# Patient Record
Sex: MALE | Race: OTHER | NOT HISPANIC OR LATINO
[De-identification: names, ages, dates, MRNs, and addresses within clinical notes are randomized per-mention and may not be internally consistent; named-entity substitution may affect disease eponyms.]

---

## 2017-03-03 ENCOUNTER — APPOINTMENT (OUTPATIENT)
Dept: HEART AND VASCULAR | Facility: CLINIC | Age: 72
End: 2017-03-03

## 2017-03-03 VITALS
HEIGHT: 60 IN | BODY MASS INDEX: 25.97 KG/M2 | SYSTOLIC BLOOD PRESSURE: 151 MMHG | HEART RATE: 94 BPM | DIASTOLIC BLOOD PRESSURE: 65 MMHG | WEIGHT: 132.28 LBS | OXYGEN SATURATION: 99 %

## 2017-06-06 ENCOUNTER — RECORD ABSTRACTING (OUTPATIENT)
Age: 72
End: 2017-06-06

## 2017-06-06 DIAGNOSIS — F15.90 OTHER STIMULANT USE, UNSPECIFIED, UNCOMPLICATED: ICD-10-CM

## 2017-06-06 DIAGNOSIS — R29.898 OTHER SYMPTOMS AND SIGNS INVOLVING THE MUSCULOSKELETAL SYSTEM: ICD-10-CM

## 2017-06-06 DIAGNOSIS — R94.31 ABNORMAL ELECTROCARDIOGRAM [ECG] [EKG]: ICD-10-CM

## 2017-06-06 DIAGNOSIS — Z83.3 FAMILY HISTORY OF DIABETES MELLITUS: ICD-10-CM

## 2017-06-06 DIAGNOSIS — R05 COUGH: ICD-10-CM

## 2017-06-06 DIAGNOSIS — Z87.01 PERSONAL HISTORY OF PNEUMONIA (RECURRENT): ICD-10-CM

## 2017-06-06 DIAGNOSIS — K08.9 DISORDER OF TEETH AND SUPPORTING STRUCTURES, UNSPECIFIED: ICD-10-CM

## 2017-06-06 DIAGNOSIS — Z82.3 FAMILY HISTORY OF STROKE: ICD-10-CM

## 2017-06-06 DIAGNOSIS — R07.89 OTHER CHEST PAIN: ICD-10-CM

## 2017-06-15 ENCOUNTER — EMERGENCY (EMERGENCY)
Facility: HOSPITAL | Age: 72
LOS: 1 days | Discharge: HOME | End: 2017-06-15
Attending: EMERGENCY MEDICINE | Admitting: EMERGENCY MEDICINE
Payer: COMMERCIAL

## 2017-06-15 VITALS
WEIGHT: 134.26 LBS | DIASTOLIC BLOOD PRESSURE: 80 MMHG | SYSTOLIC BLOOD PRESSURE: 173 MMHG | HEART RATE: 89 BPM | RESPIRATION RATE: 18 BRPM | TEMPERATURE: 97 F | HEIGHT: 65 IN | OXYGEN SATURATION: 99 %

## 2017-06-15 DIAGNOSIS — M54.2 CERVICALGIA: ICD-10-CM

## 2017-06-15 DIAGNOSIS — E78.5 HYPERLIPIDEMIA, UNSPECIFIED: ICD-10-CM

## 2017-06-15 DIAGNOSIS — E11.9 TYPE 2 DIABETES MELLITUS WITHOUT COMPLICATIONS: ICD-10-CM

## 2017-06-15 DIAGNOSIS — Z79.899 OTHER LONG TERM (CURRENT) DRUG THERAPY: ICD-10-CM

## 2017-06-15 DIAGNOSIS — I25.10 ATHEROSCLEROTIC HEART DISEASE OF NATIVE CORONARY ARTERY WITHOUT ANGINA PECTORIS: ICD-10-CM

## 2017-06-15 DIAGNOSIS — I10 ESSENTIAL (PRIMARY) HYPERTENSION: ICD-10-CM

## 2017-06-15 DIAGNOSIS — Z95.1 PRESENCE OF AORTOCORONARY BYPASS GRAFT: Chronic | ICD-10-CM

## 2017-06-15 DIAGNOSIS — Z79.82 LONG TERM (CURRENT) USE OF ASPIRIN: ICD-10-CM

## 2017-06-15 LAB
ALBUMIN SERPL ELPH-MCNC: 4.3 G/DL — SIGNIFICANT CHANGE UP (ref 3.3–5)
ALP SERPL-CCNC: 64 U/L — SIGNIFICANT CHANGE UP (ref 40–120)
ALT FLD-CCNC: 22 U/L — SIGNIFICANT CHANGE UP (ref 10–45)
ANION GAP SERPL CALC-SCNC: 12 MMOL/L — SIGNIFICANT CHANGE UP (ref 5–17)
APTT BLD: 33.4 SEC — SIGNIFICANT CHANGE UP (ref 27.5–37.4)
AST SERPL-CCNC: 23 U/L — SIGNIFICANT CHANGE UP (ref 10–40)
BASOPHILS NFR BLD AUTO: 0.8 % — SIGNIFICANT CHANGE UP (ref 0–2)
BILIRUB SERPL-MCNC: 0.6 MG/DL — SIGNIFICANT CHANGE UP (ref 0.2–1.2)
BUN SERPL-MCNC: 14 MG/DL — SIGNIFICANT CHANGE UP (ref 7–23)
CALCIUM SERPL-MCNC: 9.8 MG/DL — SIGNIFICANT CHANGE UP (ref 8.4–10.5)
CHLORIDE SERPL-SCNC: 103 MMOL/L — SIGNIFICANT CHANGE UP (ref 96–108)
CK MB CFR SERPL CALC: 3.3 NG/ML — SIGNIFICANT CHANGE UP (ref 0–6.7)
CK SERPL-CCNC: 88 U/L — SIGNIFICANT CHANGE UP (ref 30–200)
CO2 SERPL-SCNC: 24 MMOL/L — SIGNIFICANT CHANGE UP (ref 22–31)
CREAT SERPL-MCNC: 0.7 MG/DL — SIGNIFICANT CHANGE UP (ref 0.5–1.3)
EOSINOPHIL NFR BLD AUTO: 5.1 % — SIGNIFICANT CHANGE UP (ref 0–6)
GLUCOSE SERPL-MCNC: 142 MG/DL — HIGH (ref 70–99)
HCT VFR BLD CALC: 37.6 % — LOW (ref 39–50)
HGB BLD-MCNC: 12.8 G/DL — LOW (ref 13–17)
INR BLD: 1 — SIGNIFICANT CHANGE UP (ref 0.88–1.16)
LYMPHOCYTES # BLD AUTO: 28.9 % — SIGNIFICANT CHANGE UP (ref 13–44)
MCHC RBC-ENTMCNC: 28.1 PG — SIGNIFICANT CHANGE UP (ref 27–34)
MCHC RBC-ENTMCNC: 34 G/DL — SIGNIFICANT CHANGE UP (ref 32–36)
MCV RBC AUTO: 82.6 FL — SIGNIFICANT CHANGE UP (ref 80–100)
MONOCYTES NFR BLD AUTO: 10.3 % — SIGNIFICANT CHANGE UP (ref 2–14)
NEUTROPHILS NFR BLD AUTO: 54.9 % — SIGNIFICANT CHANGE UP (ref 43–77)
PLATELET # BLD AUTO: 255 K/UL — SIGNIFICANT CHANGE UP (ref 150–400)
POTASSIUM SERPL-MCNC: 5.1 MMOL/L — SIGNIFICANT CHANGE UP (ref 3.5–5.3)
POTASSIUM SERPL-SCNC: 5.1 MMOL/L — SIGNIFICANT CHANGE UP (ref 3.5–5.3)
PROT SERPL-MCNC: 7.9 G/DL — SIGNIFICANT CHANGE UP (ref 6–8.3)
PROTHROM AB SERPL-ACNC: 11.1 SEC — SIGNIFICANT CHANGE UP (ref 9.8–12.7)
RBC # BLD: 4.55 M/UL — SIGNIFICANT CHANGE UP (ref 4.2–5.8)
RBC # FLD: 14.7 % — SIGNIFICANT CHANGE UP (ref 10.3–16.9)
SODIUM SERPL-SCNC: 139 MMOL/L — SIGNIFICANT CHANGE UP (ref 135–145)
TROPONIN T SERPL-MCNC: <0.01 NG/ML — SIGNIFICANT CHANGE UP (ref 0–0.01)
WBC # BLD: 9.4 K/UL — SIGNIFICANT CHANGE UP (ref 3.8–10.5)
WBC # FLD AUTO: 9.4 K/UL — SIGNIFICANT CHANGE UP (ref 3.8–10.5)

## 2017-06-15 PROCEDURE — 85025 COMPLETE CBC W/AUTO DIFF WBC: CPT

## 2017-06-15 PROCEDURE — 93005 ELECTROCARDIOGRAM TRACING: CPT

## 2017-06-15 PROCEDURE — 71045 X-RAY EXAM CHEST 1 VIEW: CPT

## 2017-06-15 PROCEDURE — 99285 EMERGENCY DEPT VISIT HI MDM: CPT | Mod: 25

## 2017-06-15 PROCEDURE — 99283 EMERGENCY DEPT VISIT LOW MDM: CPT | Mod: 25

## 2017-06-15 PROCEDURE — 71010: CPT | Mod: 26

## 2017-06-15 PROCEDURE — 85610 PROTHROMBIN TIME: CPT

## 2017-06-15 PROCEDURE — 85730 THROMBOPLASTIN TIME PARTIAL: CPT

## 2017-06-15 PROCEDURE — 84484 ASSAY OF TROPONIN QUANT: CPT

## 2017-06-15 PROCEDURE — 93010 ELECTROCARDIOGRAM REPORT: CPT

## 2017-06-15 PROCEDURE — 82553 CREATINE MB FRACTION: CPT

## 2017-06-15 PROCEDURE — 82550 ASSAY OF CK (CPK): CPT

## 2017-06-15 PROCEDURE — 80053 COMPREHEN METABOLIC PANEL: CPT

## 2017-06-15 RX ORDER — CYCLOBENZAPRINE HYDROCHLORIDE 10 MG/1
10 TABLET, FILM COATED ORAL ONCE
Qty: 0 | Refills: 0 | Status: COMPLETED | OUTPATIENT
Start: 2017-06-15 | End: 2017-06-15

## 2017-06-15 RX ORDER — CYCLOBENZAPRINE HYDROCHLORIDE 10 MG/1
1 TABLET, FILM COATED ORAL
Qty: 15 | Refills: 0 | OUTPATIENT
Start: 2017-06-15

## 2017-06-15 RX ADMIN — CYCLOBENZAPRINE HYDROCHLORIDE 10 MILLIGRAM(S): 10 TABLET, FILM COATED ORAL at 15:16

## 2017-06-15 NOTE — ED ADULT NURSE NOTE - PSH
History of coronary artery bypass graft x 2    Other postprocedural status  S/P ear surgery  Status post cataract extraction  bilateral

## 2017-06-15 NOTE — ED ADULT NURSE NOTE - CHPI ED SYMPTOMS NEG
no abrasion/no tingling/no difficulty bearing weight/no numbness/no deformity/no fever/no bruising/no stiffness/no weakness/no back pain

## 2017-06-15 NOTE — ED ADULT NURSE NOTE - PMH
CAD (coronary artery disease)    Essential hypertension  HTN (hypertension)  H pylori ulcer    Type 2 diabetes mellitus  Diabetes

## 2017-06-15 NOTE — ED ADULT TRIAGE NOTE - CHIEF COMPLAINT QUOTE
Patient c/o neck pain since yesterday , denies injury , headache nor chest pain . Was sent by PMD for evaluation of dorsal chest pain  .  History of cardiac stent 2015 .

## 2017-06-15 NOTE — ED ADULT NURSE NOTE - OBJECTIVE STATEMENT
aox3 verbal, well appearing, ambulatory comes to ED c/o left sided neck pain x3 days, non radiating, no other neuro deficits noted.

## 2017-06-15 NOTE — ED PROVIDER NOTE - OBJECTIVE STATEMENT
70 y/o M w/ PMHx HTN, DM, HLD, CAD s/p prior PCI, s/p mid CABG LIMA-LAD 11/2015, Iron Deficiency Anemia w/ Upper GI Bleed 6 months ago and H.Pylori, h/o Hyperkalemia, 72 y/o M w/ PMHx HTN, DM, HLD, CAD, s/p mid CABG LIMA-LAD 11/2015, Iron Deficiency Anemia w/ Upper GI Bleed in the past (due to ulcer) who p/w left sided neck pain x 2 days. Patient works int [employer] and noticed pain 2 nights ago when he returned from worked, achey, worse with movement and palpation. Yesterday pain persisted all day, somewhat relieved with heat and Tylenol. Pt saw his PMD today who sent him to ER to r/o cardiac pathology. Pt denies anterior CP, SOB, leg swelling, dizziness, syncope, or palpitations. no n/t/w in extremities.. Pt has been complaint with his meds.

## 2017-06-15 NOTE — ED PROVIDER NOTE - PHYSICAL EXAMINATION
GEN: Well appearing, well nourished, awake, alert, oriented to person, place, time/situation and in no apparent distress.  ENT: Airway patent, Nasal mucosa clear. Mouth with normal mucosa.  EYES: Clear bilaterally.  RESPIRATORY: Breathing comfortably with normal RR.  CARDIAC: Regular rate and rhythm. No carotid bruit.  ABDOMEN: Soft, nontender, +bowel sounds, no rebound, rigidity, or guarding.  MSK: Range of motion is not limited, no deformities noted. +left paracervical muscle TTP, no midline c/t/l-spine TTP.  NEURO: Alert and oriented, no focal deficits. gait normal. Strength and sensation intact b/l.  SKIN: Skin normal color for race, warm, dry and intact. No evidence of rash.  PSYCH: Alert and oriented to person, place, time/situation. normal mood and affect. no apparent risk to self or others.

## 2017-06-15 NOTE — ED PROVIDER NOTE - MEDICAL DECISION MAKING DETAILS
72M with above PMHx with L-sided neck pain that started at work, denies trauma or injury, no CP, no neuro deficits, CXR and labs including CE unremarkable. hx/exam c/w MSK strain, like from work/poor posture/cervical strain. Pt cannot take NSAIDs due to bleeding risk. WIll give flexeril and advised continued tylenol, heating pad and f/u spine. Return precautions given.

## 2017-06-15 NOTE — ED PROVIDER NOTE - NS ED ROS FT
Constitutional: No fever or chills.   Eyes: No pain, blurry vision, or discharge.  ENMT: No hearing changes, pain, discharge or infections. No neck pain or stiffness.  Cardiac: No chest pain, SOB or edema.    Respiratory: No cough or respiratory distress. No hemoptysis.    GI: No nausea, vomiting, diarrhea or abdominal pain.  : No dysuria, frequency or burning.  MS: see hpi.  Neuro: No headache or weakness. No LOC.  Skin: No skin rash.   Except as documented in the HPI, all other systems are negative.

## 2017-06-28 ENCOUNTER — APPOINTMENT (OUTPATIENT)
Dept: HEART AND VASCULAR | Facility: CLINIC | Age: 72
End: 2017-06-28

## 2017-06-28 VITALS
DIASTOLIC BLOOD PRESSURE: 73 MMHG | HEART RATE: 87 BPM | OXYGEN SATURATION: 100 % | SYSTOLIC BLOOD PRESSURE: 138 MMHG | HEIGHT: 60 IN

## 2017-06-28 RX ORDER — CLOPIDOGREL 75 MG/1
75 TABLET, FILM COATED ORAL DAILY
Refills: 0 | Status: DISCONTINUED | COMMUNITY
End: 2017-06-28

## 2017-07-20 ENCOUNTER — APPOINTMENT (OUTPATIENT)
Dept: HEART AND VASCULAR | Facility: CLINIC | Age: 72
End: 2017-07-20

## 2017-07-20 VITALS
BODY MASS INDEX: 26.19 KG/M2 | WEIGHT: 133.38 LBS | DIASTOLIC BLOOD PRESSURE: 60 MMHG | SYSTOLIC BLOOD PRESSURE: 130 MMHG | TEMPERATURE: 97.8 F | OXYGEN SATURATION: 97 % | HEIGHT: 60 IN | HEART RATE: 97 BPM

## 2017-08-01 ENCOUNTER — APPOINTMENT (OUTPATIENT)
Dept: VASCULAR SURGERY | Facility: CLINIC | Age: 72
End: 2017-08-01
Payer: COMMERCIAL

## 2017-08-01 PROCEDURE — 93923 UPR/LXTR ART STDY 3+ LVLS: CPT

## 2017-08-01 PROCEDURE — 93925 LOWER EXTREMITY STUDY: CPT

## 2017-08-21 ENCOUNTER — RX RENEWAL (OUTPATIENT)
Age: 72
End: 2017-08-21

## 2017-12-15 ENCOUNTER — RX RENEWAL (OUTPATIENT)
Age: 72
End: 2017-12-15

## 2017-12-20 ENCOUNTER — RX RENEWAL (OUTPATIENT)
Age: 72
End: 2017-12-20

## 2018-01-10 ENCOUNTER — APPOINTMENT (OUTPATIENT)
Dept: HEART AND VASCULAR | Facility: CLINIC | Age: 73
End: 2018-01-10
Payer: COMMERCIAL

## 2018-01-10 VITALS
WEIGHT: 134 LBS | OXYGEN SATURATION: 98 % | HEART RATE: 90 BPM | SYSTOLIC BLOOD PRESSURE: 144 MMHG | BODY MASS INDEX: 26.17 KG/M2 | DIASTOLIC BLOOD PRESSURE: 71 MMHG

## 2018-01-10 PROCEDURE — 99213 OFFICE O/P EST LOW 20 MIN: CPT

## 2018-01-10 PROCEDURE — 93000 ELECTROCARDIOGRAM COMPLETE: CPT

## 2018-01-18 ENCOUNTER — APPOINTMENT (OUTPATIENT)
Dept: VASCULAR SURGERY | Facility: CLINIC | Age: 73
End: 2018-01-18
Payer: COMMERCIAL

## 2018-01-18 PROCEDURE — 93931 UPPER EXTREMITY STUDY: CPT

## 2018-01-26 ENCOUNTER — APPOINTMENT (OUTPATIENT)
Dept: HEART AND VASCULAR | Facility: CLINIC | Age: 73
End: 2018-01-26
Payer: COMMERCIAL

## 2018-01-26 VITALS
HEIGHT: 60 IN | OXYGEN SATURATION: 98 % | SYSTOLIC BLOOD PRESSURE: 143 MMHG | BODY MASS INDEX: 25.91 KG/M2 | TEMPERATURE: 97.8 F | DIASTOLIC BLOOD PRESSURE: 74 MMHG | WEIGHT: 131.99 LBS | HEART RATE: 85 BPM

## 2018-01-26 PROCEDURE — 99214 OFFICE O/P EST MOD 30 MIN: CPT

## 2018-01-26 RX ORDER — ACETAMINOPHEN/DIPHENHYDRAMINE 500MG-25MG
81 TABLET ORAL
Qty: 30 | Refills: 0 | Status: DISCONTINUED | COMMUNITY
Start: 2016-12-06 | End: 2018-01-26

## 2018-02-20 ENCOUNTER — APPOINTMENT (OUTPATIENT)
Dept: HEART AND VASCULAR | Facility: CLINIC | Age: 73
End: 2018-02-20

## 2018-03-20 ENCOUNTER — APPOINTMENT (OUTPATIENT)
Dept: HEART AND VASCULAR | Facility: CLINIC | Age: 73
End: 2018-03-20
Payer: COMMERCIAL

## 2018-03-20 VITALS — WEIGHT: 131.99 LBS | BODY MASS INDEX: 25.91 KG/M2 | HEIGHT: 60 IN

## 2018-03-20 PROCEDURE — 93015 CV STRESS TEST SUPVJ I&R: CPT

## 2018-03-20 PROCEDURE — 93306 TTE W/DOPPLER COMPLETE: CPT

## 2018-03-20 PROCEDURE — 78452 HT MUSCLE IMAGE SPECT MULT: CPT

## 2018-03-20 PROCEDURE — A9500: CPT

## 2018-04-27 ENCOUNTER — APPOINTMENT (OUTPATIENT)
Dept: HEART AND VASCULAR | Facility: CLINIC | Age: 73
End: 2018-04-27
Payer: COMMERCIAL

## 2018-04-27 VITALS
BODY MASS INDEX: 25.21 KG/M2 | OXYGEN SATURATION: 98 % | HEIGHT: 60 IN | WEIGHT: 128.42 LBS | HEART RATE: 71 BPM | SYSTOLIC BLOOD PRESSURE: 120 MMHG | DIASTOLIC BLOOD PRESSURE: 82 MMHG | TEMPERATURE: 97.5 F

## 2018-04-27 PROCEDURE — 99214 OFFICE O/P EST MOD 30 MIN: CPT

## 2018-04-27 RX ORDER — MEFLOQUINE HYDROCHLORIDE 250 MG/1
250 TABLET ORAL
Qty: 8 | Refills: 0 | Status: DISCONTINUED | COMMUNITY
Start: 2018-01-26 | End: 2018-04-27

## 2018-04-27 RX ORDER — DIPHENOXYLATE HYDROCHLORIDE AND ATROPINE SULFATE 2.5; .025 MG/1; MG/1
2.5-0.025 TABLET ORAL
Qty: 30 | Refills: 0 | Status: DISCONTINUED | COMMUNITY
Start: 2018-01-26 | End: 2018-04-27

## 2018-04-27 RX ORDER — AZITHROMYCIN 500 MG/1
500 TABLET, FILM COATED ORAL
Qty: 5 | Refills: 0 | Status: DISCONTINUED | COMMUNITY
Start: 2018-01-26 | End: 2018-04-27

## 2018-05-01 ENCOUNTER — FORM ENCOUNTER (OUTPATIENT)
Age: 73
End: 2018-05-01

## 2018-05-02 ENCOUNTER — OUTPATIENT (OUTPATIENT)
Dept: OUTPATIENT SERVICES | Facility: HOSPITAL | Age: 73
LOS: 1 days | End: 2018-05-02
Payer: COMMERCIAL

## 2018-05-02 ENCOUNTER — APPOINTMENT (OUTPATIENT)
Dept: ULTRASOUND IMAGING | Facility: HOSPITAL | Age: 73
End: 2018-05-02
Payer: COMMERCIAL

## 2018-05-02 DIAGNOSIS — Z95.1 PRESENCE OF AORTOCORONARY BYPASS GRAFT: Chronic | ICD-10-CM

## 2018-05-02 PROCEDURE — 93925 LOWER EXTREMITY STUDY: CPT

## 2018-05-02 PROCEDURE — 93925 LOWER EXTREMITY STUDY: CPT | Mod: 26

## 2018-05-04 ENCOUNTER — APPOINTMENT (OUTPATIENT)
Dept: HEART AND VASCULAR | Facility: CLINIC | Age: 73
End: 2018-05-04
Payer: COMMERCIAL

## 2018-05-04 VITALS
HEIGHT: 60 IN | OXYGEN SATURATION: 98 % | SYSTOLIC BLOOD PRESSURE: 130 MMHG | WEIGHT: 130 LBS | DIASTOLIC BLOOD PRESSURE: 50 MMHG | HEART RATE: 80 BPM | BODY MASS INDEX: 25.52 KG/M2 | TEMPERATURE: 97.8 F

## 2018-05-04 PROCEDURE — 99214 OFFICE O/P EST MOD 30 MIN: CPT

## 2018-05-04 RX ORDER — VALSARTAN 80 MG/1
80 TABLET, COATED ORAL
Qty: 90 | Refills: 0 | Status: DISCONTINUED | COMMUNITY
Start: 2017-04-27 | End: 2018-05-04

## 2018-05-11 ENCOUNTER — APPOINTMENT (OUTPATIENT)
Dept: VASCULAR SURGERY | Facility: CLINIC | Age: 73
End: 2018-05-11
Payer: COMMERCIAL

## 2018-05-11 PROCEDURE — 93923 UPR/LXTR ART STDY 3+ LVLS: CPT

## 2018-07-20 ENCOUNTER — APPOINTMENT (OUTPATIENT)
Dept: HEART AND VASCULAR | Facility: CLINIC | Age: 73
End: 2018-07-20

## 2018-08-08 ENCOUNTER — RX RENEWAL (OUTPATIENT)
Age: 73
End: 2018-08-08

## 2018-08-21 ENCOUNTER — APPOINTMENT (OUTPATIENT)
Dept: ENDOCRINOLOGY | Facility: CLINIC | Age: 73
End: 2018-08-21
Payer: COMMERCIAL

## 2018-08-21 VITALS
SYSTOLIC BLOOD PRESSURE: 156 MMHG | BODY MASS INDEX: 25.91 KG/M2 | HEART RATE: 99 BPM | WEIGHT: 132 LBS | HEIGHT: 60 IN | DIASTOLIC BLOOD PRESSURE: 70 MMHG

## 2018-08-21 PROCEDURE — 99204 OFFICE O/P NEW MOD 45 MIN: CPT

## 2018-08-21 RX ORDER — METFORMIN HYDROCHLORIDE 500 MG/1
500 TABLET, COATED ORAL TWICE DAILY
Qty: 360 | Refills: 3 | Status: DISCONTINUED | COMMUNITY
End: 2018-08-21

## 2018-08-21 RX ORDER — VALSARTAN 160 MG/1
160 TABLET, COATED ORAL
Qty: 90 | Refills: 3 | Status: DISCONTINUED | COMMUNITY
Start: 2017-04-27 | End: 2018-08-21

## 2018-09-05 ENCOUNTER — APPOINTMENT (OUTPATIENT)
Dept: HEART AND VASCULAR | Facility: CLINIC | Age: 73
End: 2018-09-05
Payer: COMMERCIAL

## 2018-09-05 VITALS — OXYGEN SATURATION: 98 % | HEART RATE: 75 BPM | SYSTOLIC BLOOD PRESSURE: 138 MMHG | DIASTOLIC BLOOD PRESSURE: 65 MMHG

## 2018-09-05 PROCEDURE — 99214 OFFICE O/P EST MOD 30 MIN: CPT

## 2018-10-01 ENCOUNTER — FORM ENCOUNTER (OUTPATIENT)
Age: 73
End: 2018-10-01

## 2018-10-02 ENCOUNTER — OUTPATIENT (OUTPATIENT)
Dept: OUTPATIENT SERVICES | Facility: HOSPITAL | Age: 73
LOS: 1 days | End: 2018-10-02
Payer: COMMERCIAL

## 2018-10-02 ENCOUNTER — APPOINTMENT (OUTPATIENT)
Dept: CT IMAGING | Facility: HOSPITAL | Age: 73
End: 2018-10-02
Payer: COMMERCIAL

## 2018-10-02 DIAGNOSIS — Z95.1 PRESENCE OF AORTOCORONARY BYPASS GRAFT: Chronic | ICD-10-CM

## 2018-10-02 PROCEDURE — 75574 CT ANGIO HRT W/3D IMAGE: CPT

## 2018-10-02 PROCEDURE — 75574 CT ANGIO HRT W/3D IMAGE: CPT | Mod: 26

## 2018-10-09 ENCOUNTER — APPOINTMENT (OUTPATIENT)
Dept: HEART AND VASCULAR | Facility: CLINIC | Age: 73
End: 2018-10-09
Payer: COMMERCIAL

## 2018-10-09 VITALS — HEART RATE: 81 BPM | SYSTOLIC BLOOD PRESSURE: 147 MMHG | DIASTOLIC BLOOD PRESSURE: 69 MMHG

## 2018-10-09 PROCEDURE — 99214 OFFICE O/P EST MOD 30 MIN: CPT

## 2018-10-11 LAB
ALBUMIN SERPL ELPH-MCNC: 4.5 G/DL
ALP BLD-CCNC: 64 U/L
ALT SERPL-CCNC: 27 U/L
ANION GAP SERPL CALC-SCNC: 12 MMOL/L
AST SERPL-CCNC: 28 U/L
BASOPHILS # BLD AUTO: 0.07 K/UL
BASOPHILS NFR BLD AUTO: 0.6 %
BILIRUB SERPL-MCNC: 0.9 MG/DL
BUN SERPL-MCNC: 10 MG/DL
CALCIUM SERPL-MCNC: 10.3 MG/DL
CHLORIDE SERPL-SCNC: 108 MMOL/L
CHOLEST SERPL-MCNC: 82 MG/DL
CHOLEST/HDLC SERPL: 2.2 RATIO
CO2 SERPL-SCNC: 23 MMOL/L
CREAT SERPL-MCNC: 0.83 MG/DL
EOSINOPHIL # BLD AUTO: 0.58 K/UL
EOSINOPHIL NFR BLD AUTO: 5.2 %
GLUCOSE SERPL-MCNC: 106 MG/DL
HBA1C MFR BLD HPLC: 7.2 %
HCT VFR BLD CALC: 39.6 %
HDLC SERPL-MCNC: 37 MG/DL
HGB BLD-MCNC: 12.8 G/DL
IMM GRANULOCYTES NFR BLD AUTO: 0.2 %
INR PPP: 1.01 RATIO
LDLC SERPL CALC-MCNC: 33 MG/DL
LYMPHOCYTES # BLD AUTO: 3.35 K/UL
LYMPHOCYTES NFR BLD AUTO: 30.2 %
MAN DIFF?: NORMAL
MCHC RBC-ENTMCNC: 28.3 PG
MCHC RBC-ENTMCNC: 32.3 GM/DL
MCV RBC AUTO: 87.4 FL
MONOCYTES # BLD AUTO: 0.97 K/UL
MONOCYTES NFR BLD AUTO: 8.7 %
NEUTROPHILS # BLD AUTO: 6.1 K/UL
NEUTROPHILS NFR BLD AUTO: 55.1 %
PLATELET # BLD AUTO: 309 K/UL
POTASSIUM SERPL-SCNC: 5.3 MMOL/L
PROT SERPL-MCNC: 7.6 G/DL
PT BLD: 11.4 SEC
RBC # BLD: 4.53 M/UL
RBC # FLD: 14.6 %
SODIUM SERPL-SCNC: 143 MMOL/L
TRIGL SERPL-MCNC: 58 MG/DL
WBC # FLD AUTO: 11.09 K/UL

## 2018-11-02 VITALS
DIASTOLIC BLOOD PRESSURE: 86 MMHG | SYSTOLIC BLOOD PRESSURE: 168 MMHG | RESPIRATION RATE: 16 BRPM | OXYGEN SATURATION: 100 % | HEART RATE: 78 BPM | TEMPERATURE: 99 F

## 2018-11-02 RX ORDER — CHLORHEXIDINE GLUCONATE 213 G/1000ML
1 SOLUTION TOPICAL ONCE
Qty: 0 | Refills: 0 | Status: DISCONTINUED | OUTPATIENT
Start: 2018-11-05 | End: 2018-11-05

## 2018-11-02 NOTE — H&P ADULT - ASSESSMENT
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     - Patient took home ASA 81mg this AM.  Will load with ASA 243mg PO x1 and Plavix 600mg POx1  - patient with mild leukocytosis of 10.8, w/o left shift and afebrile.  No sx of infection.     Risks & benefits of procedure and alternative therapy have been explained to the patient including but not limited to: allergic reaction, bleeding w/possible need for blood transfusion, infection, renal and vascular compromise, limb damage, arrhythmia, stroke, vessel dissection/perforation, Myocardial infarction, emergent CABG. Informed consent obtained and in chart.

## 2018-11-02 NOTE — H&P ADULT - HISTORY OF PRESENT ILLNESS
SKELETON     73 year-old male PMHx DM and known CAD s/p CABG LIMA to LAD, stents to RCA, OM1, subsequent occluded LIMA stent to LAD 2016 who presented to his cardiologist for routine follow up. Pt denied exertional chest pain but did note some SOB with walking __________.  Exercise nuclear stress test 3/20/2018 showed 2-3 mm ST depression in II,III, V4-V6 without myocardial perfusion deficits noted. There was abnormal and global hypokinesis. EF 65% CCTA 10/2/2018: graft of LAD appears occluded, LIMA to D1 appears patent. ISR cannot be excluded in pLAD. pLCx stent appears patent, dRCA stent not well visualized, moderate to severe stenosis mLCx, dense calcific plaque mLAD, p to mid RCA.     Pt denies     In light of patient’s risk factors, CCS angina class ____ symptoms, known CAD, abnormal CTA coronaries, pt is recommended to [facility] for cardiac catheterization with possible intervention if medically indicated.     Cardiac cath @ [facility] 12/5/2016: WILFRIDO pLAD, PTCA pLCx; LM luminal irregularities, pLAD 80% ISR, pLCX 80% ISR , mRCA 50%, dRCA stent patent, LIMA-LAD occluded, LIMA-D1 patent. 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 Pt  admits to some SOB with walking significant exertion such as walking several flights of stairs or walking briskly.  He denies any CP, palpitations, dizziness, syncope, LE swelling, orthopnea, PND, fevers, chills.  He underwent an Exercise nuclear stress test 3/20/2018 revealing 2-3 mm ST depression in II,III, V4-V6 without myocardial perfusion deficits noted. There was abnormal and global hypokinesis. EF 65%.  He subsequently underwent a CTA Cardiac 10/2/2018: graft of LAD appears occluded, LIMA to D1 appears patent. ISR cannot be excluded in pLAD. pLCx stent appears patent, dRCA stent not well visualized, moderate to severe stenosis mLCx, dense calcific plaque mLAD, p to mid RCA.     In light of patient’s risk factors, CCS anginal equivalent  class II symptoms, known CAD, abnormal CTA coronaries, pt is recommended to [facility] for cardiac catheterization with possible intervention if medically indicated.     Cardiac cath @ [facility] 12/5/2016: WILFRIDO pLAD, PTCA pLCx; LM luminal irregularities, pLAD 80% ISR, pLCX 80% ISR , mRCA 50%, dRCA stent patent, LIMA-LAD occluded, LIMA-D1 patent.

## 2018-11-05 ENCOUNTER — OUTPATIENT (OUTPATIENT)
Dept: OUTPATIENT SERVICES | Facility: HOSPITAL | Age: 73
LOS: 1 days | Discharge: NURSING, REHAB OR LONG-TERM CARE | End: 2018-11-05
Payer: COMMERCIAL

## 2018-11-05 DIAGNOSIS — Z95.1 PRESENCE OF AORTOCORONARY BYPASS GRAFT: Chronic | ICD-10-CM

## 2018-11-05 LAB
ANION GAP SERPL CALC-SCNC: 13 MMOL/L — SIGNIFICANT CHANGE UP (ref 5–17)
APTT BLD: 32.6 SEC — SIGNIFICANT CHANGE UP (ref 27.5–36.3)
BASOPHILS NFR BLD AUTO: 0.5 % — SIGNIFICANT CHANGE UP (ref 0–2)
BUN SERPL-MCNC: 12 MG/DL — SIGNIFICANT CHANGE UP (ref 7–23)
CALCIUM SERPL-MCNC: 9.7 MG/DL — SIGNIFICANT CHANGE UP (ref 8.4–10.5)
CHLORIDE SERPL-SCNC: 99 MMOL/L — SIGNIFICANT CHANGE UP (ref 96–108)
CHOLEST SERPL-MCNC: 76 MG/DL — SIGNIFICANT CHANGE UP (ref 10–199)
CK MB CFR SERPL CALC: 2.7 NG/ML — SIGNIFICANT CHANGE UP (ref 0–6.7)
CO2 SERPL-SCNC: 24 MMOL/L — SIGNIFICANT CHANGE UP (ref 22–31)
CREAT SERPL-MCNC: 0.84 MG/DL — SIGNIFICANT CHANGE UP (ref 0.5–1.3)
CRP SERPL-MCNC: <0.03 MG/DL — SIGNIFICANT CHANGE UP (ref 0–0.4)
EOSINOPHIL NFR BLD AUTO: 5.5 % — SIGNIFICANT CHANGE UP (ref 0–6)
GLUCOSE BLDC GLUCOMTR-MCNC: 119 MG/DL — HIGH (ref 70–99)
GLUCOSE BLDC GLUCOMTR-MCNC: 136 MG/DL — HIGH (ref 70–99)
GLUCOSE SERPL-MCNC: 147 MG/DL — HIGH (ref 70–99)
HBA1C BLD-MCNC: 6.9 % — HIGH (ref 4–5.6)
HCT VFR BLD CALC: 36.7 % — LOW (ref 39–50)
HDLC SERPL-MCNC: 35 MG/DL — LOW
HGB BLD-MCNC: 12.8 G/DL — LOW (ref 13–17)
INR BLD: 1.08 — SIGNIFICANT CHANGE UP (ref 0.88–1.16)
LIPID PNL WITH DIRECT LDL SERPL: 29 MG/DL — SIGNIFICANT CHANGE UP
LYMPHOCYTES # BLD AUTO: 29.9 % — SIGNIFICANT CHANGE UP (ref 13–44)
MCHC RBC-ENTMCNC: 29.2 PG — SIGNIFICANT CHANGE UP (ref 27–34)
MCHC RBC-ENTMCNC: 34.9 G/DL — SIGNIFICANT CHANGE UP (ref 32–36)
MCV RBC AUTO: 83.8 FL — SIGNIFICANT CHANGE UP (ref 80–100)
MONOCYTES NFR BLD AUTO: 7.5 % — SIGNIFICANT CHANGE UP (ref 2–14)
NEUTROPHILS NFR BLD AUTO: 56.6 % — SIGNIFICANT CHANGE UP (ref 43–77)
PLATELET # BLD AUTO: 263 K/UL — SIGNIFICANT CHANGE UP (ref 150–400)
POTASSIUM SERPL-MCNC: 4.6 MMOL/L — SIGNIFICANT CHANGE UP (ref 3.5–5.3)
POTASSIUM SERPL-SCNC: 4.6 MMOL/L — SIGNIFICANT CHANGE UP (ref 3.5–5.3)
PROTHROM AB SERPL-ACNC: 12.2 SEC — SIGNIFICANT CHANGE UP (ref 10–12.9)
RBC # BLD: 4.38 M/UL — SIGNIFICANT CHANGE UP (ref 4.2–5.8)
RBC # FLD: 14.1 % — SIGNIFICANT CHANGE UP (ref 10.3–16.9)
SODIUM SERPL-SCNC: 136 MMOL/L — SIGNIFICANT CHANGE UP (ref 135–145)
TOTAL CHOLESTEROL/HDL RATIO MEASUREMENT: 2.2 RATIO — LOW (ref 3.4–9.6)
TRIGL SERPL-MCNC: 59 MG/DL — SIGNIFICANT CHANGE UP (ref 10–149)
WBC # BLD: 10.8 K/UL — HIGH (ref 3.8–10.5)
WBC # FLD AUTO: 10.8 K/UL — HIGH (ref 3.8–10.5)

## 2018-11-05 PROCEDURE — 80048 BASIC METABOLIC PNL TOTAL CA: CPT

## 2018-11-05 PROCEDURE — 93571 IV DOP VEL&/PRESS C FLO 1ST: CPT | Mod: LC

## 2018-11-05 PROCEDURE — 82962 GLUCOSE BLOOD TEST: CPT

## 2018-11-05 PROCEDURE — 93459 L HRT ART/GRFT ANGIO: CPT | Mod: 26

## 2018-11-05 PROCEDURE — 93572 IV DOP VEL&/PRESS C FLO EA: CPT | Mod: RC

## 2018-11-05 PROCEDURE — 85730 THROMBOPLASTIN TIME PARTIAL: CPT

## 2018-11-05 PROCEDURE — 80061 LIPID PANEL: CPT

## 2018-11-05 PROCEDURE — 85025 COMPLETE CBC W/AUTO DIFF WBC: CPT

## 2018-11-05 PROCEDURE — C1769: CPT

## 2018-11-05 PROCEDURE — 93455 CORONARY ART/GRFT ANGIO S&I: CPT

## 2018-11-05 PROCEDURE — 93571 IV DOP VEL&/PRESS C FLO 1ST: CPT | Mod: 26

## 2018-11-05 PROCEDURE — 93010 ELECTROCARDIOGRAM REPORT: CPT

## 2018-11-05 PROCEDURE — C1887: CPT

## 2018-11-05 PROCEDURE — 82550 ASSAY OF CK (CPK): CPT

## 2018-11-05 PROCEDURE — C1894: CPT

## 2018-11-05 PROCEDURE — 82553 CREATINE MB FRACTION: CPT

## 2018-11-05 PROCEDURE — 93005 ELECTROCARDIOGRAM TRACING: CPT

## 2018-11-05 PROCEDURE — 93572 IV DOP VEL&/PRESS C FLO EA: CPT | Mod: 26

## 2018-11-05 PROCEDURE — 86140 C-REACTIVE PROTEIN: CPT

## 2018-11-05 PROCEDURE — 83036 HEMOGLOBIN GLYCOSYLATED A1C: CPT

## 2018-11-05 PROCEDURE — C1889: CPT

## 2018-11-05 PROCEDURE — 85610 PROTHROMBIN TIME: CPT

## 2018-11-05 RX ORDER — SODIUM CHLORIDE 9 MG/ML
1000 INJECTION, SOLUTION INTRAVENOUS
Qty: 0 | Refills: 0 | Status: DISCONTINUED | OUTPATIENT
Start: 2018-11-05 | End: 2018-11-05

## 2018-11-05 RX ORDER — DEXTROSE 50 % IN WATER 50 %
25 SYRINGE (ML) INTRAVENOUS ONCE
Qty: 0 | Refills: 0 | Status: DISCONTINUED | OUTPATIENT
Start: 2018-11-05 | End: 2018-11-05

## 2018-11-05 RX ORDER — SODIUM CHLORIDE 9 MG/ML
500 INJECTION INTRAMUSCULAR; INTRAVENOUS; SUBCUTANEOUS
Qty: 0 | Refills: 0 | Status: DISCONTINUED | OUTPATIENT
Start: 2018-11-05 | End: 2018-11-05

## 2018-11-05 RX ORDER — INSULIN LISPRO 100/ML
VIAL (ML) SUBCUTANEOUS
Qty: 0 | Refills: 0 | Status: DISCONTINUED | OUTPATIENT
Start: 2018-11-05 | End: 2018-11-05

## 2018-11-05 RX ORDER — DEXTROSE 50 % IN WATER 50 %
12.5 SYRINGE (ML) INTRAVENOUS ONCE
Qty: 0 | Refills: 0 | Status: DISCONTINUED | OUTPATIENT
Start: 2018-11-05 | End: 2018-11-05

## 2018-11-05 RX ORDER — ASPIRIN/CALCIUM CARB/MAGNESIUM 324 MG
243 TABLET ORAL ONCE
Qty: 0 | Refills: 0 | Status: COMPLETED | OUTPATIENT
Start: 2018-11-05 | End: 2018-11-05

## 2018-11-05 RX ORDER — CLOPIDOGREL BISULFATE 75 MG/1
600 TABLET, FILM COATED ORAL ONCE
Qty: 0 | Refills: 0 | Status: COMPLETED | OUTPATIENT
Start: 2018-11-05 | End: 2018-11-05

## 2018-11-05 RX ORDER — DEXTROSE 50 % IN WATER 50 %
15 SYRINGE (ML) INTRAVENOUS ONCE
Qty: 0 | Refills: 0 | Status: DISCONTINUED | OUTPATIENT
Start: 2018-11-05 | End: 2018-11-05

## 2018-11-05 RX ORDER — GLUCAGON INJECTION, SOLUTION 0.5 MG/.1ML
1 INJECTION, SOLUTION SUBCUTANEOUS ONCE
Qty: 0 | Refills: 0 | Status: DISCONTINUED | OUTPATIENT
Start: 2018-11-05 | End: 2018-11-05

## 2018-11-05 RX ADMIN — Medication 243 MILLIGRAM(S): at 11:45

## 2018-11-05 RX ADMIN — SODIUM CHLORIDE 75 MILLILITER(S): 9 INJECTION INTRAMUSCULAR; INTRAVENOUS; SUBCUTANEOUS at 11:45

## 2018-11-05 RX ADMIN — CLOPIDOGREL BISULFATE 600 MILLIGRAM(S): 75 TABLET, FILM COATED ORAL at 11:41

## 2018-11-05 NOTE — PROGRESS NOTE ADULT - SUBJECTIVE AND OBJECTIVE BOX
Interventional Cardiology PA SDA Discharge Note    Patient without complaints. Ambulated and voided without difficulties    Afebrile, VSS    Ext:   Left  radial : no hematoma,  no bleeding, dressing; C/D/I      Pulses:    intact RAD 2+     A/P:                  1.	Stable for discharge as per attending Dr. ANNE MARIE Mcdonough after bed rest, pt voids, wrist stable and 30 minutes of ambulation.  2.	Follow-up with PMD/Cardiologist  Dr Johnson  in 1-2 weeks  3.	Discharged forms signed and copies in chart Interventional Cardiology PA SDA Discharge Note    Patient without complaints. 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     s/p cardiac cath 11/5/18: non obstructive CAD with  pLcx FFR 0.87 and mRCA FFR 0.81, prior stents in dRCA and pLAD are patent, patent LIMA-LAD  L radial access (snuffbox)      1.	Stable for discharge as per attending Dr. ANNE MARIE Mcdonough after bed rest, pt voids, wrist stable and 30 minutes of ambulation.  2.	Follow-up with PMD/Cardiologist  Dr Johnson  in 1-2 weeks  3.	Discharged forms signed and copies in chart

## 2018-11-06 DIAGNOSIS — I10 ESSENTIAL (PRIMARY) HYPERTENSION: ICD-10-CM

## 2018-11-06 DIAGNOSIS — I25.82 CHRONIC TOTAL OCCLUSION OF CORONARY ARTERY: ICD-10-CM

## 2018-11-06 DIAGNOSIS — R94.39 ABNORMAL RESULT OF OTHER CARDIOVASCULAR FUNCTION STUDY: ICD-10-CM

## 2018-11-06 DIAGNOSIS — Z79.82 LONG TERM (CURRENT) USE OF ASPIRIN: ICD-10-CM

## 2018-11-06 DIAGNOSIS — E11.9 TYPE 2 DIABETES MELLITUS WITHOUT COMPLICATIONS: ICD-10-CM

## 2018-11-06 DIAGNOSIS — E78.5 HYPERLIPIDEMIA, UNSPECIFIED: ICD-10-CM

## 2018-11-06 DIAGNOSIS — Z95.1 PRESENCE OF AORTOCORONARY BYPASS GRAFT: ICD-10-CM

## 2018-11-06 DIAGNOSIS — I25.118 ATHEROSCLEROTIC HEART DISEASE OF NATIVE CORONARY ARTERY WITH OTHER FORMS OF ANGINA PECTORIS: ICD-10-CM

## 2018-11-06 DIAGNOSIS — Z79.84 LONG TERM (CURRENT) USE OF ORAL HYPOGLYCEMIC DRUGS: ICD-10-CM

## 2018-11-15 ENCOUNTER — APPOINTMENT (OUTPATIENT)
Dept: HEART AND VASCULAR | Facility: CLINIC | Age: 73
End: 2018-11-15
Payer: COMMERCIAL

## 2018-11-15 VITALS
SYSTOLIC BLOOD PRESSURE: 154 MMHG | DIASTOLIC BLOOD PRESSURE: 76 MMHG | TEMPERATURE: 97.9 F | HEIGHT: 60 IN | HEART RATE: 88 BPM | BODY MASS INDEX: 25.52 KG/M2 | WEIGHT: 129.98 LBS | OXYGEN SATURATION: 98 %

## 2018-11-15 PROCEDURE — 99214 OFFICE O/P EST MOD 30 MIN: CPT

## 2018-11-15 NOTE — HISTORY OF PRESENT ILLNESS
[FreeTextEntry1] : 72 m LIMA to LAD, stent to LCX ef drop on post stress subsequent stent to lad htn, dm states is more fatigue with exertion than he used to be. no chest pain. no other associated symptoms. still with claudication never went for LE arterial duplex. last nuclear stress test 2018 normal perfusion. has claudication worse with walking bilateral calf muscles not worse with higher dose statin

## 2018-11-15 NOTE — PHYSICAL EXAM
[General Appearance - Well Developed] : well developed [Normal Appearance] : normal appearance [Well Groomed] : well groomed [General Appearance - Well Nourished] : well nourished [No Deformities] : no deformities [General Appearance - In No Acute Distress] : no acute distress [Normal Conjunctiva] : the conjunctiva exhibited no abnormalities [Eyelids - No Xanthelasma] : the eyelids demonstrated no xanthelasmas [Normal Oral Mucosa] : normal oral mucosa [No Oral Pallor] : no oral pallor [No Oral Cyanosis] : no oral cyanosis [Normal Jugular Venous A Waves Present] : normal jugular venous A waves present [Normal Jugular Venous V Waves Present] : normal jugular venous V waves present [No Jugular Venous Wise A Waves] : no jugular venous wise A waves [Respiration, Rhythm And Depth] : normal respiratory rhythm and effort [Exaggerated Use Of Accessory Muscles For Inspiration] : no accessory muscle use [Auscultation Breath Sounds / Voice Sounds] : lungs were clear to auscultation bilaterally [Heart Rate And Rhythm] : heart rate and rhythm were normal [Heart Sounds] : normal S1 and S2 [Murmurs] : no murmurs present [Abdomen Soft] : soft [Abdomen Tenderness] : non-tender [Abdomen Mass (___ Cm)] : no abdominal mass palpated [Abnormal Walk] : normal gait [Gait - Sufficient For Exercise Testing] : the gait was sufficient for exercise testing [Nail Clubbing] : no clubbing of the fingernails [Cyanosis, Localized] : no localized cyanosis [Petechial Hemorrhages (___cm)] : no petechial hemorrhages [Skin Color & Pigmentation] : normal skin color and pigmentation [] : no rash [No Venous Stasis] : no venous stasis [Skin Lesions] : no skin lesions [No Skin Ulcers] : no skin ulcer [No Xanthoma] : no  xanthoma was observed [Oriented To Time, Place, And Person] : oriented to person, place, and time [Affect] : the affect was normal [Mood] : the mood was normal [No Anxiety] : not feeling anxious

## 2018-11-15 NOTE — ASSESSMENT
[FreeTextEntry1] : dm very poorly controlled refer to endo\par htn did not take meds\par cad on GDMT s/p recent intervention obtain cath reports\par fu in six months\par \par

## 2018-12-03 ENCOUNTER — APPOINTMENT (OUTPATIENT)
Dept: ENDOCRINOLOGY | Facility: CLINIC | Age: 73
End: 2018-12-03

## 2019-01-08 ENCOUNTER — RX RENEWAL (OUTPATIENT)
Age: 74
End: 2019-01-08

## 2019-03-26 ENCOUNTER — APPOINTMENT (OUTPATIENT)
Dept: HEART AND VASCULAR | Facility: CLINIC | Age: 74
End: 2019-03-26
Payer: COMMERCIAL

## 2019-03-26 VITALS
HEIGHT: 60 IN | BODY MASS INDEX: 25.13 KG/M2 | DIASTOLIC BLOOD PRESSURE: 68 MMHG | OXYGEN SATURATION: 99 % | WEIGHT: 128 LBS | SYSTOLIC BLOOD PRESSURE: 160 MMHG | TEMPERATURE: 97.5 F | HEART RATE: 73 BPM

## 2019-03-26 PROCEDURE — 36415 COLL VENOUS BLD VENIPUNCTURE: CPT

## 2019-03-26 PROCEDURE — 99214 OFFICE O/P EST MOD 30 MIN: CPT

## 2019-03-26 PROCEDURE — 93000 ELECTROCARDIOGRAM COMPLETE: CPT

## 2019-03-26 RX ORDER — VALSARTAN 160 MG/1
160 TABLET, COATED ORAL DAILY
Qty: 90 | Refills: 3 | Status: DISCONTINUED | COMMUNITY
End: 2019-03-26

## 2019-03-26 NOTE — ASSESSMENT
[FreeTextEntry1] : dm very poorly controlled refer to endo\par htn add amlodipine 5 mg daily \par cad on GDMT s/p recent intervention obtain cath reports\par chest pain stress test and echo \par fu in one month\par \par

## 2019-03-26 NOTE — HISTORY OF PRESENT ILLNESS
[FreeTextEntry1] : 72 m LIMA to LAD, stent to LCX ef drop on post stress subsequent stent to lad htn, dm states is more fatigue with exertion than he used to be. one episode of chest pain described as spasm lasted 30 min no further symptoms did not feel like his previous symptoms\par \par \par ecg sr rbbb

## 2019-03-27 LAB
ALBUMIN SERPL ELPH-MCNC: 4.1 G/DL
ALP BLD-CCNC: 62 U/L
ALT SERPL-CCNC: 22 U/L
ANION GAP SERPL CALC-SCNC: 10 MMOL/L
AST SERPL-CCNC: 22 U/L
BASOPHILS # BLD AUTO: 0.09 K/UL
BASOPHILS NFR BLD AUTO: 1 %
BILIRUB SERPL-MCNC: 0.7 MG/DL
BUN SERPL-MCNC: 13 MG/DL
CALCIUM SERPL-MCNC: 10.1 MG/DL
CHLORIDE SERPL-SCNC: 107 MMOL/L
CHOLEST SERPL-MCNC: 89 MG/DL
CHOLEST/HDLC SERPL: 2.7 RATIO
CO2 SERPL-SCNC: 21 MMOL/L
CREAT SERPL-MCNC: 0.77 MG/DL
EOSINOPHIL # BLD AUTO: 0.62 K/UL
EOSINOPHIL NFR BLD AUTO: 6.6 %
ESTIMATED AVERAGE GLUCOSE: 171 MG/DL
GLUCOSE SERPL-MCNC: 179 MG/DL
HBA1C MFR BLD HPLC: 7.6 %
HCT VFR BLD CALC: 37.6 %
HDLC SERPL-MCNC: 33 MG/DL
HGB BLD-MCNC: 12.3 G/DL
IMM GRANULOCYTES NFR BLD AUTO: 0.2 %
LDLC SERPL CALC-MCNC: 32 MG/DL
LYMPHOCYTES # BLD AUTO: 2.72 K/UL
LYMPHOCYTES NFR BLD AUTO: 28.9 %
MAN DIFF?: NORMAL
MCHC RBC-ENTMCNC: 28.7 PG
MCHC RBC-ENTMCNC: 32.7 GM/DL
MCV RBC AUTO: 87.6 FL
MONOCYTES # BLD AUTO: 0.76 K/UL
MONOCYTES NFR BLD AUTO: 8.1 %
NEUTROPHILS # BLD AUTO: 5.21 K/UL
NEUTROPHILS NFR BLD AUTO: 55.2 %
PLATELET # BLD AUTO: 281 K/UL
POTASSIUM SERPL-SCNC: 4.7 MMOL/L
PROT SERPL-MCNC: 7 G/DL
RBC # BLD: 4.29 M/UL
RBC # FLD: 14.3 %
SODIUM SERPL-SCNC: 138 MMOL/L
TRIGL SERPL-MCNC: 118 MG/DL
WBC # FLD AUTO: 9.42 K/UL

## 2019-04-18 ENCOUNTER — EMERGENCY (EMERGENCY)
Facility: HOSPITAL | Age: 74
LOS: 1 days | Discharge: HOME | End: 2019-04-18
Attending: EMERGENCY MEDICINE | Admitting: EMERGENCY MEDICINE
Payer: COMMERCIAL

## 2019-04-18 VITALS
OXYGEN SATURATION: 99 % | SYSTOLIC BLOOD PRESSURE: 166 MMHG | DIASTOLIC BLOOD PRESSURE: 82 MMHG | TEMPERATURE: 98 F | RESPIRATION RATE: 19 BRPM | HEART RATE: 81 BPM

## 2019-04-18 VITALS
SYSTOLIC BLOOD PRESSURE: 158 MMHG | DIASTOLIC BLOOD PRESSURE: 63 MMHG | WEIGHT: 136.69 LBS | HEIGHT: 65 IN | OXYGEN SATURATION: 98 % | HEART RATE: 82 BPM | TEMPERATURE: 98 F | RESPIRATION RATE: 18 BRPM

## 2019-04-18 DIAGNOSIS — Z79.84 LONG TERM (CURRENT) USE OF ORAL HYPOGLYCEMIC DRUGS: ICD-10-CM

## 2019-04-18 DIAGNOSIS — Z95.1 PRESENCE OF AORTOCORONARY BYPASS GRAFT: Chronic | ICD-10-CM

## 2019-04-18 DIAGNOSIS — R07.89 OTHER CHEST PAIN: ICD-10-CM

## 2019-04-18 DIAGNOSIS — I10 ESSENTIAL (PRIMARY) HYPERTENSION: ICD-10-CM

## 2019-04-18 DIAGNOSIS — Z79.82 LONG TERM (CURRENT) USE OF ASPIRIN: ICD-10-CM

## 2019-04-18 DIAGNOSIS — I25.10 ATHEROSCLEROTIC HEART DISEASE OF NATIVE CORONARY ARTERY WITHOUT ANGINA PECTORIS: ICD-10-CM

## 2019-04-18 DIAGNOSIS — E11.9 TYPE 2 DIABETES MELLITUS WITHOUT COMPLICATIONS: ICD-10-CM

## 2019-04-18 DIAGNOSIS — Z79.899 OTHER LONG TERM (CURRENT) DRUG THERAPY: ICD-10-CM

## 2019-04-18 LAB
ALBUMIN SERPL ELPH-MCNC: 3.9 G/DL — SIGNIFICANT CHANGE UP (ref 3.3–5)
ALP SERPL-CCNC: 57 U/L — SIGNIFICANT CHANGE UP (ref 40–120)
ALT FLD-CCNC: 20 U/L — SIGNIFICANT CHANGE UP (ref 10–45)
ANION GAP SERPL CALC-SCNC: 11 MMOL/L — SIGNIFICANT CHANGE UP (ref 5–17)
APTT BLD: 30.7 SEC — SIGNIFICANT CHANGE UP (ref 27.5–36.3)
AST SERPL-CCNC: 24 U/L — SIGNIFICANT CHANGE UP (ref 10–40)
BASOPHILS # BLD AUTO: 0.09 K/UL — SIGNIFICANT CHANGE UP (ref 0–0.2)
BASOPHILS NFR BLD AUTO: 1 % — SIGNIFICANT CHANGE UP (ref 0–2)
BILIRUB SERPL-MCNC: 0.7 MG/DL — SIGNIFICANT CHANGE UP (ref 0.2–1.2)
BUN SERPL-MCNC: 10 MG/DL — SIGNIFICANT CHANGE UP (ref 7–23)
CALCIUM SERPL-MCNC: 10.1 MG/DL — SIGNIFICANT CHANGE UP (ref 8.4–10.5)
CHLORIDE SERPL-SCNC: 103 MMOL/L — SIGNIFICANT CHANGE UP (ref 96–108)
CK MB CFR SERPL CALC: 4.2 NG/ML — SIGNIFICANT CHANGE UP (ref 0–6.7)
CK SERPL-CCNC: 124 U/L — SIGNIFICANT CHANGE UP (ref 30–200)
CO2 SERPL-SCNC: 20 MMOL/L — LOW (ref 22–31)
CREAT SERPL-MCNC: 0.75 MG/DL — SIGNIFICANT CHANGE UP (ref 0.5–1.3)
EOSINOPHIL # BLD AUTO: 0.53 K/UL — HIGH (ref 0–0.5)
EOSINOPHIL NFR BLD AUTO: 6.1 % — HIGH (ref 0–6)
GLUCOSE SERPL-MCNC: 203 MG/DL — HIGH (ref 70–99)
HCT VFR BLD CALC: 36 % — LOW (ref 39–50)
HGB BLD-MCNC: 11.8 G/DL — LOW (ref 13–17)
IMM GRANULOCYTES NFR BLD AUTO: 0.2 % — SIGNIFICANT CHANGE UP (ref 0–1.5)
INR BLD: 1.02 — SIGNIFICANT CHANGE UP (ref 0.88–1.16)
LYMPHOCYTES # BLD AUTO: 2.72 K/UL — SIGNIFICANT CHANGE UP (ref 1–3.3)
LYMPHOCYTES # BLD AUTO: 31.5 % — SIGNIFICANT CHANGE UP (ref 13–44)
MCHC RBC-ENTMCNC: 28.6 PG — SIGNIFICANT CHANGE UP (ref 27–34)
MCHC RBC-ENTMCNC: 32.8 GM/DL — SIGNIFICANT CHANGE UP (ref 32–36)
MCV RBC AUTO: 87.2 FL — SIGNIFICANT CHANGE UP (ref 80–100)
MONOCYTES # BLD AUTO: 0.79 K/UL — SIGNIFICANT CHANGE UP (ref 0–0.9)
MONOCYTES NFR BLD AUTO: 9.2 % — SIGNIFICANT CHANGE UP (ref 2–14)
NEUTROPHILS # BLD AUTO: 4.48 K/UL — SIGNIFICANT CHANGE UP (ref 1.8–7.4)
NEUTROPHILS NFR BLD AUTO: 52 % — SIGNIFICANT CHANGE UP (ref 43–77)
NRBC # BLD: 0 /100 WBCS — SIGNIFICANT CHANGE UP (ref 0–0)
PLATELET # BLD AUTO: 266 K/UL — SIGNIFICANT CHANGE UP (ref 150–400)
POTASSIUM SERPL-MCNC: 4.3 MMOL/L — SIGNIFICANT CHANGE UP (ref 3.5–5.3)
POTASSIUM SERPL-SCNC: 4.3 MMOL/L — SIGNIFICANT CHANGE UP (ref 3.5–5.3)
PROT SERPL-MCNC: 7.1 G/DL — SIGNIFICANT CHANGE UP (ref 6–8.3)
PROTHROM AB SERPL-ACNC: 11.5 SEC — SIGNIFICANT CHANGE UP (ref 10–12.9)
RBC # BLD: 4.13 M/UL — LOW (ref 4.2–5.8)
RBC # FLD: 14.1 % — SIGNIFICANT CHANGE UP (ref 10.3–14.5)
SODIUM SERPL-SCNC: 134 MMOL/L — LOW (ref 135–145)
TROPONIN T SERPL-MCNC: <0.01 NG/ML — SIGNIFICANT CHANGE UP (ref 0–0.01)
WBC # BLD: 8.63 K/UL — SIGNIFICANT CHANGE UP (ref 3.8–10.5)
WBC # FLD AUTO: 8.63 K/UL — SIGNIFICANT CHANGE UP (ref 3.8–10.5)

## 2019-04-18 PROCEDURE — 71045 X-RAY EXAM CHEST 1 VIEW: CPT | Mod: 26

## 2019-04-18 PROCEDURE — 99283 EMERGENCY DEPT VISIT LOW MDM: CPT | Mod: 25

## 2019-04-18 PROCEDURE — 99285 EMERGENCY DEPT VISIT HI MDM: CPT | Mod: 25

## 2019-04-18 PROCEDURE — 71045 X-RAY EXAM CHEST 1 VIEW: CPT

## 2019-04-18 PROCEDURE — 82553 CREATINE MB FRACTION: CPT

## 2019-04-18 PROCEDURE — 80053 COMPREHEN METABOLIC PANEL: CPT

## 2019-04-18 PROCEDURE — 85610 PROTHROMBIN TIME: CPT

## 2019-04-18 PROCEDURE — 85025 COMPLETE CBC W/AUTO DIFF WBC: CPT

## 2019-04-18 PROCEDURE — 85730 THROMBOPLASTIN TIME PARTIAL: CPT

## 2019-04-18 PROCEDURE — 36415 COLL VENOUS BLD VENIPUNCTURE: CPT

## 2019-04-18 PROCEDURE — 82550 ASSAY OF CK (CPK): CPT

## 2019-04-18 PROCEDURE — 84484 ASSAY OF TROPONIN QUANT: CPT

## 2019-04-18 NOTE — ED PROVIDER NOTE - PROGRESS NOTE DETAILS
CP free in ED, rpt trop negative, follows with Dr. Johnson as outpt, advised to maintain close fu, strict return prec given.

## 2019-04-18 NOTE — ED PROVIDER NOTE - CARE PROVIDER_API CALL
Franco Johnson)  Cardiology; Internal Medicine  [address] [place] [address]  Phone: [phone]  Fax: [phone]  Follow Up Time:

## 2019-04-18 NOTE — ED PROVIDER NOTE - OBJECTIVE STATEMENT
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 s/p cardiac cath 11/5/18: non obstructive CAD with  pLcx FFR 0.87 and mRCA FFR 0.81, prior stents in dRCA and pLAD are patent, patent LIMA-LAD. No leg swelling, shortness of breath, cough, f/c. states under stress due to daughter's MCAT.

## 2019-04-18 NOTE — ED PROVIDER NOTE - NSFOLLOWUPINSTRUCTIONS_ED_ALL_ED_FT
Nonspecific Chest Pain  ImageChest pain can be caused by many different conditions. There is always a chance that your pain could be related to something serious, such as a heart attack or a blood clot in your lungs. Chest pain can also be caused by conditions that are not life-threatening. If you have chest pain, it is very important to follow up with your health care provider.    What are the causes?  Causes of this condition include:    Heartburn.  Pneumonia or bronchitis.  Anxiety or stress.  Inflammation around your heart (pericarditis) or lung (pleuritis or pleurisy).  A blood clot in your lung.  A collapsed lung (pneumothorax). This can develop suddenly on its own (spontaneous pneumothorax) or from trauma to the chest.  Shingles infection (varicella-zoster virus).  Heart attack.  Damage to the bones, muscles, and cartilage that make up your chest wall. This can include:    Bruised bones due to injury.  Strained muscles or cartilage due to frequent or repeated coughing or overwork.  Fracture to one or more ribs.  Sore cartilage due to inflammation (costochondritis).      What increases the risk?  Risk factors for this condition may include:    Activities that increase your risk for trauma or injury to your chest.  Respiratory infections or conditions that cause frequent coughing.  Medical conditions or overeating that can cause heartburn.  Heart disease or family history of heart disease.  Conditions or health behaviors that increase your risk of developing a blood clot.  Having had chicken pox (varicella zoster).    What are the signs or symptoms?  Chest pain can feel like:    Burning or tingling on the surface of your chest or deep in your chest.  Crushing, pressure, aching, or squeezing pain.  Dull or sharp pain that is worse when you move, cough, or take a deep breath.  Pain that is also felt in your back, neck, shoulder, or arm, or pain that spreads to any of these areas.    Your chest pain may come and go, or it may stay constant.    How is this diagnosed?  Lab tests or other studies may be needed to find the cause of your pain. Your health care provider may have you take a test called an ECG (electrocardiogram). An ECG records your heartbeat patterns at the time the test is performed. You may also have other tests, such as:    Transthoracic echocardiogram (TTE). In this test, sound waves are used to create a picture of the heart structures and to look at how blood flows through your heart.  Transesophageal echocardiogram (DAYNA). This is a more advanced imaging test that takes images from inside your body. It allows your health care provider to see your heart in finer detail.  Cardiac monitoring. This allows your health care provider to monitor your heart rate and rhythm in real time.  Holter monitor. This is a portable device that records your heartbeat and can help to diagnose abnormal heartbeats. It allows your health care provider to track your heart activity for several days, if needed.  Stress tests. These can be done through exercise or by taking medicine that makes your heart beat more quickly.  Blood tests.  Other imaging tests.    How is this treated?  Treatment depends on what is causing your chest pain. Treatment may include:    Medicines. These may include:    Acid blockers for heartburn.  Anti-inflammatory medicine.  Pain medicine for inflammatory conditions.  Antibiotic medicine, if an infection is present.  Medicines to dissolve blood clots.  Medicines to treat coronary artery disease (CAD).    Supportive care for conditions that do not require medicines. This may include:    Resting.  Applying heat or cold packs to injured areas.  Limiting activities until pain decreases.      Follow these instructions at home:  Medicines     If you were prescribed an antibiotic, take it as told by your health care provider. Do not stop taking the antibiotic even if you start to feel better.  Take over-the-counter and prescription medicines only as told by your health care provider.  Lifestyle     Do not use any products that contain nicotine or tobacco, such as cigarettes and e-cigarettes. If you need help quitting, ask your health care provider.  Do not drink alcohol.  ImageMake lifestyle changes as directed by your health care provider. These may include:    Getting regular exercise. Ask your health care provider to suggest some activities that are safe for you.  Eating a heart-healthy diet. A registered dietitian can help you to learn healthy eating options.  Maintaining a healthy weight.  Managing diabetes, if necessary.  Reducing stress, such as with yoga or relaxation techniques.    General instructions     Avoid any activities that bring on chest pain.  If heartburn is the cause for your chest pain, raise (elevate) the head of your bed about 6 inches (15 cm) by putting blocks under the legs. Sleeping with more pillows does not effectively relieve heartburn because it only changes the position of your head.  Keep all follow-up visits as told by your health care provider. This is important. This includes any further testing if your chest pain does not go away.  Contact a health care provider if:  Your chest pain does not go away.  You have a rash with blisters on your chest.  You have a fever.  You have chills.  Get help right away if:  Your chest pain is worse.  You have a cough that gets worse, or you cough up blood.  You have severe pain in your abdomen.  You have severe weakness.  You faint.  You have sudden, unexplained chest discomfort.  You have sudden, unexplained discomfort in your arms, back, neck, or jaw.  You have shortness of breath at any time.  You suddenly start to sweat, or your skin gets clammy.  You feel nauseous or you vomit.  You suddenly feel light-headed or dizzy.  Your heart begins to beat quickly, or it feels like it is skipping beats.  These symptoms may represent a serious problem that is an emergency. Do not wait to see if the symptoms will go away. Get medical help right away. Call your local emergency services (911 in the [place] Do not drive yourself to the hospital.     This information is not intended to replace advice given to you by your health care provider. Make sure you discuss any questions you have with your health care provider.

## 2019-04-18 NOTE — ED PROVIDER NOTE - DIAGNOSTIC INTERPRETATION
ER Physician: Patience Cesar MD  CHEST XRAY INTERPRETATION: lungs clear, heart shadow normal, bony structures intact

## 2019-04-18 NOTE — ED ADULT TRIAGE NOTE - CHIEF COMPLAINT QUOTE
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 reports PMH of 3 cardiac stents and bypass. denies SOB, cp at present time. ekg done.

## 2019-04-18 NOTE — ED PROVIDER NOTE - CLINICAL SUMMARY MEDICAL DECISION MAKING FREE TEXT BOX
chest pain, now resolved, will obtain cardiac evaluation and reeval. chest pain, now resolved, will obtain cardiac evaluation and reeval. Recent cath in Nov reviewed.

## 2019-04-27 NOTE — ED ADULT TRIAGE NOTE - NSTRIAGECARE_GEN_A_ER
- CTA of Chest is negative for PE or other significant findings  - Supplemental O2 as needed  - See plan as per above     EKG

## 2019-05-29 ENCOUNTER — APPOINTMENT (OUTPATIENT)
Dept: HEART AND VASCULAR | Facility: CLINIC | Age: 74
End: 2019-05-29
Payer: COMMERCIAL

## 2019-05-29 VITALS
DIASTOLIC BLOOD PRESSURE: 68 MMHG | HEIGHT: 60 IN | HEART RATE: 93 BPM | WEIGHT: 131 LBS | SYSTOLIC BLOOD PRESSURE: 142 MMHG | OXYGEN SATURATION: 99 % | TEMPERATURE: 98.1 F | BODY MASS INDEX: 25.72 KG/M2

## 2019-05-29 PROCEDURE — 99214 OFFICE O/P EST MOD 30 MIN: CPT

## 2019-05-29 PROCEDURE — 36415 COLL VENOUS BLD VENIPUNCTURE: CPT

## 2019-05-29 NOTE — HISTORY OF PRESENT ILLNESS
[FreeTextEntry1] : 72 m LIMA to LAD,  stent to LCX and LAD HTN DM here for  dry cough every day for a month also with bilateral leg edema otherwise feels well has no cp no sob no other complaints \par \par \par ecg sr rbbb

## 2019-05-29 NOTE — ASSESSMENT
[FreeTextEntry1] : dm very poorly controlled refer to endo\par htn not sure why he stopped BB has had angina in the past resume \par cad on GDMT \par cough obtain chest xray \par fu in three months \par \par

## 2019-05-29 NOTE — PHYSICAL EXAM
[General Appearance - Well Developed] : well developed [Normal Appearance] : normal appearance [Well Groomed] : well groomed [General Appearance - Well Nourished] : well nourished [No Deformities] : no deformities [General Appearance - In No Acute Distress] : no acute distress [Normal Conjunctiva] : the conjunctiva exhibited no abnormalities [Eyelids - No Xanthelasma] : the eyelids demonstrated no xanthelasmas [Normal Oral Mucosa] : normal oral mucosa [No Oral Pallor] : no oral pallor [No Oral Cyanosis] : no oral cyanosis [Normal Jugular Venous A Waves Present] : normal jugular venous A waves present [Normal Jugular Venous V Waves Present] : normal jugular venous V waves present [No Jugular Venous Wise A Waves] : no jugular venous wise A waves [Respiration, Rhythm And Depth] : normal respiratory rhythm and effort [Exaggerated Use Of Accessory Muscles For Inspiration] : no accessory muscle use [Auscultation Breath Sounds / Voice Sounds] : lungs were clear to auscultation bilaterally [Heart Rate And Rhythm] : heart rate and rhythm were normal [Heart Sounds] : normal S1 and S2 [Murmurs] : no murmurs present [Abdomen Soft] : soft [Abdomen Tenderness] : non-tender [Abdomen Mass (___ Cm)] : no abdominal mass palpated [Abnormal Walk] : normal gait [Gait - Sufficient For Exercise Testing] : the gait was sufficient for exercise testing [Nail Clubbing] : no clubbing of the fingernails [Cyanosis, Localized] : no localized cyanosis [Petechial Hemorrhages (___cm)] : no petechial hemorrhages [Skin Color & Pigmentation] : normal skin color and pigmentation [] : no rash [No Venous Stasis] : no venous stasis [Skin Lesions] : no skin lesions [No Skin Ulcers] : no skin ulcer [No Xanthoma] : no  xanthoma was observed [Affect] : the affect was normal [Oriented To Time, Place, And Person] : oriented to person, place, and time [Mood] : the mood was normal [No Anxiety] : not feeling anxious [FreeTextEntry1] : right lung crackles

## 2019-05-30 LAB
ALBUMIN SERPL ELPH-MCNC: 4.4 G/DL
ALP BLD-CCNC: 58 U/L
ALT SERPL-CCNC: 25 U/L
ANION GAP SERPL CALC-SCNC: 12 MMOL/L
AST SERPL-CCNC: 21 U/L
BASOPHILS # BLD AUTO: 0.12 K/UL
BASOPHILS NFR BLD AUTO: 1.2 %
BILIRUB SERPL-MCNC: 1 MG/DL
BUN SERPL-MCNC: 12 MG/DL
CALCIUM SERPL-MCNC: 10.5 MG/DL
CHLORIDE SERPL-SCNC: 101 MMOL/L
CHOLEST SERPL-MCNC: 103 MG/DL
CHOLEST/HDLC SERPL: 2.7 RATIO
CO2 SERPL-SCNC: 22 MMOL/L
CREAT SERPL-MCNC: 0.69 MG/DL
EOSINOPHIL # BLD AUTO: 0.89 K/UL
EOSINOPHIL NFR BLD AUTO: 9.2 %
ESTIMATED AVERAGE GLUCOSE: 189 MG/DL
GLUCOSE SERPL-MCNC: 196 MG/DL
HBA1C MFR BLD HPLC: 8.2 %
HCT VFR BLD CALC: 38.4 %
HDLC SERPL-MCNC: 38 MG/DL
HGB BLD-MCNC: 12.6 G/DL
IMM GRANULOCYTES NFR BLD AUTO: 0.3 %
LDLC SERPL CALC-MCNC: 50 MG/DL
LYMPHOCYTES # BLD AUTO: 2.53 K/UL
LYMPHOCYTES NFR BLD AUTO: 26.1 %
MAN DIFF?: NORMAL
MCHC RBC-ENTMCNC: 29.1 PG
MCHC RBC-ENTMCNC: 32.8 GM/DL
MCV RBC AUTO: 88.7 FL
MONOCYTES # BLD AUTO: 0.7 K/UL
MONOCYTES NFR BLD AUTO: 7.2 %
NEUTROPHILS # BLD AUTO: 5.43 K/UL
NEUTROPHILS NFR BLD AUTO: 56 %
NT-PROBNP SERPL-MCNC: 28 PG/ML
PLATELET # BLD AUTO: 293 K/UL
POTASSIUM SERPL-SCNC: 4.9 MMOL/L
PROT SERPL-MCNC: 7.7 G/DL
RBC # BLD: 4.33 M/UL
RBC # FLD: 14.1 %
SODIUM SERPL-SCNC: 135 MMOL/L
TRIGL SERPL-MCNC: 77 MG/DL
WBC # FLD AUTO: 9.7 K/UL

## 2019-06-03 PROBLEM — K08.9 DENTAL DISORDER: Status: RESOLVED | Noted: 2017-06-06 | Resolved: 2019-06-03

## 2019-07-11 NOTE — ED ADULT NURSE NOTE - NS ED PATIENT SAFETY CONCERN
Urinary frequency R35.0 Urinalysis     Urine Culture   2. PTSD (post-traumatic stress disorder) F43.10 escitalopram (LEXAPRO) 10 MG tablet   3. Anxiety F41.9 escitalopram (LEXAPRO) 10 MG tablet   4. Chronic pain of left knee M25.562 Cindy Betancourt MD, Orthopaedic Surgery, [place]    G89.29    5. Mild persistent asthma without complication H01.32 Stable and controlled   6. Mixed hyperlipidemia E78.2 Lipid Panel   7. History of Chiari malformation Z86.69 stable   8. S/P  shunt Z98.2 stable   9. VSD (ventricular septal defect) Q21.0 No issues now   10. Fatigue, unspecified type R53.83 T4, Free     TSH without Reflex   11. Elevated BP without diagnosis of hypertension R03.0 CBC Auto Differential     Comprehensive Metabolic Panel     Microalbumin / Creatinine Urine Ratio   12. BMI 50.0-59.9, adult (HCC) Z68.43 Hemoglobin A1C       Orders Placed This Encounter   Procedures    Urine Culture    Hemoglobin A1C    CBC Auto Differential    Comprehensive Metabolic Panel    Lipid Panel    Microalbumin / Creatinine Urine Ratio    T4, Free    TSH without Reflex    Urinalysis   Michael Woo MD, Orthopaedic Surgery, [place]        No follow-ups on file. No

## 2019-08-20 ENCOUNTER — APPOINTMENT (OUTPATIENT)
Dept: HEART AND VASCULAR | Facility: CLINIC | Age: 74
End: 2019-08-20
Payer: COMMERCIAL

## 2019-08-20 VITALS
OXYGEN SATURATION: 98 % | BODY MASS INDEX: 26.7 KG/M2 | WEIGHT: 136 LBS | HEART RATE: 84 BPM | SYSTOLIC BLOOD PRESSURE: 140 MMHG | DIASTOLIC BLOOD PRESSURE: 58 MMHG | HEIGHT: 60 IN | TEMPERATURE: 98.2 F

## 2019-08-20 PROCEDURE — 99214 OFFICE O/P EST MOD 30 MIN: CPT

## 2019-08-20 PROCEDURE — 36415 COLL VENOUS BLD VENIPUNCTURE: CPT

## 2019-08-20 RX ORDER — ATORVASTATIN CALCIUM 80 MG/1
80 TABLET, FILM COATED ORAL DAILY
Qty: 90 | Refills: 3 | Status: DISCONTINUED | COMMUNITY
End: 2019-08-20

## 2019-08-20 NOTE — PHYSICAL EXAM
[General Appearance - Well Developed] : well developed [Normal Appearance] : normal appearance [Well Groomed] : well groomed [General Appearance - Well Nourished] : well nourished [No Deformities] : no deformities [General Appearance - In No Acute Distress] : no acute distress [Normal Conjunctiva] : the conjunctiva exhibited no abnormalities [Normal Oral Mucosa] : normal oral mucosa [Eyelids - No Xanthelasma] : the eyelids demonstrated no xanthelasmas [No Oral Pallor] : no oral pallor [No Oral Cyanosis] : no oral cyanosis [Normal Jugular Venous A Waves Present] : normal jugular venous A waves present [Normal Jugular Venous V Waves Present] : normal jugular venous V waves present [No Jugular Venous Wise A Waves] : no jugular venous wise A waves [Auscultation Breath Sounds / Voice Sounds] : lungs were clear to auscultation bilaterally [Exaggerated Use Of Accessory Muscles For Inspiration] : no accessory muscle use [Respiration, Rhythm And Depth] : normal respiratory rhythm and effort [Heart Rate And Rhythm] : heart rate and rhythm were normal [Heart Sounds] : normal S1 and S2 [Abdomen Soft] : soft [Murmurs] : no murmurs present [Abdomen Tenderness] : non-tender [Abdomen Mass (___ Cm)] : no abdominal mass palpated [Abnormal Walk] : normal gait [Gait - Sufficient For Exercise Testing] : the gait was sufficient for exercise testing [Nail Clubbing] : no clubbing of the fingernails [Petechial Hemorrhages (___cm)] : no petechial hemorrhages [Cyanosis, Localized] : no localized cyanosis [Skin Color & Pigmentation] : normal skin color and pigmentation [] : no rash [No Venous Stasis] : no venous stasis [Skin Lesions] : no skin lesions [No Skin Ulcers] : no skin ulcer [Affect] : the affect was normal [No Xanthoma] : no  xanthoma was observed [Oriented To Time, Place, And Person] : oriented to person, place, and time [No Anxiety] : not feeling anxious [Mood] : the mood was normal [FreeTextEntry1] : right lung crackles

## 2019-08-20 NOTE — HISTORY OF PRESENT ILLNESS
[FreeTextEntry1] : 74 m LIMA to LAD,  stent to LCX and LAD HTN DM here for follow up complaining of bilateral calf pain known calcified peripheral arteries \par \par \par ecg sr rbbb

## 2019-08-20 NOTE — ASSESSMENT
[FreeTextEntry1] : dm very poorly controlled refer to endo\par htn increase irbesartan to 300 mg daily \par cad on GDMT \par claudication will change to rosuvastatin lisa/pvr \par fu in three months \par \par \par

## 2019-09-06 ENCOUNTER — RX RENEWAL (OUTPATIENT)
Age: 74
End: 2019-09-06

## 2019-11-19 ENCOUNTER — APPOINTMENT (OUTPATIENT)
Dept: HEART AND VASCULAR | Facility: CLINIC | Age: 74
End: 2019-11-19

## 2019-11-19 DIAGNOSIS — I25.810 ATHEROSCLEROSIS OF CORONARY ARTERY BYPASS GRAFT(S) W/OUT ANGINA PECTORIS: ICD-10-CM

## 2019-11-26 ENCOUNTER — APPOINTMENT (OUTPATIENT)
Dept: HEART AND VASCULAR | Facility: CLINIC | Age: 74
End: 2019-11-26

## 2019-12-03 ENCOUNTER — APPOINTMENT (OUTPATIENT)
Dept: HEART AND VASCULAR | Facility: CLINIC | Age: 74
End: 2019-12-03
Payer: COMMERCIAL

## 2019-12-03 ENCOUNTER — NON-APPOINTMENT (OUTPATIENT)
Age: 74
End: 2019-12-03

## 2019-12-03 ENCOUNTER — APPOINTMENT (OUTPATIENT)
Dept: HEART AND VASCULAR | Facility: CLINIC | Age: 74
End: 2019-12-03

## 2019-12-03 VITALS — DIASTOLIC BLOOD PRESSURE: 72 MMHG | SYSTOLIC BLOOD PRESSURE: 135 MMHG

## 2019-12-03 VITALS
HEIGHT: 60 IN | HEART RATE: 92 BPM | WEIGHT: 138.89 LBS | OXYGEN SATURATION: 99 % | SYSTOLIC BLOOD PRESSURE: 141 MMHG | BODY MASS INDEX: 27.27 KG/M2 | DIASTOLIC BLOOD PRESSURE: 75 MMHG

## 2019-12-03 PROCEDURE — 93000 ELECTROCARDIOGRAM COMPLETE: CPT

## 2019-12-03 PROCEDURE — 99214 OFFICE O/P EST MOD 30 MIN: CPT | Mod: 25

## 2019-12-03 NOTE — DISCUSSION/SUMMARY
[FreeTextEntry1] : All relevant risks and benefits discussed and patient verbalizes understanding and agreement with plan.

## 2019-12-03 NOTE — REASON FOR VISIT
[FreeTextEntry1] : Diagnostic Test:\par ----------------------------------\par ECG:\par 12/3/2019: Sinus rhythm with RBBB\par 3/26/2019: Sinus rhythm with RBBB\par ----------------------------------\par Echo:\par 3/20/2018: EF 60-70%, normal LV systolic function, trace TR.\par 11/2015: EF 50-60%, normal LV systolic function, PASP 27mmHg\par -----------------------------------\par Stress:\par 3/20/2018: EF 66%, 7:31 minutes, + ECG changes, base and midanterior wall defect\par 11/2015: EF 25% severe global hypokenesis\par -----------------------------------\par CTCA:\par 10/2018: LAD grafts occluded, LIMA-D1 patent. pLAD ISR cannot be excluded. pLCx patent\par                 stent, moderate to severe stenosis mLCx, calcified plaque to mLAD, prox to mid RCA\par -----------------------------------\par Cath:\par 11/5/2018: LM mild disease, patent LIMA—LAD/D1, pLAD stent patent, proxLCx \par 	30% ISR, dRCA patent stent, mRCA 50% stenosis, LIMA—D1 patent\par 2016: LAD severe ISR, LCx 80% stenosis, mRCA 50%, SVG-RCA patent, LIMA-LAD/D1 jump graft.\par            LIMA-LAD occluded. LIMA to D1 patent, WILFRIDO to pLAD, PTCA to pLCx\par \par -----------------------------------\par Lower extremities arteries:\par 5/11/2018: RLE no significant drop, LLE mild drop in post exercise SYL\par 5/2/2018: Extensive arterial calcification,  no significant stenosis of the common and SFA. \par                < 50% stenosis right PA, diminished flow without complete occlusion.\par

## 2019-12-03 NOTE — ASSESSMENT
[FreeTextEntry1] : CAD: s/p MIDCAB X2 and multiple stents, CCS 0\par - Continue aggressive medical management\par - Continue ASA 81mg daily\par - Continue Toprol 50mg daily\par - Continue Crestor 40mg daily\par \par HTN: BP at ACC/AHA 2017 guideline target\par - Continue Amlodipine 5mg daily\par - Continue Toprol 50mg daily\par - Continue Irbesartan 300mg daily\par - Discussed importance of heart healthy diet (low Na, low fat)\par \par Dyslipidemia: LDL50, HDL 38 at goal\par -Discussed therapeutic lifestyle changes to promote improved lipid metabolism\par - Continue Crestor 40mg daily\par \par Diabetes Type II: Latest A1c: 8.2%\par - Continue glipizide 2.5 mg twice daily \par - Continue metformin 1000mg twice daily\par - Discussed therapeutic lifestyle changes to improve glucose metabolism \par - Follow up with endocrinology, Dr. Bill\par \par Left wrist pain: (-) swelling, redness and 2+ radial pulse\par - Follow up with PMD if it persist\par \par

## 2019-12-03 NOTE — HISTORY OF PRESENT ILLNESS
[FreeTextEntry1] : Sherry Wright presents for a follow up and management of  DM, HTN, CAD s/p multiple PCI (RCA 2014, OM 2015, LAD, 2016 and LCx, 2017) and CABG/MIDCAB X2 (LIMA-LAD/D1, SVG-RCA). Since his last visit he has been well overall. He admits to not taking Toprol for 1 week because he ran out. Denies CP, SOB, GAMBINO, dizziness, palpitations, LE edema or LE claudication. 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

## 2019-12-03 NOTE — PHYSICAL EXAM
[Rhythm Regular] : regular [2+] : left 2+ [S3] : no S3 [S4] : no S4 [Left Carotid Bruit] : no bruit heard over the left carotid [Right Carotid Bruit] : no bruit heard over the right carotid [Right Femoral Bruit] : no bruit heard over the right femoral artery [Left Femoral Bruit] : no bruit heard over the left femoral artery

## 2020-02-18 ENCOUNTER — APPOINTMENT (OUTPATIENT)
Dept: HEART AND VASCULAR | Facility: CLINIC | Age: 75
End: 2020-02-18
Payer: COMMERCIAL

## 2020-02-18 VITALS
HEART RATE: 91 BPM | HEIGHT: 60 IN | OXYGEN SATURATION: 98 % | SYSTOLIC BLOOD PRESSURE: 130 MMHG | TEMPERATURE: 97.7 F | WEIGHT: 128 LBS | BODY MASS INDEX: 25.13 KG/M2 | DIASTOLIC BLOOD PRESSURE: 52 MMHG

## 2020-02-18 DIAGNOSIS — E78.5 HYPERLIPIDEMIA, UNSPECIFIED: ICD-10-CM

## 2020-02-18 PROCEDURE — 36415 COLL VENOUS BLD VENIPUNCTURE: CPT

## 2020-02-18 PROCEDURE — 99214 OFFICE O/P EST MOD 30 MIN: CPT

## 2020-02-18 RX ORDER — LANCETS
EACH MISCELLANEOUS
Qty: 100 | Refills: 0 | Status: DISCONTINUED | COMMUNITY
Start: 2018-01-26 | End: 2020-02-18

## 2020-02-18 NOTE — PHYSICAL EXAM
[General Appearance - Well Developed] : well developed [Normal Appearance] : normal appearance [Well Groomed] : well groomed [General Appearance - Well Nourished] : well nourished [No Deformities] : no deformities [General Appearance - In No Acute Distress] : no acute distress [Normal Conjunctiva] : the conjunctiva exhibited no abnormalities [Eyelids - No Xanthelasma] : the eyelids demonstrated no xanthelasmas [Normal Oral Mucosa] : normal oral mucosa [No Oral Pallor] : no oral pallor [No Oral Cyanosis] : no oral cyanosis [Normal Jugular Venous A Waves Present] : normal jugular venous A waves present [Normal Jugular Venous V Waves Present] : normal jugular venous V waves present [No Jugular Venous Wise A Waves] : no jugular venous wise A waves [Respiration, Rhythm And Depth] : normal respiratory rhythm and effort [Exaggerated Use Of Accessory Muscles For Inspiration] : no accessory muscle use [Auscultation Breath Sounds / Voice Sounds] : lungs were clear to auscultation bilaterally [Heart Rate And Rhythm] : heart rate and rhythm were normal [Heart Sounds] : normal S1 and S2 [Murmurs] : no murmurs present [Abdomen Soft] : soft [Abdomen Tenderness] : non-tender [Abdomen Mass (___ Cm)] : no abdominal mass palpated [Abnormal Walk] : normal gait [Gait - Sufficient For Exercise Testing] : the gait was sufficient for exercise testing [Nail Clubbing] : no clubbing of the fingernails [Cyanosis, Localized] : no localized cyanosis [Petechial Hemorrhages (___cm)] : no petechial hemorrhages [Skin Color & Pigmentation] : normal skin color and pigmentation [] : no rash [No Venous Stasis] : no venous stasis [Skin Lesions] : no skin lesions [No Skin Ulcers] : no skin ulcer [No Xanthoma] : no  xanthoma was observed [Oriented To Time, Place, And Person] : oriented to person, place, and time [Affect] : the affect was normal [Mood] : the mood was normal [No Anxiety] : not feeling anxious [FreeTextEntry1] : right lung crackles

## 2020-02-18 NOTE — ASSESSMENT
[FreeTextEntry1] : dm very poorly controlled refer to endo\par htn controlled\par cad on GDMT \par claudication symptoms improved on meds\par fu in three months \par \par \par

## 2020-02-18 NOTE — HISTORY OF PRESENT ILLNESS
[FreeTextEntry1] : 74 m LIMA to LAD,  stent to LCX and LAD HTN DM PAD calcified arteries on SYL/PVR here for follow up no complaints \par \par \par ecg sr rbbb

## 2020-02-19 LAB
ALBUMIN SERPL ELPH-MCNC: 4.1 G/DL
ALP BLD-CCNC: 64 U/L
ALT SERPL-CCNC: 18 U/L
ANION GAP SERPL CALC-SCNC: 12 MMOL/L
AST SERPL-CCNC: 18 U/L
BASOPHILS # BLD AUTO: 0.1 K/UL
BASOPHILS NFR BLD AUTO: 1 %
BILIRUB SERPL-MCNC: 0.7 MG/DL
BUN SERPL-MCNC: 17 MG/DL
CALCIUM SERPL-MCNC: 10.2 MG/DL
CHLORIDE SERPL-SCNC: 103 MMOL/L
CHOLEST SERPL-MCNC: 87 MG/DL
CHOLEST/HDLC SERPL: 2.9 RATIO
CO2 SERPL-SCNC: 22 MMOL/L
CREAT SERPL-MCNC: 0.96 MG/DL
EOSINOPHIL # BLD AUTO: 0.82 K/UL
EOSINOPHIL NFR BLD AUTO: 8.2 %
ESTIMATED AVERAGE GLUCOSE: 174 MG/DL
GLUCOSE SERPL-MCNC: 123 MG/DL
HBA1C MFR BLD HPLC: 7.7 %
HCT VFR BLD CALC: 39.2 %
HDLC SERPL-MCNC: 30 MG/DL
HGB BLD-MCNC: 12.9 G/DL
IMM GRANULOCYTES NFR BLD AUTO: 0.4 %
LDLC SERPL CALC-MCNC: 30 MG/DL
LYMPHOCYTES # BLD AUTO: 2.65 K/UL
LYMPHOCYTES NFR BLD AUTO: 26.6 %
MAN DIFF?: NORMAL
MCHC RBC-ENTMCNC: 29 PG
MCHC RBC-ENTMCNC: 32.9 GM/DL
MCV RBC AUTO: 88.1 FL
MONOCYTES # BLD AUTO: 1 K/UL
MONOCYTES NFR BLD AUTO: 10 %
NEUTROPHILS # BLD AUTO: 5.35 K/UL
NEUTROPHILS NFR BLD AUTO: 53.8 %
PLATELET # BLD AUTO: 348 K/UL
POTASSIUM SERPL-SCNC: 5 MMOL/L
PROT SERPL-MCNC: 7.1 G/DL
RBC # BLD: 4.45 M/UL
RBC # FLD: 14.3 %
SODIUM SERPL-SCNC: 137 MMOL/L
TRIGL SERPL-MCNC: 132 MG/DL
WBC # FLD AUTO: 9.96 K/UL

## 2020-03-29 ENCOUNTER — TRANSCRIPTION ENCOUNTER (OUTPATIENT)
Age: 75
End: 2020-03-29

## 2020-04-16 ENCOUNTER — TRANSCRIPTION ENCOUNTER (OUTPATIENT)
Age: 75
End: 2020-04-16

## 2020-04-17 ENCOUNTER — TRANSCRIPTION ENCOUNTER (OUTPATIENT)
Age: 75
End: 2020-04-17

## 2020-04-25 ENCOUNTER — MESSAGE (OUTPATIENT)
Age: 75
End: 2020-04-25

## 2020-05-12 ENCOUNTER — TRANSCRIPTION ENCOUNTER (OUTPATIENT)
Age: 75
End: 2020-05-12

## 2020-05-27 ENCOUNTER — APPOINTMENT (OUTPATIENT)
Dept: HEART AND VASCULAR | Facility: CLINIC | Age: 75
End: 2020-05-27

## 2020-06-01 ENCOUNTER — RX RENEWAL (OUTPATIENT)
Age: 75
End: 2020-06-01

## 2020-07-02 ENCOUNTER — NON-APPOINTMENT (OUTPATIENT)
Age: 75
End: 2020-07-02

## 2020-07-02 ENCOUNTER — APPOINTMENT (OUTPATIENT)
Dept: HEART AND VASCULAR | Facility: CLINIC | Age: 75
End: 2020-07-02
Payer: COMMERCIAL

## 2020-07-02 ENCOUNTER — EMERGENCY (EMERGENCY)
Facility: HOSPITAL | Age: 75
LOS: 1 days | Discharge: HOME | End: 2020-07-02
Attending: EMERGENCY MEDICINE | Admitting: EMERGENCY MEDICINE
Payer: COMMERCIAL

## 2020-07-02 VITALS
DIASTOLIC BLOOD PRESSURE: 68 MMHG | HEIGHT: 68 IN | HEART RATE: 104 BPM | SYSTOLIC BLOOD PRESSURE: 156 MMHG | TEMPERATURE: 98 F | WEIGHT: 130.07 LBS | RESPIRATION RATE: 16 BRPM | OXYGEN SATURATION: 100 %

## 2020-07-02 VITALS
SYSTOLIC BLOOD PRESSURE: 139 MMHG | OXYGEN SATURATION: 96 % | HEART RATE: 90 BPM | RESPIRATION RATE: 18 BRPM | DIASTOLIC BLOOD PRESSURE: 79 MMHG

## 2020-07-02 VITALS
HEART RATE: 107 BPM | OXYGEN SATURATION: 96 % | BODY MASS INDEX: 25 KG/M2 | TEMPERATURE: 97.7 F | SYSTOLIC BLOOD PRESSURE: 140 MMHG | WEIGHT: 128 LBS | DIASTOLIC BLOOD PRESSURE: 60 MMHG

## 2020-07-02 DIAGNOSIS — R06.02 SHORTNESS OF BREATH: ICD-10-CM

## 2020-07-02 DIAGNOSIS — Z79.899 OTHER LONG TERM (CURRENT) DRUG THERAPY: ICD-10-CM

## 2020-07-02 DIAGNOSIS — E11.9 TYPE 2 DIABETES MELLITUS WITHOUT COMPLICATIONS: ICD-10-CM

## 2020-07-02 DIAGNOSIS — Z79.84 LONG TERM (CURRENT) USE OF ORAL HYPOGLYCEMIC DRUGS: ICD-10-CM

## 2020-07-02 DIAGNOSIS — Z95.1 PRESENCE OF AORTOCORONARY BYPASS GRAFT: Chronic | ICD-10-CM

## 2020-07-02 DIAGNOSIS — I10 ESSENTIAL (PRIMARY) HYPERTENSION: ICD-10-CM

## 2020-07-02 LAB
ALBUMIN SERPL ELPH-MCNC: 4.1 G/DL — SIGNIFICANT CHANGE UP (ref 3.3–5)
ALP SERPL-CCNC: 78 U/L — SIGNIFICANT CHANGE UP (ref 40–120)
ALT FLD-CCNC: 14 U/L — SIGNIFICANT CHANGE UP (ref 10–45)
ANION GAP SERPL CALC-SCNC: 12 MMOL/L — SIGNIFICANT CHANGE UP (ref 5–17)
APTT BLD: 33 SEC — SIGNIFICANT CHANGE UP (ref 27.5–35.5)
AST SERPL-CCNC: 22 U/L — SIGNIFICANT CHANGE UP (ref 10–40)
BASOPHILS # BLD AUTO: 0.13 K/UL — SIGNIFICANT CHANGE UP (ref 0–0.2)
BASOPHILS NFR BLD AUTO: 1 % — SIGNIFICANT CHANGE UP (ref 0–2)
BILIRUB SERPL-MCNC: 0.7 MG/DL — SIGNIFICANT CHANGE UP (ref 0.2–1.2)
BUN SERPL-MCNC: 16 MG/DL — SIGNIFICANT CHANGE UP (ref 7–23)
CALCIUM SERPL-MCNC: 9.8 MG/DL — SIGNIFICANT CHANGE UP (ref 8.4–10.5)
CHLORIDE SERPL-SCNC: 102 MMOL/L — SIGNIFICANT CHANGE UP (ref 96–108)
CO2 SERPL-SCNC: 21 MMOL/L — LOW (ref 22–31)
CREAT SERPL-MCNC: 0.76 MG/DL — SIGNIFICANT CHANGE UP (ref 0.5–1.3)
EOSINOPHIL # BLD AUTO: 1.01 K/UL — HIGH (ref 0–0.5)
EOSINOPHIL NFR BLD AUTO: 8.1 % — HIGH (ref 0–6)
GLUCOSE SERPL-MCNC: 221 MG/DL — HIGH (ref 70–99)
HCT VFR BLD CALC: 39.2 % — SIGNIFICANT CHANGE UP (ref 39–50)
HGB BLD-MCNC: 13.1 G/DL — SIGNIFICANT CHANGE UP (ref 13–17)
IMM GRANULOCYTES NFR BLD AUTO: 0.3 % — SIGNIFICANT CHANGE UP (ref 0–1.5)
INR BLD: 1.07 — SIGNIFICANT CHANGE UP (ref 0.88–1.16)
LYMPHOCYTES # BLD AUTO: 3.97 K/UL — HIGH (ref 1–3.3)
LYMPHOCYTES # BLD AUTO: 31.7 % — SIGNIFICANT CHANGE UP (ref 13–44)
MCHC RBC-ENTMCNC: 28.7 PG — SIGNIFICANT CHANGE UP (ref 27–34)
MCHC RBC-ENTMCNC: 33.4 GM/DL — SIGNIFICANT CHANGE UP (ref 32–36)
MCV RBC AUTO: 85.8 FL — SIGNIFICANT CHANGE UP (ref 80–100)
MONOCYTES # BLD AUTO: 0.99 K/UL — HIGH (ref 0–0.9)
MONOCYTES NFR BLD AUTO: 7.9 % — SIGNIFICANT CHANGE UP (ref 2–14)
NEUTROPHILS # BLD AUTO: 6.39 K/UL — SIGNIFICANT CHANGE UP (ref 1.8–7.4)
NEUTROPHILS NFR BLD AUTO: 51 % — SIGNIFICANT CHANGE UP (ref 43–77)
NRBC # BLD: 0 /100 WBCS — SIGNIFICANT CHANGE UP (ref 0–0)
PLATELET # BLD AUTO: 315 K/UL — SIGNIFICANT CHANGE UP (ref 150–400)
POTASSIUM SERPL-MCNC: 4.7 MMOL/L — SIGNIFICANT CHANGE UP (ref 3.5–5.3)
POTASSIUM SERPL-SCNC: 4.7 MMOL/L — SIGNIFICANT CHANGE UP (ref 3.5–5.3)
PROT SERPL-MCNC: 8.5 G/DL — HIGH (ref 6–8.3)
PROTHROM AB SERPL-ACNC: 12.8 SEC — SIGNIFICANT CHANGE UP (ref 10.6–13.6)
RBC # BLD: 4.57 M/UL — SIGNIFICANT CHANGE UP (ref 4.2–5.8)
RBC # FLD: 14.4 % — SIGNIFICANT CHANGE UP (ref 10.3–14.5)
SODIUM SERPL-SCNC: 135 MMOL/L — SIGNIFICANT CHANGE UP (ref 135–145)
TROPONIN T SERPL-MCNC: <0.01 NG/ML — SIGNIFICANT CHANGE UP (ref 0–0.01)
WBC # BLD: 12.53 K/UL — HIGH (ref 3.8–10.5)
WBC # FLD AUTO: 12.53 K/UL — HIGH (ref 3.8–10.5)

## 2020-07-02 PROCEDURE — 85730 THROMBOPLASTIN TIME PARTIAL: CPT

## 2020-07-02 PROCEDURE — 71045 X-RAY EXAM CHEST 1 VIEW: CPT

## 2020-07-02 PROCEDURE — 99215 OFFICE O/P EST HI 40 MIN: CPT

## 2020-07-02 PROCEDURE — 71045 X-RAY EXAM CHEST 1 VIEW: CPT | Mod: 26

## 2020-07-02 PROCEDURE — 93000 ELECTROCARDIOGRAM COMPLETE: CPT

## 2020-07-02 PROCEDURE — 99284 EMERGENCY DEPT VISIT MOD MDM: CPT | Mod: 25

## 2020-07-02 PROCEDURE — 99285 EMERGENCY DEPT VISIT HI MDM: CPT

## 2020-07-02 PROCEDURE — 85610 PROTHROMBIN TIME: CPT

## 2020-07-02 PROCEDURE — 93010 ELECTROCARDIOGRAM REPORT: CPT

## 2020-07-02 PROCEDURE — 36415 COLL VENOUS BLD VENIPUNCTURE: CPT

## 2020-07-02 PROCEDURE — 83880 ASSAY OF NATRIURETIC PEPTIDE: CPT

## 2020-07-02 PROCEDURE — 85025 COMPLETE CBC W/AUTO DIFF WBC: CPT

## 2020-07-02 PROCEDURE — 84484 ASSAY OF TROPONIN QUANT: CPT

## 2020-07-02 PROCEDURE — 80053 COMPREHEN METABOLIC PANEL: CPT

## 2020-07-02 PROCEDURE — 93005 ELECTROCARDIOGRAM TRACING: CPT

## 2020-07-02 PROCEDURE — 71275 CT ANGIOGRAPHY CHEST: CPT

## 2020-07-02 PROCEDURE — 71275 CT ANGIOGRAPHY CHEST: CPT | Mod: 26

## 2020-07-02 RX ORDER — VALSARTAN 80 MG/1
1 TABLET ORAL
Qty: 0 | Refills: 0 | DISCHARGE

## 2020-07-02 NOTE — ED PROVIDER NOTE - OBJECTIVE STATEMENT
history of cad/ cabg, dm, htn, sent from cardiologist for eval of shortness of breath.  Says ever since he had covid infection in March he feels short of breath.  Worse with exertion.  Denies fever/chills, chest pain, nausea, headache, dizziness.  Compliant with meds.  No leg swelling.  Feels at rest but worse with exertion.

## 2020-07-02 NOTE — ED ADULT TRIAGE NOTE - CHIEF COMPLAINT QUOTE
Pt WILLAM from MD Johnson's office CO SOB x3 months.  Per EMS, pt desated to lows 90s at office.  Pt denies CP, Dizziness, N/V/D. Fevers.  Masked PTA.  R/O PE per EMS.

## 2020-07-02 NOTE — ED PROVIDER NOTE - PATIENT PORTAL LINK FT
You can access the FollowMyHealth Patient Portal offered by [facility] by registering at the following website: [email or web address]. By joining [facility]’s FollowMyHealth portal, you will also be able to view your health information using other applications (apps) compatible with our system.

## 2020-07-02 NOTE — ED ADULT NURSE NOTE - OBJECTIVE STATEMENT
c/o inermittetn dyspnea on exertion that started ~ 3 months ago. Pt states was referred by Dr. Johnson for CT to r/o PE. Denies chest pain, sob at rest, dizziness, fever, chills, or cough. PHX HTN, DM, and cardiac stents.

## 2020-07-02 NOTE — HISTORY OF PRESENT ILLNESS
[FreeTextEntry1] : 75 m LIMA to LAD,  stent to LCX and LAD HTN DM PAD calcified arteries on SYL/PVR GI bleed here for follow up had covid and is now sob and feels chest congestion \par \par \par ecg st rbbb

## 2020-07-02 NOTE — ED PROVIDER NOTE - CARE PROVIDER_API CALL
Franco Johnson  CARDIOLOGY  [address] [place] [address]  Phone: [phone]  Fax: [phone]  Follow Up Time:

## 2020-07-02 NOTE — ASSESSMENT
[FreeTextEntry1] : is sob ambulated oxygen dropped to 92% heart rate increased to 117 bpm will send to ED to rule out PE\par dm very poorly controlled referred to endo in the past \par htn borderline\par cad on GDMT \par claudication symptoms improved on meds\par fu after hospitalization\par \par \par

## 2020-07-02 NOTE — PHYSICAL EXAM
[General Appearance - Well Developed] : well developed [Normal Appearance] : normal appearance [Well Groomed] : well groomed [No Deformities] : no deformities [General Appearance - Well Nourished] : well nourished [General Appearance - In No Acute Distress] : no acute distress [Normal Conjunctiva] : the conjunctiva exhibited no abnormalities [Eyelids - No Xanthelasma] : the eyelids demonstrated no xanthelasmas [Normal Oral Mucosa] : normal oral mucosa [No Oral Pallor] : no oral pallor [No Oral Cyanosis] : no oral cyanosis [Normal Jugular Venous V Waves Present] : normal jugular venous V waves present [Normal Jugular Venous A Waves Present] : normal jugular venous A waves present [No Jugular Venous Wise A Waves] : no jugular venous wise A waves [Respiration, Rhythm And Depth] : normal respiratory rhythm and effort [Exaggerated Use Of Accessory Muscles For Inspiration] : no accessory muscle use [Auscultation Breath Sounds / Voice Sounds] : lungs were clear to auscultation bilaterally [Heart Rate And Rhythm] : heart rate and rhythm were normal [Heart Sounds] : normal S1 and S2 [Murmurs] : no murmurs present [Abdomen Tenderness] : non-tender [Abdomen Soft] : soft [Abdomen Mass (___ Cm)] : no abdominal mass palpated [Abnormal Walk] : normal gait [Gait - Sufficient For Exercise Testing] : the gait was sufficient for exercise testing [Nail Clubbing] : no clubbing of the fingernails [Cyanosis, Localized] : no localized cyanosis [Petechial Hemorrhages (___cm)] : no petechial hemorrhages [] : no rash [No Venous Stasis] : no venous stasis [Skin Color & Pigmentation] : normal skin color and pigmentation [Skin Lesions] : no skin lesions [No Skin Ulcers] : no skin ulcer [No Xanthoma] : no  xanthoma was observed [Oriented To Time, Place, And Person] : oriented to person, place, and time [Mood] : the mood was normal [No Anxiety] : not feeling anxious [Affect] : the affect was normal [FreeTextEntry1] : right lung crackles

## 2020-07-02 NOTE — ED PROVIDER NOTE - NSFOLLOWUPINSTRUCTIONS_ED_ALL_ED_FT
Please see Dr. Johnson next week for followup.  Return to the ER if symptoms worsen or other concerns.    Shortness of breath    Shortness of breath (dyspnea) means you have trouble breathing and could indicate a medical problem. Causes include lung disease, heart disease, low amount of red blood cells (anemia), poor physical fitness, being overweight, smoking, etc. Your health care provider today may not be able to find a cause for your shortness of breath after your exam. In this case, it is important to have a follow-up exam with your primary care physician as instructed. If medicines were prescribed, take them as directed for the full length of time directed. Refrain from tobacco products.    SEEK IMMEDIATE MEDICAL CARE IF YOU HAVE ANY OF THE FOLLOWING SYMPTOMS: worsening shortness of breath, chest pain, back pain, abdominal pain, fever, coughing up blood, lightheadedness/dizziness.  COVID-19  COVID-19 is a respiratory infection that is caused by a virus called severe acute respiratory syndrome coronavirus 2 (SARS-CoV-2). The disease is also known as coronavirus disease or novel coronavirus. In some people, the virus may not cause any symptoms. In others, it may cause a serious infection. The infection can get worse quickly and can lead to complications, such as:  Pneumonia, or infection of the lungs.Acute respiratory distress syndrome or ARDS. This is fluid build-up in the lungs.Acute respiratory failure. This is a condition in which there is not enough oxygen passing from the lungs to the body.Sepsis or septic shock. This is a serious bodily reaction to an infection.Blood clotting problems.Secondary infections due to bacteria or fungus.The virus that causes COVID-19 is contagious. This means that it can spread from person to person through droplets from coughs and sneezes (respiratory secretions).  What are the causes?  This illness is caused by a virus. You may catch the virus by:  Breathing in droplets from an infected person's cough or sneeze.Touching something, like a table or a doorknob, that was exposed to the virus (contaminated) and then touching your mouth, nose, or eyes.What increases the risk?  Risk for infection     You are more likely to be infected with this virus if you:  Live in or travel to an area with a COVID-19 outbreak.Come in contact with a sick person who recently traveled to an area with a COVID-19 outbreak.Provide care for or live with a person who is infected with COVID-19.Risk for serious illness    You are more likely to become seriously ill from the virus if you:  Are 65 years of age or older.Have a long-term disease that lowers your body's ability to fight infection (immunocompromised).Live in a nursing home or long-term care facility.Have a long-term (chronic) disease such as:  Chronic lung disease, including chronic obstructive pulmonary disease or asthmaHeart disease.Diabetes.Chronic kidney disease.Liver disease.Are obese.What are the signs or symptoms?  Symptoms of this condition can range from mild to severe. Symptoms may appear any time from 2 to 14 days after being exposed to the virus. They include:  A fever.A cough.Difficulty breathing.Chills.Muscle pains.A sore throat.Loss of taste or smell.Some people may also have stomach problems, such as nausea, vomiting, or diarrhea.  Other people may not have any symptoms of COVID-19.  How is this diagnosed?  This condition may be diagnosed based on:  Your signs and symptoms, especially if:  You live in an area with a COVID-19 outbreak.You recently traveled to or from an area where the virus is common.You provide care for or live with a person who was diagnosed with COVID-19.A physical exam.Lab tests, which may include:  A nasal swab to take a sample of fluid from your nose.A throat swab to take a sample of fluid from your throat.A sample of mucus from your lungs (sputum).Blood tests.Imaging tests, which may include, X-rays, CT scan, or ultrasound.How is this treated?  At present, there is no medicine to treat COVID-19. Medicines that treat other diseases are being used on a trial basis to see if they are effective against COVID-19.  Your health care provider will talk with you about ways to treat your symptoms. For most people, the infection is mild and can be managed at home with rest, fluids, and over-the-counter medicines.  Treatment for a serious infection usually takes places in a hospital intensive care unit (ICU). It may include one or more of the following treatments. These treatments are given until your symptoms improve.  Receiving fluids and medicines through an IV.Supplemental oxygen. Extra oxygen is given through a tube in the nose, a face mask, or a hoffman.Positioning you to lie on your stomach (prone position). This makes it easier for oxygen to get into the lungs.Continuous positive airway pressure (CPAP) or bi-level positive airway pressure (BPAP) machine. This treatment uses mild air pressure to keep the airways open. A tube that is connected to a motor delivers oxygen to the body.Ventilator. This treatment moves air into and out of the lungs by using a tube that is placed in your windpipe.Tracheostomy. This is a procedure to create a hole in the neck so that a breathing tube can be inserted.Extracorporeal membrane oxygenation (ECMO). This procedure gives the lungs a chance to recover by taking over the functions of the heart and lungs. It supplies oxygen to the body and removes carbon dioxide.Follow these instructions at home:  Lifestyle     If you are sick, stay home except to get medical care. Your health care provider will tell you how long to stay home. Call your health care provider before you go for medical care.Rest at home as told by your health care provider.Do not use any products that contain nicotine or tobacco, such as cigarettes, e-cigarettes, and chewing tobacco. If you need help quitting, ask your health care provider.Return to your normal activities as told by your health care provider. Ask your health care provider what activities are safe for you.General instructions     Take over-the-counter and prescription medicines only as told by your health care provider.Drink enough fluid to keep your urine pale yellow.Keep all follow-up visits as told by your health care provider. This is important.How is this prevented?     There is no vaccine to help prevent COVID-19 infection. However, there are steps you can take to protect yourself and others from this virus.  To protect yourself:     Do not travel to areas where COVID-19 is a risk. The areas where COVID-19 is reported change often. To identify high-risk areas and travel restrictions, check the CDC travel website: wwwnc.cdc.gov/travel/noticesIf you live in, or must travel to, an area where COVID-19 is a risk, take precautions to avoid infection.  Stay away from people who are sick.Wash your hands often with soap and water for 20 seconds. If soap and water are not available, use an alcohol-based hand .Avoid touching your mouth, face, eyes, or nose.Avoid going out in public, follow guidance from your state and local health authorities.If you must go out in public, wear a cloth face covering or face mask.Disinfect objects and surfaces that are frequently touched every day. This may include:  Counters and tables.Doorknobs and light switches.Sinks and faucets.Electronics, such as phones, remote controls, keyboards, computers, and tablets.To protect others:     If you have symptoms of COVID-19, take steps to prevent the virus from spreading to others.  If you think you have a COVID-19 infection, contact your health care provider right away. Tell your health care team that you think you may have a COVID-19 infection.Stay home. Leave your house only to seek medical care. Do not use public transport.Do not travel while you are sick.Wash your hands often with soap and water for 20 seconds. If soap and water are not available, use alcohol-based hand .Stay away from other members of your household. Let healthy household members care for children and pets, if possible. If you have to care for children or pets, wash your hands often and wear a mask. If possible, stay in your own room, separate from others. Use a different bathroom.Make sure that all people in your household wash their hands well and often.Cough or sneeze into a tissue or your sleeve or elbow. Do not cough or sneeze into your hand or into the air.Wear a cloth face covering or face mask.Where to find more information  Centers for Disease Control and Prevention: www.cdc.gov/coronavirus/2019-ncov/index.htmlWGr[facility] Health Organization: www.who.int/health-topics/coronavirusContact a health care provider if:  You live in or have traveled to an area where COVID-19 is a risk and you have symptoms of the infection.You have had contact with someone who has COVID-19 and you have symptoms of the infection.Get help right away if:  You have trouble breathing.You have pain or pressure in your chest.You have confusion.You have bluish lips and fingernails.You have difficulty waking from sleep.You have symptoms that get worse.These symptoms may represent a serious problem that is an emergency. Do not wait to see if the symptoms will go away. Get medical help right away. Call your local emergency services (911 in the [place] Do not drive yourself to the hospital. Let the emergency medical personnel know if you think you have COVID-19.   Summary  COVID-19 is a respiratory infection that is caused by a virus. It is also known as coronavirus disease or novel coronavirus. It can cause serious infections, such as pneumonia, acute respiratory distress syndrome, acute respiratory failure, or sepsis.The virus that causes COVID-19 is contagious. This means that it can spread from person to person through droplets from coughs and sneezes.You are more likely to develop a serious illness if you are 65 years of age or older, have a weak immunity, live in a nursing home, or have chronic disease.There is no medicine to treat COVID-19. Your health care provider will talk with you about ways to treat your symptoms.Take steps to protect yourself and others from infection. Wash your hands often and disinfect objects and surfaces that are frequently touched every day. Stay away from people who are sick and wear a mask if you are sick.This information is not intended to replace advice given to you by your health care provider. Make sure you discuss any questions you have with your health care provider.

## 2020-07-02 NOTE — ED ADULT NURSE NOTE - CHPI ED NUR SYMPTOMS NEG
no headache/no hemoptysis/no body aches/no cough/no edema/no diaphoresis/no chest pain/no chills/no fever

## 2020-07-02 NOTE — ED PROVIDER NOTE - CLINICAL SUMMARY MEDICAL DECISION MAKING FREE TEXT BOX
here with GAMBINO since covid diagnosis a few months ago.  no fever/chills, cough, signs of infection.  no chest pain.  labs with normal bnp, neg trop.  wbc mildly elevated.  cta of chest done to r/o pe and neg but shows changes consistent with prior covid infection, likely cause of symptoms.  discussed with Dr. Johnson, will f/u in office next week. return precautions discussed

## 2020-07-06 LAB
ALBUMIN SERPL ELPH-MCNC: 4.4 G/DL
ALP BLD-CCNC: 77 U/L
ALT SERPL-CCNC: 12 U/L
ANION GAP SERPL CALC-SCNC: 13 MMOL/L
AST SERPL-CCNC: 20 U/L
BASOPHILS # BLD AUTO: 0.11 K/UL
BASOPHILS NFR BLD AUTO: 1 %
BILIRUB SERPL-MCNC: 0.6 MG/DL
BUN SERPL-MCNC: 17 MG/DL
CALCIUM SERPL-MCNC: 10.2 MG/DL
CHLORIDE SERPL-SCNC: 102 MMOL/L
CHOLEST SERPL-MCNC: 86 MG/DL
CHOLEST/HDLC SERPL: 2.4 RATIO
CO2 SERPL-SCNC: 20 MMOL/L
CREAT SERPL-MCNC: 0.78 MG/DL
EOSINOPHIL # BLD AUTO: 0.97 K/UL
EOSINOPHIL NFR BLD AUTO: 9.1 %
ESTIMATED AVERAGE GLUCOSE: 171 MG/DL
GLUCOSE SERPL-MCNC: 266 MG/DL
HBA1C MFR BLD HPLC: 7.6 %
HCT VFR BLD CALC: 39.8 %
HDLC SERPL-MCNC: 37 MG/DL
HGB BLD-MCNC: 12.5 G/DL
IMM GRANULOCYTES NFR BLD AUTO: 0.3 %
LDLC SERPL CALC-MCNC: 32 MG/DL
LYMPHOCYTES # BLD AUTO: 3.08 K/UL
LYMPHOCYTES NFR BLD AUTO: 28.8 %
MAN DIFF?: NORMAL
MCHC RBC-ENTMCNC: 28.1 PG
MCHC RBC-ENTMCNC: 31.4 GM/DL
MCV RBC AUTO: 89.4 FL
MONOCYTES # BLD AUTO: 0.7 K/UL
MONOCYTES NFR BLD AUTO: 6.5 %
NEUTROPHILS # BLD AUTO: 5.8 K/UL
NEUTROPHILS NFR BLD AUTO: 54.3 %
NT-PROBNP SERPL-MCNC: 17 PG/ML
PLATELET # BLD AUTO: 334 K/UL
POTASSIUM SERPL-SCNC: 5.1 MMOL/L
PROT SERPL-MCNC: 7.6 G/DL
RBC # BLD: 4.45 M/UL
RBC # FLD: 14.8 %
SARS-COV-2 IGG SERPL IA-ACNC: 7.42 INDEX
SARS-COV-2 IGG SERPL QL IA: POSITIVE
SODIUM SERPL-SCNC: 135 MMOL/L
TRIGL SERPL-MCNC: 89 MG/DL
WBC # FLD AUTO: 10.69 K/UL

## 2020-07-09 ENCOUNTER — NON-APPOINTMENT (OUTPATIENT)
Age: 75
End: 2020-07-09

## 2020-07-09 ENCOUNTER — APPOINTMENT (OUTPATIENT)
Dept: HEART AND VASCULAR | Facility: CLINIC | Age: 75
End: 2020-07-09
Payer: COMMERCIAL

## 2020-07-09 VITALS
HEART RATE: 103 BPM | TEMPERATURE: 98.5 F | BODY MASS INDEX: 24.54 KG/M2 | OXYGEN SATURATION: 94 % | HEIGHT: 60 IN | WEIGHT: 125 LBS | DIASTOLIC BLOOD PRESSURE: 58 MMHG | SYSTOLIC BLOOD PRESSURE: 138 MMHG

## 2020-07-09 PROCEDURE — 99214 OFFICE O/P EST MOD 30 MIN: CPT

## 2020-07-09 RX ORDER — AMLODIPINE BESYLATE 5 MG/1
5 TABLET ORAL
Qty: 90 | Refills: 0 | Status: DISCONTINUED | COMMUNITY
Start: 2020-06-01 | End: 2020-07-09

## 2020-07-10 NOTE — ASSESSMENT
[FreeTextEntry1] : dm very poorly controlled referred to endo in the past \par htn borderline will hold off on adding a diuretic i think some of it driven by his sob will do echo\par cad on GDMT \par claudication symptoms improved on meds\par fu in one month \par \par \par

## 2020-07-10 NOTE — PHYSICAL EXAM
[General Appearance - Well Developed] : well developed [Normal Appearance] : normal appearance [General Appearance - Well Nourished] : well nourished [No Deformities] : no deformities [Well Groomed] : well groomed [General Appearance - In No Acute Distress] : no acute distress [Normal Conjunctiva] : the conjunctiva exhibited no abnormalities [Eyelids - No Xanthelasma] : the eyelids demonstrated no xanthelasmas [Normal Oral Mucosa] : normal oral mucosa [No Oral Cyanosis] : no oral cyanosis [No Oral Pallor] : no oral pallor [Normal Jugular Venous V Waves Present] : normal jugular venous V waves present [Normal Jugular Venous A Waves Present] : normal jugular venous A waves present [No Jugular Venous Wise A Waves] : no jugular venous wise A waves [Exaggerated Use Of Accessory Muscles For Inspiration] : no accessory muscle use [Respiration, Rhythm And Depth] : normal respiratory rhythm and effort [Auscultation Breath Sounds / Voice Sounds] : lungs were clear to auscultation bilaterally [Heart Rate And Rhythm] : heart rate and rhythm were normal [Heart Sounds] : normal S1 and S2 [Murmurs] : no murmurs present [Abdomen Soft] : soft [Abdomen Tenderness] : non-tender [Gait - Sufficient For Exercise Testing] : the gait was sufficient for exercise testing [Abdomen Mass (___ Cm)] : no abdominal mass palpated [Abnormal Walk] : normal gait [Nail Clubbing] : no clubbing of the fingernails [Cyanosis, Localized] : no localized cyanosis [Skin Color & Pigmentation] : normal skin color and pigmentation [Petechial Hemorrhages (___cm)] : no petechial hemorrhages [] : no rash [Skin Lesions] : no skin lesions [No Venous Stasis] : no venous stasis [No Skin Ulcers] : no skin ulcer [No Xanthoma] : no  xanthoma was observed [Mood] : the mood was normal [Oriented To Time, Place, And Person] : oriented to person, place, and time [Affect] : the affect was normal [No Anxiety] : not feeling anxious [FreeTextEntry1] : right lung crackles

## 2020-07-10 NOTE — HISTORY OF PRESENT ILLNESS
[FreeTextEntry1] : 75 m LIMA to LAD,  stent to LCX and LAD HTN DM PAD calcified arteries on SYL/PVR GI bleed here for follow up had covid and is now sob and feels chest congestion sent to ED to rule out PE as he desaturated while walking CT chest done consistent with fibrosis due to COVID \par \par \par ecg st [facility]

## 2020-07-15 ENCOUNTER — APPOINTMENT (OUTPATIENT)
Dept: PULMONOLOGY | Facility: CLINIC | Age: 75
End: 2020-07-15
Payer: COMMERCIAL

## 2020-07-15 VITALS
WEIGHT: 125 LBS | OXYGEN SATURATION: 96 % | TEMPERATURE: 97.8 F | HEIGHT: 60 IN | HEART RATE: 102 BPM | DIASTOLIC BLOOD PRESSURE: 60 MMHG | BODY MASS INDEX: 24.54 KG/M2 | SYSTOLIC BLOOD PRESSURE: 130 MMHG

## 2020-07-15 PROCEDURE — 99204 OFFICE O/P NEW MOD 45 MIN: CPT

## 2020-07-15 NOTE — CONSULT LETTER
[Dear  ___] : Dear  [unfilled], [Please see my note below.] : Please see my note below. [( Thank you for referring [unfilled] for consultation for _____ )] : Thank you for referring [unfilled] for consultation for [unfilled] [Sincerely,] : Sincerely, [Consult Closing:] : Thank you very much for allowing me to participate in the care of this patient.  If you have any questions, please do not hesitate to contact me. [FreeTextEntry2] : Franco Johnson MD\par [address]\[place] [address] [FreeTextEntry3] : Sandie Carter MD, FCCP\par

## 2020-07-15 NOTE — HISTORY OF PRESENT ILLNESS
[TextBox_4] : 07/15/2020: Asked to evaluate patient by Dr Johnson for dyspnea. He has coronary disease. 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. 3 mos ago he had Covid - fever, myalgias, cough, no dyspnea, isolated at home, not admitted. Now Ab positive. Returned to work last month and now is dyspneic with walking. Not dyspneic at rest. Never had lung disease. Quit smoking in his 40s. Lives in [place], born in [place]par

## 2020-07-15 NOTE — PHYSICAL EXAM
[No Acute Distress] : no acute distress [Normal Appearance] : normal appearance [Normal Rate/Rhythm] : normal rate/rhythm [Normal S1, S2] : normal s1, s2 [No Murmurs] : no murmurs [Benign] : benign [No Edema] : no edema [No Clubbing] : no clubbing [Normal Color/ Pigmentation] : normal color/ pigmentation [Normal Affect] : normal affect [TextBox_68] : morena briceño, some squeaks

## 2020-07-15 NOTE — ASSESSMENT
[FreeTextEntry1] : Data reviewed:\par \par CTA chest [facility] 7/2020 personally reviewed : lots of motion artifact but appearance of ground glass and some reticulation\par CXR 7/2020 [facility] is markedly improved compared to 4/2020\par \par Impression:\par Post-Covid lung disease\par \par Plan:\par He clearly has lung disease following Covid. He has an abnormal CXR and CT and he desaturates to 90% on exertion. We do not know what if any treatment is beneficial in this setting. We sometimes try steroids, but that will certainly make his diabetes worse and may or may not help his lungs. He's clearly improved from April. We agreed to follow up in 2-3 weeks and repeat a CXR and measurement of exertional sat.

## 2020-07-21 ENCOUNTER — APPOINTMENT (OUTPATIENT)
Dept: HEART AND VASCULAR | Facility: CLINIC | Age: 75
End: 2020-07-21
Payer: COMMERCIAL

## 2020-07-21 PROCEDURE — 93306 TTE W/DOPPLER COMPLETE: CPT

## 2020-08-05 ENCOUNTER — APPOINTMENT (OUTPATIENT)
Dept: HEART AND VASCULAR | Facility: CLINIC | Age: 75
End: 2020-08-05
Payer: COMMERCIAL

## 2020-08-05 ENCOUNTER — NON-APPOINTMENT (OUTPATIENT)
Age: 75
End: 2020-08-05

## 2020-08-05 VITALS
TEMPERATURE: 98.6 F | OXYGEN SATURATION: 96 % | WEIGHT: 125.99 LBS | DIASTOLIC BLOOD PRESSURE: 58 MMHG | SYSTOLIC BLOOD PRESSURE: 144 MMHG | HEART RATE: 96 BPM | BODY MASS INDEX: 24.74 KG/M2 | HEIGHT: 60 IN

## 2020-08-05 PROCEDURE — 93000 ELECTROCARDIOGRAM COMPLETE: CPT

## 2020-08-05 PROCEDURE — 99214 OFFICE O/P EST MOD 30 MIN: CPT

## 2020-08-06 ENCOUNTER — APPOINTMENT (OUTPATIENT)
Dept: PULMONOLOGY | Facility: CLINIC | Age: 75
End: 2020-08-06
Payer: COMMERCIAL

## 2020-08-06 ENCOUNTER — APPOINTMENT (OUTPATIENT)
Dept: INTERNAL MEDICINE | Facility: CLINIC | Age: 75
End: 2020-08-06
Payer: COMMERCIAL

## 2020-08-06 VITALS
HEIGHT: 60 IN | DIASTOLIC BLOOD PRESSURE: 64 MMHG | TEMPERATURE: 98.2 F | BODY MASS INDEX: 25.13 KG/M2 | SYSTOLIC BLOOD PRESSURE: 149 MMHG | OXYGEN SATURATION: 97 % | HEART RATE: 75 BPM | WEIGHT: 128 LBS

## 2020-08-06 VITALS
HEIGHT: 60 IN | BODY MASS INDEX: 24.54 KG/M2 | DIASTOLIC BLOOD PRESSURE: 70 MMHG | SYSTOLIC BLOOD PRESSURE: 140 MMHG | WEIGHT: 125 LBS | TEMPERATURE: 97.3 F | OXYGEN SATURATION: 96 % | HEART RATE: 93 BPM

## 2020-08-06 PROCEDURE — 99213 OFFICE O/P EST LOW 20 MIN: CPT | Mod: 25

## 2020-08-06 PROCEDURE — G0009: CPT

## 2020-08-06 PROCEDURE — 90732 PPSV23 VACC 2 YRS+ SUBQ/IM: CPT

## 2020-08-06 PROCEDURE — 71046 X-RAY EXAM CHEST 2 VIEWS: CPT

## 2020-08-06 PROCEDURE — 99204 OFFICE O/P NEW MOD 45 MIN: CPT | Mod: GC,25

## 2020-08-06 RX ORDER — PNEUMOCOCCAL VACCINE POLYVALENT 25; 25; 25; 25; 25; 25; 25; 25; 25; 25; 25; 25; 25; 25; 25; 25; 25; 25; 25; 25; 25; 25; 25 UG/.5ML; UG/.5ML; UG/.5ML; UG/.5ML; UG/.5ML; UG/.5ML; UG/.5ML; UG/.5ML; UG/.5ML; UG/.5ML; UG/.5ML; UG/.5ML; UG/.5ML; UG/.5ML; UG/.5ML; UG/.5ML; UG/.5ML; UG/.5ML; UG/.5ML; UG/.5ML; UG/.5ML; UG/.5ML; UG/.5ML
25 INJECTION, SOLUTION INTRAMUSCULAR; SUBCUTANEOUS
Qty: 1 | Refills: 0 | Status: DISCONTINUED | COMMUNITY
Start: 2020-08-06 | End: 2020-08-06

## 2020-08-06 NOTE — PHYSICAL EXAM
[No Acute Distress] : no acute distress [Normal S1, S2] : normal s1, s2 [Normal Rate/Rhythm] : normal rate/rhythm [No Murmurs] : no murmurs [TextBox_68] : basilar rales

## 2020-08-06 NOTE — PHYSICAL EXAM
[General Appearance - Well Developed] : well developed [Normal Appearance] : normal appearance [Well Groomed] : well groomed [General Appearance - In No Acute Distress] : no acute distress [General Appearance - Well Nourished] : well nourished [No Deformities] : no deformities [Eyelids - No Xanthelasma] : the eyelids demonstrated no xanthelasmas [Normal Conjunctiva] : the conjunctiva exhibited no abnormalities [No Oral Pallor] : no oral pallor [No Oral Cyanosis] : no oral cyanosis [Normal Oral Mucosa] : normal oral mucosa [Normal Jugular Venous V Waves Present] : normal jugular venous V waves present [Normal Jugular Venous A Waves Present] : normal jugular venous A waves present [No Jugular Venous Wise A Waves] : no jugular venous wise A waves [Exaggerated Use Of Accessory Muscles For Inspiration] : no accessory muscle use [Auscultation Breath Sounds / Voice Sounds] : lungs were clear to auscultation bilaterally [Respiration, Rhythm And Depth] : normal respiratory rhythm and effort [Heart Rate And Rhythm] : heart rate and rhythm were normal [Heart Sounds] : normal S1 and S2 [Murmurs] : no murmurs present [Abdomen Soft] : soft [Abdomen Tenderness] : non-tender [Abdomen Mass (___ Cm)] : no abdominal mass palpated [Abnormal Walk] : normal gait [Gait - Sufficient For Exercise Testing] : the gait was sufficient for exercise testing [Nail Clubbing] : no clubbing of the fingernails [Petechial Hemorrhages (___cm)] : no petechial hemorrhages [Cyanosis, Localized] : no localized cyanosis [] : no rash [No Venous Stasis] : no venous stasis [Skin Color & Pigmentation] : normal skin color and pigmentation [No Skin Ulcers] : no skin ulcer [Skin Lesions] : no skin lesions [No Xanthoma] : no  xanthoma was observed [Oriented To Time, Place, And Person] : oriented to person, place, and time [Mood] : the mood was normal [Affect] : the affect was normal [No Anxiety] : not feeling anxious [FreeTextEntry1] : right lung crackles

## 2020-08-06 NOTE — ASSESSMENT
[FreeTextEntry1] : dm has gotten better \par htn borderline add coreg 6.25 po bid \par cad on GDMT \par claudication symptoms improved on meds\par fu in three months \par \par \par

## 2020-08-06 NOTE — HISTORY OF PRESENT ILLNESS
[FreeTextEntry1] : 75 m LIMA to LAD,  stent to LCX and LAD HTN DM PAD calcified arteries on SYL/PVR GI bleed here for follow up had covid and is now sob and feels chest congestion sent to ED to rule out PE as he desaturated while walking CT chest done consistent with fibrosis due to COVID feels ok still sob when he is walking up hill\par \par \par ecg st [facility]

## 2020-08-06 NOTE — PROCEDURE
[Normal] : 3. No pericardial effusion. [de-identified] : Dr. Franco Johnson (card) [de-identified] : Nicole Petri-Schoener, ENOCCS [de-identified] : 1.1 [de-identified] : 0.9 [de-identified] : shortness of breath [de-identified] : 3.7 [de-identified] : 1.8 [de-identified] : 3.0 [de-identified] : 60-48 [de-identified] : 33 mmHg [de-identified] : 3.3 [de-identified] : Mild concentric left ventricular hypertrophy. Age appropriate diastolic function [de-identified] : The left atrium is normal in size. The left atrial volume index is 23 ml/m2. [de-identified] : There is trace pulmonic insufficiency. [de-identified] : There is minimal tricuspid regurgitation. [de-identified] : Mitral annular calcification [de-identified] : The inferior vena cava measures 1.2 cm. [FreeTextEntry1] : : 1945\par Age: 75 y\par Sex: M\par BP: 130/60\par Height: 152 cm\par Weight: 57 kg\par BSA: 1.5 m2\par

## 2020-08-06 NOTE — HISTORY OF PRESENT ILLNESS
[TextBox_4] : 07/15/2020: Asked to evaluate patient by Dr Johnson for dyspnea. He has coronary disease. 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 3 mos ago he had Covid - fever, myalgias, cough, no dyspnea, isolated at home, not admitted. Now Ab positive. Returned to work last month and now is dyspneic with walking. Not dyspneic at rest. Never had lung disease. Quit smoking in his 40s. Lives in [place], born in [facility].\par \par 8/6/20: Feels better than last visit. Not working but walking 30-60 min a day, w dyspnea. No other changes. Blood glucose is well controlled.

## 2020-08-06 NOTE — ASSESSMENT
[FreeTextEntry1] : Data reviewed:\par \par CTA chest [facility] 7/2020: lots of motion artifact but appearance of ground glass and some reticulation\par CXR 7/2020 [facility] is markedly improved compared to 4/2020\par PA/lat CXR in office 8/6/20: no sig change from a month ago in cb infiltrates\par \par Impression:\par Post-Covid lung disease\par \par Plan:\par Although he improved from April to July he has had no significant further improvement objectively based on imaging; he still desaturates to 90% on brisk walking down our [place] 3x and back. At this point I do recommend a trial of steroids understanding that this will raise his blood glucose. Start prednisone 30mg daily w omeprazole and follow up in 1-2 weeks.

## 2020-08-20 ENCOUNTER — APPOINTMENT (OUTPATIENT)
Dept: PULMONOLOGY | Facility: CLINIC | Age: 75
End: 2020-08-20
Payer: COMMERCIAL

## 2020-08-20 VITALS
HEIGHT: 60 IN | BODY MASS INDEX: 25.13 KG/M2 | WEIGHT: 128 LBS | HEART RATE: 73 BPM | DIASTOLIC BLOOD PRESSURE: 60 MMHG | OXYGEN SATURATION: 98 % | SYSTOLIC BLOOD PRESSURE: 140 MMHG | TEMPERATURE: 96.3 F

## 2020-08-20 PROCEDURE — 99213 OFFICE O/P EST LOW 20 MIN: CPT | Mod: 25

## 2020-08-20 PROCEDURE — 71046 X-RAY EXAM CHEST 2 VIEWS: CPT

## 2020-08-25 ENCOUNTER — APPOINTMENT (OUTPATIENT)
Dept: INTERNAL MEDICINE | Facility: CLINIC | Age: 75
End: 2020-08-25

## 2020-09-03 ENCOUNTER — NON-APPOINTMENT (OUTPATIENT)
Age: 75
End: 2020-09-03

## 2020-09-03 ENCOUNTER — APPOINTMENT (OUTPATIENT)
Dept: OPHTHALMOLOGY | Facility: CLINIC | Age: 75
End: 2020-09-03

## 2020-09-08 ENCOUNTER — APPOINTMENT (OUTPATIENT)
Dept: INTERNAL MEDICINE | Facility: CLINIC | Age: 75
End: 2020-09-08

## 2020-09-09 ENCOUNTER — APPOINTMENT (OUTPATIENT)
Dept: PULMONOLOGY | Facility: CLINIC | Age: 75
End: 2020-09-09
Payer: COMMERCIAL

## 2020-09-09 VITALS — OXYGEN SATURATION: 91 % | HEART RATE: 106 BPM

## 2020-09-09 VITALS
OXYGEN SATURATION: 96 % | BODY MASS INDEX: 25.13 KG/M2 | DIASTOLIC BLOOD PRESSURE: 74 MMHG | SYSTOLIC BLOOD PRESSURE: 110 MMHG | HEART RATE: 82 BPM | TEMPERATURE: 97.3 F | WEIGHT: 128 LBS | HEIGHT: 60 IN

## 2020-09-09 DIAGNOSIS — Z86.39 PERSONAL HISTORY OF OTHER ENDOCRINE, NUTRITIONAL AND METABOLIC DISEASE: ICD-10-CM

## 2020-09-09 PROCEDURE — 99213 OFFICE O/P EST LOW 20 MIN: CPT | Mod: GC

## 2020-09-09 NOTE — ASSESSMENT
[FreeTextEntry1] : Data reviewed:\par \par CTA chest [facility] 7/2020: lots of motion artifact but appearance of ground glass and some reticulation\par CXR 7/2020 [facility] is markedly improved compared to 4/2020\par PA/lat CXR in office 8/20/20: no sig change\par \par Impression:\par Post-Covid lung disease now on prednisone 30mg x 2 weeks\par \par Plan:\par There is mild subjective improvement and mild evidence of objective improvement with less desaturation on more walking (4x down [place] and back). \par Contt prednisone 30mg daily w omeprazole and follow up again in 2 weeks and at that point would hope to taper.

## 2020-09-09 NOTE — HISTORY OF PRESENT ILLNESS
[TextBox_4] : 07/15/2020: Asked to evaluate patient by Dr Johnson for dyspnea. He has coronary disease. 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 3 mos ago he had Covid - fever, myalgias, cough, no dyspnea, isolated at home, not admitted. Now Ab positive. Returned to work last month and now is dyspneic with walking. Not dyspneic at rest. Never had lung disease. Quit smoking in his 40s. Lives in [place], [facility]par \par 8/6/20: Feels better than last visit. Not working but walking 30-60 min a day, w dyspnea. No other changes. Blood glucose is well controlled. Started him on 30mg of prednisone for the nonresolving infiltrates w exertional hypoxia.\par \par 8/20/20: Feels a little better on 30mg prednisone. Blood glucose is up. No other changes. Taking Nexium, no GI symptoms.

## 2020-09-10 PROBLEM — Z86.39 HISTORY OF TYPE 2 DIABETES MELLITUS: Status: RESOLVED | Noted: 2020-09-10 | Resolved: 2020-09-10

## 2020-09-10 NOTE — HISTORY OF PRESENT ILLNESS
[< 30 pack-years] : < 30 pack-years [Former] : former [Never] : never [TextBox_4] : 07/15/2020: Asked to evaluate patient by Dr Johnson for dyspnea. He has coronary disease. 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 3 mos ago he had Covid - fever, myalgias, cough, no dyspnea, isolated at home, not admitted. Now Ab positive. Returned to work last month and now is dyspneic with walking. Not dyspneic at rest. Never had lung disease. Quit smoking in his 40s. Lives in [place], born in [facility].\par \par 8/6/20: Feels better than last visit. Not working but walking 30-60 min a day, w dyspnea. No other changes. Blood glucose is well controlled. Started him on 30mg of prednisone for the nonresolving infiltrates w exertional hypoxia.\par \par 8/20/20: Feels a little better on 30mg prednisone. Blood glucose is up. No other changes. Taking Nexium, no GI symptoms.\par \par 09/09/20: Rapidly tapered  prednisone 5 days ago because of leg swelling. SOB walking uphill or talking stairs, on nexium. he feels that steroids have helped his symptoms. \par  [YearQuit] : 1995

## 2020-09-10 NOTE — PHYSICAL EXAM
[No Acute Distress] : no acute distress [Normal Rate/Rhythm] : normal rate/rhythm [Normal S1, S2] : normal s1, s2 [No Murmurs] : no murmurs [Normal Oropharynx] : normal oropharynx [III] : Mallampati Class: III [No Neck Mass] : no neck mass [Normal Appearance] : normal appearance [No Resp Distress] : no resp distress [No Abnormalities] : no abnormalities [Benign] : benign [No Cyanosis] : no cyanosis [Normal Gait] : normal gait [No Clubbing] : no clubbing [FROM] : FROM [2+ Pitting] : 2+ pitting [Normal Color/ Pigmentation] : normal color/ pigmentation [No Focal Deficits] : no focal deficits [Normal Affect] : normal affect [Oriented x3] : oriented x3 [TextBox_68] : basilar rales

## 2020-09-10 NOTE — ASSESSMENT
[FreeTextEntry1] : Data reviewed:\par \par CTA chest H 7/2020: lots of motion artifact but appearance of ground glass and some reticulation.\par CXR 7/2020 [facility] is markedly improved compared to 4/2020\par \par Impression:\par Post-Covid lung disease, on Prednisone 30mg since 08/06/2020, patient has tapered dose rapidly due to leg swelling. Although no worsening of symptoms yet. Does desat 96% --> 91 % on walking. Also HPA suppression expected . Would taper slowly. Patient aggreable. \par \par Plan:\par - Cont prednisone 20mg daily x 10 days --> 10mg x 10 days. \par - C/w omeprazole\par - follow up again in 3 weeks \par \par --\par Attending Addendum\par \par Seen and examined by me. Has been self-tapering prednisone because of leg edema. Doesn't feel any worse while tapering prednisone, but we suggest a longer taper and will bring him back to 20mg and taper from there. Return 3 weeks.

## 2020-09-10 NOTE — REVIEW OF SYSTEMS
[Diabetes] : diabetes [Negative] : Gastrointestinal [SOB on Exertion] : sob on exertion [Fever] : no fever [Chills] : no chills [Cough] : no cough [Hemoptysis] : no hemoptysis [Chest Tightness] : no chest tightness [Sputum] : no sputum [Orthopnea] : no orthopnea [Palpitations] : no palpitations [Nasal Discharge] : no nasal discharge

## 2020-09-16 ENCOUNTER — APPOINTMENT (OUTPATIENT)
Dept: HEART AND VASCULAR | Facility: CLINIC | Age: 75
End: 2020-09-16
Payer: COMMERCIAL

## 2020-09-16 VITALS
SYSTOLIC BLOOD PRESSURE: 122 MMHG | TEMPERATURE: 98.4 F | DIASTOLIC BLOOD PRESSURE: 58 MMHG | WEIGHT: 128.99 LBS | HEIGHT: 60 IN | HEART RATE: 90 BPM | OXYGEN SATURATION: 97 % | BODY MASS INDEX: 25.32 KG/M2

## 2020-09-16 PROCEDURE — 99214 OFFICE O/P EST MOD 30 MIN: CPT

## 2020-09-16 NOTE — ASSESSMENT
[FreeTextEntry1] : dm has gotten better \par htn controlled oon guideline med therapy no further medications \par cad on GDMT \par claudication symptoms improved on meds\par fu in three months \par \par \par

## 2020-09-16 NOTE — HISTORY OF PRESENT ILLNESS
[FreeTextEntry1] : 75 m LIMA to LAD,  stent to LCX and LAD HTN DM PAD calcified arteries on SYL/PVR GI bleed here for follow up had covid and is now sob and feels chest congestion sent to ED to rule out PE as he desaturated while walking CT chest done consistent with fibrosis due to COVID now on prednisone taper feels much better\par \par \par ecg st rbbb

## 2020-09-16 NOTE — PROCEDURE
[Normal] : 3. No pericardial effusion. [de-identified] : Dr. Franco Johnson (card) [de-identified] : 1.1 [de-identified] : shortness of breath [de-identified] : Nicole Petri-Schoener, ENOCCS [de-identified] : 0.9 [de-identified] : 1.8 [de-identified] : 3.7 [de-identified] : 3.0 [de-identified] : 3.3 [de-identified] : 33 mmHg [de-identified] : 60-64 [de-identified] : Mild concentric left ventricular hypertrophy. Age appropriate diastolic function [de-identified] : The left atrium is normal in size. The left atrial volume index is 23 ml/m2. [de-identified] : There is minimal tricuspid regurgitation. [de-identified] : There is trace pulmonic insufficiency. [de-identified] : Mitral annular calcification [de-identified] : The inferior vena cava measures 1.2 cm. [FreeTextEntry1] : : 1945\par Age: 75 y\par Sex: M\par BP: 130/60\par Height: 152 cm\par Weight: 57 kg\par BSA: 1.5 m2\par

## 2020-09-16 NOTE — PHYSICAL EXAM
[General Appearance - Well Developed] : well developed [Normal Appearance] : normal appearance [Well Groomed] : well groomed [General Appearance - Well Nourished] : well nourished [General Appearance - In No Acute Distress] : no acute distress [No Deformities] : no deformities [Normal Conjunctiva] : the conjunctiva exhibited no abnormalities [Eyelids - No Xanthelasma] : the eyelids demonstrated no xanthelasmas [Normal Oral Mucosa] : normal oral mucosa [No Oral Pallor] : no oral pallor [No Oral Cyanosis] : no oral cyanosis [Normal Jugular Venous V Waves Present] : normal jugular venous V waves present [Normal Jugular Venous A Waves Present] : normal jugular venous A waves present [No Jugular Venous Wise A Waves] : no jugular venous wise A waves [Exaggerated Use Of Accessory Muscles For Inspiration] : no accessory muscle use [Auscultation Breath Sounds / Voice Sounds] : lungs were clear to auscultation bilaterally [Respiration, Rhythm And Depth] : normal respiratory rhythm and effort [Murmurs] : no murmurs present [Heart Rate And Rhythm] : heart rate and rhythm were normal [Heart Sounds] : normal S1 and S2 [Abdomen Tenderness] : non-tender [Abdomen Soft] : soft [Abdomen Mass (___ Cm)] : no abdominal mass palpated [Abnormal Walk] : normal gait [Gait - Sufficient For Exercise Testing] : the gait was sufficient for exercise testing [Nail Clubbing] : no clubbing of the fingernails [Cyanosis, Localized] : no localized cyanosis [Petechial Hemorrhages (___cm)] : no petechial hemorrhages [Skin Color & Pigmentation] : normal skin color and pigmentation [] : no rash [No Venous Stasis] : no venous stasis [Skin Lesions] : no skin lesions [No Skin Ulcers] : no skin ulcer [No Xanthoma] : no  xanthoma was observed [Affect] : the affect was normal [Oriented To Time, Place, And Person] : oriented to person, place, and time [Mood] : the mood was normal [No Anxiety] : not feeling anxious [FreeTextEntry1] : right lung crackles

## 2020-09-30 ENCOUNTER — APPOINTMENT (OUTPATIENT)
Dept: PULMONOLOGY | Facility: CLINIC | Age: 75
End: 2020-09-30
Payer: COMMERCIAL

## 2020-09-30 VITALS
OXYGEN SATURATION: 95 % | BODY MASS INDEX: 21.33 KG/M2 | HEART RATE: 80 BPM | SYSTOLIC BLOOD PRESSURE: 130 MMHG | WEIGHT: 128 LBS | HEIGHT: 65 IN | TEMPERATURE: 96.6 F | DIASTOLIC BLOOD PRESSURE: 70 MMHG

## 2020-09-30 PROCEDURE — 90471 IMMUNIZATION ADMIN: CPT

## 2020-09-30 PROCEDURE — 90686 IIV4 VACC NO PRSV 0.5 ML IM: CPT | Mod: GC

## 2020-09-30 PROCEDURE — 99213 OFFICE O/P EST LOW 20 MIN: CPT | Mod: 25,GC

## 2020-09-30 RX ORDER — PREDNISONE 10 MG/1
10 TABLET ORAL DAILY
Qty: 40 | Refills: 0 | Status: COMPLETED | COMMUNITY
Start: 2020-09-09 | End: 2020-09-30

## 2020-09-30 RX ORDER — PREDNISONE 10 MG/1
10 TABLET ORAL
Qty: 90 | Refills: 0 | Status: COMPLETED | COMMUNITY
Start: 2020-08-06 | End: 2020-09-30

## 2020-09-30 NOTE — REVIEW OF SYSTEMS
[Fever] : no fever [Chills] : no chills [Dry Eyes] : no dry eyes [Cough] : no cough [Chest Tightness] : no chest tightness [Sputum] : no sputum [Dyspnea] : no dyspnea [Pleuritic Pain] : no pleuritic pain [Claudication] : no claudication [Palpitations] : no palpitations [Nasal Discharge] : no nasal discharge [GERD] : no gerd [Rash] : no rash [Headache] : no headache [Dizziness] : no dizziness [Depression] : no depression [Anxiety] : no anxiety

## 2020-09-30 NOTE — PHYSICAL EXAM
[No Acute Distress] : no acute distress [Normal Oropharynx] : normal oropharynx [II] : Mallampati Class: II [Normal Appearance] : normal appearance [No Neck Mass] : no neck mass [Normal Rate/Rhythm] : normal rate/rhythm [Normal S1, S2] : normal s1, s2 [No Murmurs] : no murmurs [No Resp Distress] : no resp distress [No Abnormalities] : no abnormalities [Benign] : benign [Normal Gait] : normal gait [No Clubbing] : no clubbing [No Cyanosis] : no cyanosis [1+ Pitting] : 1+ pitting [Normal Color/ Pigmentation] : normal color/ pigmentation [No Focal Deficits] : no focal deficits [TextBox_68] : chronic end inspiratory fine crackles bilateral lower lung zones.

## 2020-09-30 NOTE — HISTORY OF PRESENT ILLNESS
[TextBox_4] : 07/15/2020: Asked to evaluate patient by Dr Johnson for dyspnea. He has coronary disease. 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 3 mos ago he had Covid - fever, myalgias, cough, no dyspnea, isolated at home, not admitted. Now Ab positive. Returned to work last month and now is dyspneic with walking. Not dyspneic at rest. Never had lung disease. Quit smoking in his 40s. Lives in [place], born in [facility].\par \par 8/6/20: Feels better than last visit. Not working but walking 30-60 min a day, w dyspnea. No other changes. Blood glucose is well controlled. Started him on 30mg of prednisone for the nonresolving infiltrates w exertional hypoxia.\par \par 8/20/20: Feels a little better on 30mg prednisone. Blood glucose is up. No other changes. Taking Nexium, no GI symptoms.\par \par 9/30/20 [Raymundo]: on Prednisone 10 mg /day now, no worsening of SOB / cough, leg swelling is better, DM -2 with elevated BG in 300-400, not on insulin. taking nexium. feels lethargic sometimes.

## 2020-09-30 NOTE — ASSESSMENT
[FreeTextEntry1] : Data reviewed:\par \par CTA chest H 7/2020: lots of motion artifact but appearance of ground glass and some reticulation\par CXR 7/2020 [facility] is markedly improved compared to 4/2020\par PA/lat CXR in office 8/20/20: no sig change\par \par Impression:\par Post-Covid lung disease \par \par Plan:\par Mild improvement, importantly no worsening of symptoms with steroids taper. \par Spo2 95-->90 on walking x3 in hallway, due to fibrotic changes post covid. \par will taper to 5mg /day x 7 days ---> 5 mg every other day x7 days and then stop. \par asked to check BG frequently , it should get better with lower of Steroids.\par c/w omeprazole \par influenza immunization done. \par F/U in 2-3 months or as needed. \par \par --\par Attending Addendum\par \par Agree w above. He felt a bit better on initiation of steroids but objectively they have caused no meaningful improvement nor has he worsened as we have tapered. Taper to off as noted above and follow up 2-3 mos. Flu vaccine given. No objection to return to work from pulmonary point of view.\par

## 2020-10-14 ENCOUNTER — NON-APPOINTMENT (OUTPATIENT)
Age: 75
End: 2020-10-14

## 2020-10-14 ENCOUNTER — APPOINTMENT (OUTPATIENT)
Dept: HEART AND VASCULAR | Facility: CLINIC | Age: 75
End: 2020-10-14
Payer: COMMERCIAL

## 2020-10-14 VITALS
HEIGHT: 65 IN | DIASTOLIC BLOOD PRESSURE: 80 MMHG | SYSTOLIC BLOOD PRESSURE: 128 MMHG | HEART RATE: 78 BPM | BODY MASS INDEX: 21.66 KG/M2 | OXYGEN SATURATION: 96 % | WEIGHT: 130 LBS

## 2020-10-14 PROCEDURE — 93000 ELECTROCARDIOGRAM COMPLETE: CPT

## 2020-10-14 PROCEDURE — 99214 OFFICE O/P EST MOD 30 MIN: CPT

## 2020-10-14 RX ORDER — PREDNISONE 5 MG/1
5 TABLET ORAL DAILY
Qty: 30 | Refills: 0 | Status: DISCONTINUED | OUTPATIENT
Start: 2020-09-30 | End: 2020-10-14

## 2020-10-14 NOTE — HISTORY OF PRESENT ILLNESS
[FreeTextEntry1] : 75 m LIMA to LAD,  stent to LCX and LAD HTN DM PAD calcified arteries on SYL/PVR GI bleed here for follow up had covid and is now sob and feels chest congestion sent to ED to rule out PE as he desaturated while walking CT chest done consistent with fibrosis due to COVID now much better \par \par \par ecg st rbbb

## 2020-10-14 NOTE — PHYSICAL EXAM
[General Appearance - Well Developed] : well developed [Normal Appearance] : normal appearance [Well Groomed] : well groomed [General Appearance - Well Nourished] : well nourished [No Deformities] : no deformities [General Appearance - In No Acute Distress] : no acute distress [Normal Conjunctiva] : the conjunctiva exhibited no abnormalities [Eyelids - No Xanthelasma] : the eyelids demonstrated no xanthelasmas [Normal Oral Mucosa] : normal oral mucosa [No Oral Pallor] : no oral pallor [No Oral Cyanosis] : no oral cyanosis [Normal Jugular Venous A Waves Present] : normal jugular venous A waves present [No Jugular Venous Wise A Waves] : no jugular venous wise A waves [Normal Jugular Venous V Waves Present] : normal jugular venous V waves present [Respiration, Rhythm And Depth] : normal respiratory rhythm and effort [Exaggerated Use Of Accessory Muscles For Inspiration] : no accessory muscle use [Heart Rate And Rhythm] : heart rate and rhythm were normal [Auscultation Breath Sounds / Voice Sounds] : lungs were clear to auscultation bilaterally [Heart Sounds] : normal S1 and S2 [Murmurs] : no murmurs present [Abdomen Tenderness] : non-tender [Abdomen Soft] : soft [Abdomen Mass (___ Cm)] : no abdominal mass palpated [Gait - Sufficient For Exercise Testing] : the gait was sufficient for exercise testing [Nail Clubbing] : no clubbing of the fingernails [Abnormal Walk] : normal gait [Petechial Hemorrhages (___cm)] : no petechial hemorrhages [Cyanosis, Localized] : no localized cyanosis [Skin Color & Pigmentation] : normal skin color and pigmentation [] : no rash [No Venous Stasis] : no venous stasis [Skin Lesions] : no skin lesions [No Xanthoma] : no  xanthoma was observed [No Skin Ulcers] : no skin ulcer [Oriented To Time, Place, And Person] : oriented to person, place, and time [Affect] : the affect was normal [No Anxiety] : not feeling anxious [Mood] : the mood was normal [FreeTextEntry1] : right lung crackles

## 2020-10-14 NOTE — PROCEDURE
[Normal] : 3. No pericardial effusion. [de-identified] : Nicole Petri-Schoener, ENOCCS [de-identified] : Dr. Franco Johnson (card) [de-identified] : 1.1 [de-identified] : shortness of breath [de-identified] : 0.9 [de-identified] : 1.8 [de-identified] : 3.7 [de-identified] : 3.0 [de-identified] : 3.3 [de-identified] : 33 mmHg [de-identified] : 60-89 [de-identified] : Mild concentric left ventricular hypertrophy. Age appropriate diastolic function [de-identified] : The left atrium is normal in size. The left atrial volume index is 23 ml/m2. [de-identified] : There is minimal tricuspid regurgitation. [de-identified] : There is trace pulmonic insufficiency. [de-identified] : Mitral annular calcification [de-identified] : The inferior vena cava measures 1.2 cm. [FreeTextEntry1] : : 1945\par Age: 75 y\par Sex: M\par BP: 130/60\par Height: 152 cm\par Weight: 57 kg\par BSA: 1.5 m2\par

## 2020-10-14 NOTE — ED ADULT NURSE NOTE - FINAL NURSING ELECTRONIC SIGNATURE
[FreeTextEntry1] : 65 year old male with PMH of HTN, HLD, DM and thyroid nodule presents for evaluation of Chest discomfort. Patient reports substernal chest discomfort for 3-4 yrs. It occurs on cloudy days usually associated with dizziness, fatigue, and SOB lasting hours. Pt sometimes takes Chinese medicine (Suxiaojiuxinwan) which makes it better. Moving around also makes him feel better.  Patient denies SOB with exertion. Pt also reports resting palpitations, which usually occurs @9 -10 am after getting up , lasting 1/2 - 1 hour. Pt also c/o near syncope X 2 episodes a couple of years ago, both was during urination, lasted about 10 seconds, and he felt he couldn't control all his extremities. Patient had allergic reaction to antibiotics for H Pylori. I advised patient to wear a Holter monitor. I advised patient to undergo an echocardiogram and a treadmill stress test. 
02-Jul-2020 19:46

## 2020-10-14 NOTE — ASSESSMENT
[FreeTextEntry1] : dm has gotten better will start jardiance for his DM and CAD decrease glipizide 2.5 mg once a day \par htn controlled oon guideline med therapy no further medications \par cad on GDMT \par claudication symptoms improved on meds\par fu in three months \par \par \par

## 2020-11-03 ENCOUNTER — NON-APPOINTMENT (OUTPATIENT)
Age: 75
End: 2020-11-03

## 2020-11-25 ENCOUNTER — APPOINTMENT (OUTPATIENT)
Dept: PULMONOLOGY | Facility: CLINIC | Age: 75
End: 2020-11-25
Payer: COMMERCIAL

## 2020-11-25 VITALS
SYSTOLIC BLOOD PRESSURE: 110 MMHG | WEIGHT: 130 LBS | HEART RATE: 93 BPM | BODY MASS INDEX: 21.66 KG/M2 | DIASTOLIC BLOOD PRESSURE: 70 MMHG | HEIGHT: 65 IN | TEMPERATURE: 96.2 F | OXYGEN SATURATION: 95 %

## 2020-11-25 PROCEDURE — 99213 OFFICE O/P EST LOW 20 MIN: CPT | Mod: GC

## 2020-11-28 NOTE — ASSESSMENT
[FreeTextEntry1] : Data reviewed:\par \par CTA chest H 7/2020: lots of motion artifact but appearance of ground glass and some reticulation\par CXR 7/2020 [facility] is markedly improved compared to 4/2020\par PA/lat CXR in office 8/20/20: no sig change\par \par Impression:\par Post-Covid fibrotic lung disease\par \par Plan:\par He is s/p prolonged steroid taper finished in Oct 2020. Now able to walk with out desaturation at a slower pace. Exam uncganged otherwise. Last imaging was done in July 2020. Would repeat a CT chest to see the extent and progression of fibrosis. If significant and showing progression, we may think of starting an anti fibrotic. if CT chest findings are stable or resolving with current slightly limited but reasonable functional status , would observe and have him follow up. \par influenza immunization received during  prior visit.  \par F/U after CT chest. \par --\par Attending Addendum\par \par Agree w above. Continues to feel the same. Returning to work. Will repeat a CT now to evaluate for progression of fibrosis post-Covid.\par

## 2020-11-28 NOTE — REVIEW OF SYSTEMS
[Cough] : cough [SOB on Exertion] : sob on exertion [Negative] : Psychiatric [Hemoptysis] : no hemoptysis [Sputum] : no sputum [Dyspnea] : no dyspnea [Pleuritic Pain] : no pleuritic pain [Wheezing] : no wheezing

## 2020-11-28 NOTE — HISTORY OF PRESENT ILLNESS
[Never] : never [TextBox_4] : 07/15/2020: Asked to evaluate patient by Dr Johnson for dyspnea. He has coronary disease. 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. 3 mos ago he had Covid - fever, myalgias, cough, no dyspnea, isolated at home, not admitted. Now Ab positive. Returned to work last month and now is dyspneic with walking. Not dyspneic at rest. Never had lung disease. Quit smoking in his 40s. Lives in [place], born in [facility].\par \par 8/6/20: Feels better than last visit. Not working but walking 30-60 min a day, w dyspnea. No other changes. Blood glucose is well controlled. Started him on 30mg of prednisone for the nonresolving infiltrates w exertional hypoxia.\par \par 8/20/20: Feels a little better on 30mg prednisone. Blood glucose is up. No other changes. Taking Nexium, no GI symptoms.\par \par 9/30/20 [Raymundo]: on Prednisone 10 mg /day now, no worsening of SOB / cough, leg swelling is better, DM -2 with elevated BG in 300-400, not on insulin. taking nexium. feels lethargic sometimes. \par \par 11/25/20 [Raymundo]: here for follow up. Had stopped steroids about 45 days ago, has been walking 3-5 k steps a day, not GAMBINO if he walks with slower pace but dyspenic on exertion,occasional cough with clear sputum if exposed to dust,  No chest pain / fever. He plans to go back to work in a week. many questions regarding prevention of another infection of COVID, need for prophylactic antibiotics.

## 2020-11-28 NOTE — PHYSICAL EXAM
[No Acute Distress] : no acute distress [Normal Oropharynx] : normal oropharynx [II] : Mallampati Class: II [Normal Appearance] : normal appearance [No Neck Mass] : no neck mass [Normal Rate/Rhythm] : normal rate/rhythm [Normal S1, S2] : normal s1, s2 [No Murmurs] : no murmurs [No Resp Distress] : no resp distress [TextBox_68] : fine inspiratory crackles bilateral base posteriorly( unchanged)

## 2020-12-08 ENCOUNTER — APPOINTMENT (OUTPATIENT)
Dept: INTERNAL MEDICINE | Facility: CLINIC | Age: 75
End: 2020-12-08

## 2020-12-23 ENCOUNTER — NON-APPOINTMENT (OUTPATIENT)
Age: 75
End: 2020-12-23

## 2020-12-23 ENCOUNTER — APPOINTMENT (OUTPATIENT)
Dept: HEART AND VASCULAR | Facility: CLINIC | Age: 75
End: 2020-12-23
Payer: SELF-PAY

## 2020-12-23 VITALS
HEIGHT: 65 IN | HEART RATE: 75 BPM | WEIGHT: 128 LBS | SYSTOLIC BLOOD PRESSURE: 130 MMHG | BODY MASS INDEX: 21.33 KG/M2 | TEMPERATURE: 98.1 F | OXYGEN SATURATION: 97 % | DIASTOLIC BLOOD PRESSURE: 68 MMHG

## 2020-12-23 PROCEDURE — 36415 COLL VENOUS BLD VENIPUNCTURE: CPT

## 2020-12-23 PROCEDURE — 99072 ADDL SUPL MATRL&STAF TM PHE: CPT

## 2020-12-23 PROCEDURE — 99214 OFFICE O/P EST MOD 30 MIN: CPT

## 2020-12-23 PROCEDURE — 93000 ELECTROCARDIOGRAM COMPLETE: CPT

## 2020-12-23 RX ORDER — ROSUVASTATIN CALCIUM 40 MG/1
40 TABLET, FILM COATED ORAL
Qty: 90 | Refills: 3 | Status: DISCONTINUED | COMMUNITY
Start: 2019-08-20 | End: 2020-12-23

## 2020-12-24 LAB
ALBUMIN SERPL ELPH-MCNC: 4.4 G/DL
ALP BLD-CCNC: 84 U/L
ALT SERPL-CCNC: 12 U/L
ANION GAP SERPL CALC-SCNC: 13 MMOL/L
AST SERPL-CCNC: 19 U/L
BASOPHILS # BLD AUTO: 0.13 K/UL
BASOPHILS NFR BLD AUTO: 1.3 %
BILIRUB SERPL-MCNC: 0.5 MG/DL
BUN SERPL-MCNC: 17 MG/DL
CALCIUM SERPL-MCNC: 10.2 MG/DL
CHLORIDE SERPL-SCNC: 104 MMOL/L
CHOLEST SERPL-MCNC: 167 MG/DL
CO2 SERPL-SCNC: 19 MMOL/L
CREAT SERPL-MCNC: 1.02 MG/DL
EOSINOPHIL # BLD AUTO: 1.13 K/UL
EOSINOPHIL NFR BLD AUTO: 11.4 %
ESTIMATED AVERAGE GLUCOSE: 203 MG/DL
GLUCOSE SERPL-MCNC: 191 MG/DL
HBA1C MFR BLD HPLC: 8.7 %
HCT VFR BLD CALC: 43.1 %
HDLC SERPL-MCNC: 38 MG/DL
HGB BLD-MCNC: 13.7 G/DL
IMM GRANULOCYTES NFR BLD AUTO: 0.2 %
LDLC SERPL CALC-MCNC: 101 MG/DL
LYMPHOCYTES # BLD AUTO: 3.15 K/UL
LYMPHOCYTES NFR BLD AUTO: 31.8 %
MAN DIFF?: NORMAL
MCHC RBC-ENTMCNC: 29 PG
MCHC RBC-ENTMCNC: 31.8 GM/DL
MCV RBC AUTO: 91.1 FL
MONOCYTES # BLD AUTO: 0.78 K/UL
MONOCYTES NFR BLD AUTO: 7.9 %
NEUTROPHILS # BLD AUTO: 4.71 K/UL
NEUTROPHILS NFR BLD AUTO: 47.4 %
NONHDLC SERPL-MCNC: 129 MG/DL
NT-PROBNP SERPL-MCNC: 31 PG/ML
PLATELET # BLD AUTO: 342 K/UL
POTASSIUM SERPL-SCNC: 5.1 MMOL/L
PROT SERPL-MCNC: 7.7 G/DL
RBC # BLD: 4.73 M/UL
RBC # FLD: 12.9 %
SODIUM SERPL-SCNC: 136 MMOL/L
TRIGL SERPL-MCNC: 141 MG/DL
WBC # FLD AUTO: 9.92 K/UL

## 2020-12-28 NOTE — HISTORY OF PRESENT ILLNESS
[FreeTextEntry1] : 75 m LIMA to LAD, stent to LCX and LAD HTN DM PAD calcified arteries on SYL/PVR GI bleed here for follow up had covid with lung fibrosis here for follow up having some exertional shortness of breath having myalgias with his statins\par \par \par ecg st rbbb

## 2020-12-28 NOTE — PROCEDURE
[Normal] : 3. No pericardial effusion. [de-identified] : Dr. Franco Johnson (card) [de-identified] : Nicole Petri-Schoener, ENOCCS [de-identified] : shortness of breath [de-identified] : 1.1 [de-identified] : 0.9 [de-identified] : 3.7 [de-identified] : 1.8 [de-identified] : 3.0 [de-identified] : 3.3 [de-identified] : 33 mmHg [de-identified] : 60-60 [de-identified] : Mild concentric left ventricular hypertrophy. Age appropriate diastolic function [de-identified] : The left atrium is normal in size. The left atrial volume index is 23 ml/m2. [de-identified] : There is trace pulmonic insufficiency. [de-identified] : There is minimal tricuspid regurgitation. [de-identified] : The inferior vena cava measures 1.2 cm. [de-identified] : Mitral annular calcification [FreeTextEntry1] : : 1945\par Age: 75 y\par Sex: M\par BP: 130/60\par Height: 152 cm\par Weight: 57 kg\par BSA: 1.5 m2\par

## 2020-12-28 NOTE — ASSESSMENT
[FreeTextEntry1] : dm increase jardiance and add trulicity\par htn controlled oon guideline med therapy no further medications \par cad on GDMT \par will check lipids may need pcsk9 as he stopped his statin and feels better\par soboe will do nuclear stress test on coreg\par fu after testing\par \par \par

## 2021-01-05 ENCOUNTER — APPOINTMENT (OUTPATIENT)
Dept: INTERNAL MEDICINE | Facility: CLINIC | Age: 76
End: 2021-01-05

## 2021-01-06 ENCOUNTER — APPOINTMENT (OUTPATIENT)
Dept: HEART AND VASCULAR | Facility: CLINIC | Age: 76
End: 2021-01-06

## 2021-01-14 ENCOUNTER — APPOINTMENT (OUTPATIENT)
Dept: HEART AND VASCULAR | Facility: CLINIC | Age: 76
End: 2021-01-14
Payer: COMMERCIAL

## 2021-01-14 VITALS — HEIGHT: 65 IN | BODY MASS INDEX: 21.33 KG/M2 | WEIGHT: 128 LBS

## 2021-01-14 PROCEDURE — 99072 ADDL SUPL MATRL&STAF TM PHE: CPT

## 2021-01-14 PROCEDURE — 93015 CV STRESS TEST SUPVJ I&R: CPT

## 2021-01-14 PROCEDURE — A9500: CPT

## 2021-01-14 PROCEDURE — ZZZZZ: CPT

## 2021-01-14 PROCEDURE — 78452 HT MUSCLE IMAGE SPECT MULT: CPT

## 2021-01-19 ENCOUNTER — RX RENEWAL (OUTPATIENT)
Age: 76
End: 2021-01-19

## 2021-01-27 ENCOUNTER — NON-APPOINTMENT (OUTPATIENT)
Age: 76
End: 2021-01-27

## 2021-02-09 ENCOUNTER — APPOINTMENT (OUTPATIENT)
Dept: INTERNAL MEDICINE | Facility: CLINIC | Age: 76
End: 2021-02-09
Payer: SELF-PAY

## 2021-02-09 PROCEDURE — PCNS1: CPT

## 2021-02-24 ENCOUNTER — APPOINTMENT (OUTPATIENT)
Dept: HEART AND VASCULAR | Facility: CLINIC | Age: 76
End: 2021-02-24
Payer: COMMERCIAL

## 2021-02-24 VITALS
DIASTOLIC BLOOD PRESSURE: 67 MMHG | TEMPERATURE: 97.9 F | BODY MASS INDEX: 20.66 KG/M2 | OXYGEN SATURATION: 97 % | WEIGHT: 123.99 LBS | SYSTOLIC BLOOD PRESSURE: 125 MMHG | HEART RATE: 77 BPM | HEIGHT: 65 IN

## 2021-02-24 PROCEDURE — 99072 ADDL SUPL MATRL&STAF TM PHE: CPT

## 2021-02-24 PROCEDURE — 99214 OFFICE O/P EST MOD 30 MIN: CPT

## 2021-02-24 RX ORDER — ESOMEPRAZOLE MAGNESIUM 20 MG/1
20 CAPSULE, DELAYED RELEASE ORAL
Qty: 30 | Refills: 1 | Status: DISCONTINUED | OUTPATIENT
Start: 2020-08-06 | End: 2021-02-24

## 2021-02-24 NOTE — ASSESSMENT
[FreeTextEntry1] : dm wont check hga1c tdoay did not start trulicity\par htn controlled oon guideline med therapy no further medications \par cad on GDMT needs to start repatha \par his soboe due to covid lung he has severely diminished exercise capacity \par i reached out to pulm in the past for pulm rehab\par fu in three months\par i demonstrated trulicity for him he will come back for instruction how to administer repatha\par \par \par \par

## 2021-02-24 NOTE — PROCEDURE
[Tc-99m, sestamibi-Cardiolite, to 40mCi, dose-Radionuclides-[employer] code--v.1-Active-Trade] : Tc-99m, sestamibi-Cardiolite, to 40mCi, dose-Radionuclides-[employer] code--v.1-Active-Trade [Normal] : 3. No pericardial effusion. [ECG Atrial Arrhythmias] : no arrhythmias [de-identified] : Given via the IV route.    1 Day Protocol. Rest/Stress. SPECT. Gated. [de-identified] : 10.4 mCi [de-identified] : Given via the IV route.  Injection time and Heart Rate [de-identified] : 28.7 [de-identified] : 118 [de-identified] : 160/56 [de-identified] : 4.7 METS; 81 [de-identified] : 69 [TWNoteComboBox1] : DAVID [TWNoteComboBox2] : Fabricio [TWNoteComboBox4] : dyspnea [TWNoteComboBox3] : 1 [TWNoteComboBox5] : dyspnea [de-identified] : Dr. Franco Johnson (card) [de-identified] : Nicole Petri-Schoener, ENOCCS [de-identified] : shortness of breath [de-identified] : 1.1 [de-identified] : 0.9 [de-identified] : 3.7 [de-identified] : 1.8 [de-identified] : 3.0 [de-identified] : 3.3 [de-identified] : 33 mmHg [de-identified] : 60-37 [de-identified] : Mild concentric left ventricular hypertrophy. Age appropriate diastolic function [de-identified] : The left atrium is normal in size. The left atrial volume index is 23 ml/m2. [FreeTextEntry1] : There is mild  posterior mitral annular calcification. There is minimal mitral regurgitation. There is no mitral stenosis. [de-identified] : There is trace pulmonic insufficiency. [de-identified] : There is minimal tricuspid regurgitation. [de-identified] : The inferior vena cava measures 1.2 cm. [de-identified] : Mitral annular calcification

## 2021-02-24 NOTE — IMPRESSION
[FreeTextEntry1] : Abnormal myocardial perfusion imaging. Small mild apical defect. Poor functional capacity. Normal EKG stress test.

## 2021-02-24 NOTE — PHYSICAL EXAM
[General Appearance - Well Developed] : well developed [Normal Appearance] : normal appearance [Well Groomed] : well groomed [General Appearance - Well Nourished] : well nourished [No Deformities] : no deformities [General Appearance - In No Acute Distress] : no acute distress [Normal Conjunctiva] : the conjunctiva exhibited no abnormalities [Eyelids - No Xanthelasma] : the eyelids demonstrated no xanthelasmas [Normal Oral Mucosa] : normal oral mucosa [No Oral Pallor] : no oral pallor [No Oral Cyanosis] : no oral cyanosis [Normal Jugular Venous A Waves Present] : normal jugular venous A waves present [Normal Jugular Venous V Waves Present] : normal jugular venous V waves present [No Jugular Venous Wise A Waves] : no jugular venous wise A waves [Respiration, Rhythm And Depth] : normal respiratory rhythm and effort [Exaggerated Use Of Accessory Muscles For Inspiration] : no accessory muscle use [Auscultation Breath Sounds / Voice Sounds] : lungs were clear to auscultation bilaterally [Heart Rate And Rhythm] : heart rate and rhythm were normal [Heart Sounds] : normal S1 and S2 [Murmurs] : no murmurs present [Abdomen Soft] : soft [Abdomen Tenderness] : non-tender [Abdomen Mass (___ Cm)] : no abdominal mass palpated [Abnormal Walk] : normal gait [Gait - Sufficient For Exercise Testing] : the gait was sufficient for exercise testing [Nail Clubbing] : no clubbing of the fingernails [Cyanosis, Localized] : no localized cyanosis [Petechial Hemorrhages (___cm)] : no petechial hemorrhages [Skin Color & Pigmentation] : normal skin color and pigmentation [] : no rash [No Venous Stasis] : no venous stasis [No Skin Ulcers] : no skin ulcer [Skin Lesions] : no skin lesions [No Xanthoma] : no  xanthoma was observed [Oriented To Time, Place, And Person] : oriented to person, place, and time [Affect] : the affect was normal [Mood] : the mood was normal [No Anxiety] : not feeling anxious [FreeTextEntry1] : right lung crackles

## 2021-02-24 NOTE — HISTORY OF PRESENT ILLNESS
[FreeTextEntry1] : 75 m LIMA to LAD, stent to LCX and LAD HTN DM PAD calcified arteries on SYL/PVR GI bleed here for follow up had covid with lung fibrosis here for follow up having some exertional shortness of breath having myalgias with his statins did not start trulicity or repatha he is having a hard time working \par \par \par ecg st rbbb

## 2021-03-03 ENCOUNTER — APPOINTMENT (OUTPATIENT)
Dept: HEART AND VASCULAR | Facility: CLINIC | Age: 76
End: 2021-03-03
Payer: COMMERCIAL

## 2021-03-03 VITALS
HEIGHT: 65 IN | SYSTOLIC BLOOD PRESSURE: 135 MMHG | TEMPERATURE: 98.1 F | DIASTOLIC BLOOD PRESSURE: 70 MMHG | OXYGEN SATURATION: 97 % | BODY MASS INDEX: 20.66 KG/M2 | WEIGHT: 123.99 LBS | HEART RATE: 89 BPM

## 2021-03-03 PROCEDURE — 99213 OFFICE O/P EST LOW 20 MIN: CPT

## 2021-03-03 PROCEDURE — 99072 ADDL SUPL MATRL&STAF TM PHE: CPT

## 2021-03-04 NOTE — ASSESSMENT
[FreeTextEntry1] : dm wont check hga1c tdoay did not start trulicity i gavehim trulicity shot we demonstrated how to administer repatha\par htn controlled oon guideline med therapy no further medications \par his soboe due to covid lung he has severely diminished exercise capacity \par i reached out to pulm in the past for pulm rehab\par fu in three months\par \par \par \par \par

## 2021-03-04 NOTE — PROCEDURE
[Tc-99m, sestamibi-Cardiolite, to 40mCi, dose-Radionuclides-[employer] code--v.1-Active-Trade] : Tc-99m, sestamibi-Cardiolite, to 40mCi, dose-Radionuclides-[employer] code--v.1-Active-Trade [Normal] : 3. No pericardial effusion. [ECG Atrial Arrhythmias] : no arrhythmias [de-identified] : Given via the IV route.    1 Day Protocol. Rest/Stress. SPECT. Gated. [de-identified] : 10.4 mCi [de-identified] : Given via the IV route.  Injection time and Heart Rate [de-identified] : 28.7 [de-identified] : 118 [de-identified] : 160/56 [de-identified] : 4.7 METS; 81 [de-identified] : 69 [TWNoteComboBox1] : DAVID [TWNoteComboBox2] : Fabricio [TWNoteComboBox4] : dyspnea [TWNoteComboBox3] : 1 [TWNoteComboBox5] : dyspnea [de-identified] : Dr. Franco Johnson (card) [de-identified] : Nicole Petri-Schoener, ENOCCS [de-identified] : shortness of breath [de-identified] : 1.1 [de-identified] : 0.9 [de-identified] : 3.7 [de-identified] : 1.8 [de-identified] : 3.0 [de-identified] : 3.3 [de-identified] : 33 mmHg [de-identified] : 60-41 [de-identified] : Mild concentric left ventricular hypertrophy. Age appropriate diastolic function [de-identified] : The left atrium is normal in size. The left atrial volume index is 23 ml/m2. [FreeTextEntry1] : There is mild  posterior mitral annular calcification. There is minimal mitral regurgitation. There is no mitral stenosis. [de-identified] : There is trace pulmonic insufficiency. [de-identified] : There is minimal tricuspid regurgitation. [de-identified] : The inferior vena cava measures 1.2 cm. [de-identified] : Mitral annular calcification

## 2021-03-04 NOTE — HISTORY OF PRESENT ILLNESS
[FreeTextEntry1] : 75 m LIMA to LAD, stent to LCX and LAD HTN DM PAD calcified arteries on SYL/PVR GI bleed here for follow up had covid with lung fibrosis here for follow up having some exertional shortness of breath having myalgias with his statins he is here for teaching on how to inject himself\par \par \par ecg st rbbb

## 2021-06-30 ENCOUNTER — NON-APPOINTMENT (OUTPATIENT)
Age: 76
End: 2021-06-30

## 2021-06-30 ENCOUNTER — APPOINTMENT (OUTPATIENT)
Dept: HEART AND VASCULAR | Facility: CLINIC | Age: 76
End: 2021-06-30
Payer: COMMERCIAL

## 2021-06-30 VITALS
OXYGEN SATURATION: 94 % | DIASTOLIC BLOOD PRESSURE: 65 MMHG | HEART RATE: 83 BPM | TEMPERATURE: 97.7 F | BODY MASS INDEX: 20.32 KG/M2 | SYSTOLIC BLOOD PRESSURE: 122 MMHG | WEIGHT: 121.98 LBS | HEIGHT: 65 IN

## 2021-06-30 PROCEDURE — 99214 OFFICE O/P EST MOD 30 MIN: CPT

## 2021-06-30 PROCEDURE — 36415 COLL VENOUS BLD VENIPUNCTURE: CPT

## 2021-06-30 PROCEDURE — 93000 ELECTROCARDIOGRAM COMPLETE: CPT

## 2021-06-30 PROCEDURE — 99072 ADDL SUPL MATRL&STAF TM PHE: CPT

## 2021-06-30 RX ORDER — EVOLOCUMAB 420 MG/3.5
420 KIT SUBCUTANEOUS
Qty: 1 | Refills: 12 | Status: DISCONTINUED | COMMUNITY
Start: 2020-12-24 | End: 2021-06-30

## 2021-06-30 RX ORDER — DULAGLUTIDE 0.75 MG/.5ML
0.75 INJECTION, SOLUTION SUBCUTANEOUS
Qty: 4 | Refills: 5 | Status: DISCONTINUED | COMMUNITY
Start: 2020-12-23 | End: 2021-06-30

## 2021-06-30 NOTE — ASSESSMENT
[FreeTextEntry1] : Dm stop glyburide start trulicity again \par htn controlled on guideline med therapy no further medications \par his soboe due to covid lung he has severely diminished exercise capacity \par i reached out to pulm in the past for pulm rehab\par restart rosuvastatin 10 mg once a day\par fu in three months \par \par \par \par \par

## 2021-06-30 NOTE — HISTORY OF PRESENT ILLNESS
[FreeTextEntry1] : 76 m LIMA to LAD, stent to LCX and LAD HTN DM PAD calcified arteries on SYL/PVR GI bleed here for follow up had covid with lung fibrosis here for follow up having some exertional shortness of breath having myalgias with his statins stopped his trulicity repatha due to fatigue still glyburide \par \par ecg st rbbb

## 2021-06-30 NOTE — OBJECTIVE
Wound Surgery Consult Note:    HPI:  89yr old female w/ PMH: Colon CA, HTN, Anemia, Hypothyroidism, LLE lymphedema (monitored by PCP), GERD, CKD (followed by Neph- note in chart) and Osteoporosis. Pt w/ recently found colon lesion. Underwent a colon resection for carcinoma in the ; in December she developed black stools and anemia and underwent a virtual colonoscopy which revealed a 4cm lesion of the transverse colon. Now presents to PST for an ERAS Open Left Colectomy on 20. Denies N&V, abdominal pain, fevers, chills, chest pain or SOB and feels well otherwise. Pt to see Cards next week for evaluation. As per pt, to be admitted day prior (on 20) for bowel prep and hydration. (2020 14:31)    Ms. Morris was seen in the PACU after her procedure. She was seen with Dr. Satnos to whom she is known for management of a Left elbow pressure ulcer which has since healed. Her clinical nurse reports that she incurred a skin tear on her Left forearm associated with an IV infiltration yesterday. The Left arm swelling is greatly reduced today per this clinical nurse who cared for her yesterday and today.    PAST MEDICAL & SURGICAL HISTORY:  CKD (chronic kidney disease)  Malignant neoplasm of colon, unspecified  Osteoporosis  Hypothyroidism  Hypertension  Anemia  Fall: 10/2016 - outside of ED - tripped on curb - multiple lacerations left forearm, left rib pain  Tinnitus  Peripheral neuropathy: left leg, left foot  Hiatal hernia  Migraine: past history  Raynauds phenomenon  Colon cancer:  - s/p hemicolectomy  Cancer:  - behind left psoas muscle - s/p excision, chemo, radiation  Essential tremor  Hypothyroid  Lymphedema of left leg  GERD (gastroesophageal reflux disease)  Bilateral cataracts: ~6 yrs ago  History of cholecystectomy: Open-   History of abdominal surgery:  - excision of malignancy behind left psoas muscle with lymph node excision  Sarcoma of upper extremity, left: s/p excision  H/O varicose vein stripping  H/O exploratory laparotomy:   History of colon resection:   History of orthopedic surgery: right leg  H/O abdominal hysterectomy: NANCY       REVIEW OF SYSTEMS  General: no fever or chills	  ENMT:	cataract surgery  Respiratory and Thorax: no SOB  Cardiovascular:	no CP  Gastrointestinal:	 black stool, no abd pain or n/v  Genitourinary:	no dysuria  Musculoskeletal:	 ambulatory  Neurological:	no LOC  Vascular: varicose vein stripping  Skin: Left elbow stage 4 pressure ulcer which has healed	    MEDICATIONS  (STANDING):  cefoTEtan  IVPB 2 Gram(s) IV Intermittent once  heparin  Injectable 5000 Unit(s) SubCutaneous every 12 hours  hydromorphone (10 MICROgram(s)/mL) + bupivacaine 0.0625% in 0.9% Sodium Chloride PCEA 250 milliLiter(s) Epidural PCA Continuous  sodium bicarbonate  Infusion 0.119 mEq/kG/Hr (65 mL/Hr) IV Continuous <Continuous>    MEDICATIONS  (PRN):  hydromorphone (10 MICROgram(s)/mL) + bupivacaine 0.0625% in 0.9% Sodium Chloride PCEA Rescue Clinician  Bolus 4 milliLiter(s) Epidural every 15 minutes PRN for Pain Score greater than 6  naloxone Injectable 0.1 milliGRAM(s) IV Push every 3 minutes PRN For ANY of the following changes in patient status:  A. RR LESS THAN 10 breaths per minute, B. Oxygen saturation LESS THAN 90%, C. Sedation score of 6  ondansetron Injectable 4 milliGRAM(s) IV Push every 6 hours PRN Nausea  ondansetron Injectable 4 milliGRAM(s) IV Push every 6 hours PRN Nausea    Allergies    Compazine (Dystonic RXN)  erythromycin (Other)    Intolerances    amoxicillin (Diarrhea)  Augmentin (Stomach Upset)    SOCIAL HISTORY:  ; Former smoker, Denies ETOH, drugs    FAMILY HISTORY: not contributory    Vital Signs Last 24 Hrs  T(C): 36.3 (2020 10:29), Max: 36.5 (2020 20:00)  T(F): 97.4 (2020 10:29), Max: 97.7 (2020 20:00)  HR: 78 (2020 10:29) (73 - 103)  BP: 114/62 (2020 10:29) (101/53 - 156/64)  BP(mean): 71 (2020 10:00) (71 - 94)  RR: 18 (2020 10:29) (14 - 18)  SpO2: 98% (2020 10:29) (95% - 100%)    Physical Exam:  General: NAD, thin, frail  Respiratory: no SOB on room air  Gastrointestinal: abd incision with dressing CDI  Neurology: A&O x3  Musculoskeletal: no contractures or deformities  Vascular: BLE edema equal, BLE equally warm, no acute ischemia noted  Skin:  Left forearm wound - 5cm x W 2cm x D negligible, skin flap covering 80% of wound area, small amount of serosanguinous drainage, small amount of edema remains, No odor, erythema, increased warmth, tenderness, induration, fluctuance    LABS:      144  |  108  |  47<H>  ----------------------------<  137<H>  4.5   |  19<L>  |  2.24<H>    Ca    8.0<L>      2020 03:05  Phos  5.1       Mg     2.1                                 10.3   12.27 )-----------( 191      ( 2020 03:05 )             30.9     PT/INR - ( 2020 18:52 )   PT: 10.7 sec;   INR: 0.93 ratio         PTT - ( 2020 18:52 )  PTT:27.3 sec  Urinalysis Basic - ( 2020 03:05 )    Color: Light Yellow / Appearance: Clear / S.019 / pH: x  Gluc: x / Ketone: Negative  / Bili: Negative / Urobili: Negative   Blood: x / Protein: 30 mg/dL / Nitrite: Negative   Leuk Esterase: Negative / RBC: 27 /hpf / WBC 3 /HPF   Sq Epi: x / Non Sq Epi: 4 /hpf / Bacteria: Negative [History reviewed] : History reviewed. [Medications and Allergies reviewed] : Medications and allergies reviewed.

## 2021-06-30 NOTE — PHYSICAL EXAM
[Well Developed] : well developed [Well Nourished] : well nourished [No Acute Distress] : no acute distress [Normal Venous Pressure] : normal venous pressure [No Carotid Bruit] : no carotid bruit [Normal S1, S2] : normal S1, S2 [No Murmur] : no murmur [No Rub] : no rub [No Gallop] : no gallop [Clear Lung Fields] : clear lung fields [Good Air Entry] : good air entry [No Respiratory Distress] : no respiratory distress  [Soft] : abdomen soft [Non Tender] : non-tender [No Masses/organomegaly] : no masses/organomegaly [Normal Bowel Sounds] : normal bowel sounds [Normal Gait] : normal gait [No Edema] : no edema [No Cyanosis] : no cyanosis [No Clubbing] : no clubbing [No Varicosities] : no varicosities [No Rash] : no rash [No Skin Lesions] : no skin lesions [Moves all extremities] : moves all extremities [No Focal Deficits] : no focal deficits [Normal Speech] : normal speech [Alert and Oriented] : alert and oriented [Normal memory] : normal memory [General Appearance - Well Developed] : well developed [Normal Appearance] : normal appearance [Well Groomed] : well groomed [General Appearance - Well Nourished] : well nourished [No Deformities] : no deformities [General Appearance - In No Acute Distress] : no acute distress [Normal Conjunctiva] : the conjunctiva exhibited no abnormalities [Eyelids - No Xanthelasma] : the eyelids demonstrated no xanthelasmas [Normal Oral Mucosa] : normal oral mucosa [No Oral Pallor] : no oral pallor [No Oral Cyanosis] : no oral cyanosis [Normal Jugular Venous A Waves Present] : normal jugular venous A waves present [Normal Jugular Venous V Waves Present] : normal jugular venous V waves present [No Jugular Venous Wise A Waves] : no jugular venous wise A waves [Exaggerated Use Of Accessory Muscles For Inspiration] : no accessory muscle use [Respiration, Rhythm And Depth] : normal respiratory rhythm and effort [Auscultation Breath Sounds / Voice Sounds] : lungs were clear to auscultation bilaterally [Heart Rate And Rhythm] : heart rate and rhythm were normal [Heart Sounds] : normal S1 and S2 [Murmurs] : no murmurs present [Abdomen Soft] : soft [Abdomen Tenderness] : non-tender [Abnormal Walk] : normal gait [Abdomen Mass (___ Cm)] : no abdominal mass palpated [Gait - Sufficient For Exercise Testing] : the gait was sufficient for exercise testing [Nail Clubbing] : no clubbing of the fingernails [Cyanosis, Localized] : no localized cyanosis [Petechial Hemorrhages (___cm)] : no petechial hemorrhages [Skin Color & Pigmentation] : normal skin color and pigmentation [] : no rash [No Venous Stasis] : no venous stasis [Skin Lesions] : no skin lesions [No Skin Ulcers] : no skin ulcer [No Xanthoma] : no  xanthoma was observed [Oriented To Time, Place, And Person] : oriented to person, place, and time [Affect] : the affect was normal [Mood] : the mood was normal [No Anxiety] : not feeling anxious [FreeTextEntry1] : right lung crackles

## 2021-06-30 NOTE — PROCEDURE
[Tc-99m, sestamibi-Cardiolite, to 40mCi, dose-Radionuclides-[employer] code--v.1-Active-Trade] : Tc-99m, sestamibi-Cardiolite, to 40mCi, dose-Radionuclides-[employer] code--v.1-Active-Trade [Normal] : 3. No pericardial effusion. [ECG Atrial Arrhythmias] : no arrhythmias [de-identified] : Given via the IV route.    1 Day Protocol. Rest/Stress. SPECT. Gated. [de-identified] : 10.4 mCi [de-identified] : Given via the IV route.  Injection time and Heart Rate [de-identified] : 28.7 [de-identified] : 118 [de-identified] : 160/56 [de-identified] : 4.7 METS; 81 [de-identified] : 69 [TWNoteComboBox1] : DAVID [TWNoteComboBox2] : Fabricio [TWNoteComboBox4] : dyspnea [TWNoteComboBox3] : 1 [TWNoteComboBox5] : dyspnea [de-identified] : Dr. Franco Johnson (card) [de-identified] : Nicole Petri-Schoener, ENOCCS [de-identified] : shortness of breath [de-identified] : 1.1 [de-identified] : 0.9 [de-identified] : 3.7 [de-identified] : 1.8 [de-identified] : 3.0 [de-identified] : 3.3 [de-identified] : 33 mmHg [de-identified] : 60-51 [de-identified] : Mild concentric left ventricular hypertrophy. Age appropriate diastolic function [de-identified] : The left atrium is normal in size. The left atrial volume index is 23 ml/m2. [FreeTextEntry1] : There is mild  posterior mitral annular calcification. There is minimal mitral regurgitation. There is no mitral stenosis. [de-identified] : There is trace pulmonic insufficiency. [de-identified] : There is minimal tricuspid regurgitation. [de-identified] : The inferior vena cava measures 1.2 cm. [de-identified] : Mitral annular calcification

## 2021-07-01 LAB
ALBUMIN SERPL ELPH-MCNC: 4.2 G/DL
ALP BLD-CCNC: 93 U/L
ALT SERPL-CCNC: 15 U/L
ANION GAP SERPL CALC-SCNC: 13 MMOL/L
AST SERPL-CCNC: 22 U/L
BASOPHILS # BLD AUTO: 0.12 K/UL
BASOPHILS NFR BLD AUTO: 1.2 %
BILIRUB SERPL-MCNC: 0.9 MG/DL
BUN SERPL-MCNC: 16 MG/DL
CALCIUM SERPL-MCNC: 10 MG/DL
CHLORIDE SERPL-SCNC: 103 MMOL/L
CHOLEST SERPL-MCNC: 166 MG/DL
CO2 SERPL-SCNC: 21 MMOL/L
CREAT SERPL-MCNC: 1.01 MG/DL
EOSINOPHIL # BLD AUTO: 1.21 K/UL
EOSINOPHIL NFR BLD AUTO: 11.6 %
ESTIMATED AVERAGE GLUCOSE: 154 MG/DL
GLUCOSE SERPL-MCNC: 204 MG/DL
HBA1C MFR BLD HPLC: 7 %
HCT VFR BLD CALC: 46.8 %
HDLC SERPL-MCNC: 39 MG/DL
HGB BLD-MCNC: 14.6 G/DL
IMM GRANULOCYTES NFR BLD AUTO: 0.3 %
LDLC SERPL CALC-MCNC: 89 MG/DL
LYMPHOCYTES # BLD AUTO: 2.92 K/UL
LYMPHOCYTES NFR BLD AUTO: 28 %
MAN DIFF?: NORMAL
MCHC RBC-ENTMCNC: 28.7 PG
MCHC RBC-ENTMCNC: 31.2 GM/DL
MCV RBC AUTO: 92.1 FL
MONOCYTES # BLD AUTO: 0.95 K/UL
MONOCYTES NFR BLD AUTO: 9.1 %
NEUTROPHILS # BLD AUTO: 5.2 K/UL
NEUTROPHILS NFR BLD AUTO: 49.8 %
NONHDLC SERPL-MCNC: 127 MG/DL
NT-PROBNP SERPL-MCNC: 82 PG/ML
PLATELET # BLD AUTO: 323 K/UL
POTASSIUM SERPL-SCNC: 5.4 MMOL/L
PROT SERPL-MCNC: 8 G/DL
RBC # BLD: 5.08 M/UL
RBC # FLD: 15 %
SODIUM SERPL-SCNC: 136 MMOL/L
TRIGL SERPL-MCNC: 189 MG/DL
WBC # FLD AUTO: 10.43 K/UL

## 2021-10-01 ENCOUNTER — APPOINTMENT (OUTPATIENT)
Dept: PULMONOLOGY | Facility: CLINIC | Age: 76
End: 2021-10-01
Payer: COMMERCIAL

## 2021-10-01 VITALS
BODY MASS INDEX: 20.16 KG/M2 | HEIGHT: 65 IN | TEMPERATURE: 98 F | DIASTOLIC BLOOD PRESSURE: 70 MMHG | HEART RATE: 85 BPM | SYSTOLIC BLOOD PRESSURE: 100 MMHG | WEIGHT: 121 LBS | OXYGEN SATURATION: 96 %

## 2021-10-01 PROCEDURE — 71046 X-RAY EXAM CHEST 2 VIEWS: CPT

## 2021-10-01 PROCEDURE — 99214 OFFICE O/P EST MOD 30 MIN: CPT | Mod: 25

## 2021-10-13 ENCOUNTER — RESULT REVIEW (OUTPATIENT)
Age: 76
End: 2021-10-13

## 2021-10-13 ENCOUNTER — APPOINTMENT (OUTPATIENT)
Dept: CT IMAGING | Facility: HOSPITAL | Age: 76
End: 2021-10-13

## 2021-10-13 ENCOUNTER — OUTPATIENT (OUTPATIENT)
Dept: OUTPATIENT SERVICES | Facility: HOSPITAL | Age: 76
LOS: 1 days | End: 2021-10-13
Payer: COMMERCIAL

## 2021-10-13 DIAGNOSIS — Z95.1 PRESENCE OF AORTOCORONARY BYPASS GRAFT: Chronic | ICD-10-CM

## 2021-10-13 PROCEDURE — 71250 CT THORAX DX C-: CPT

## 2021-10-13 PROCEDURE — 71250 CT THORAX DX C-: CPT | Mod: 26

## 2021-10-15 NOTE — PHYSICAL EXAM
[No Acute Distress] : no acute distress [Normal Rate/Rhythm] : normal rate/rhythm [Normal S1, S2] : normal s1, s2 [No Murmurs] : no murmurs [TextBox_68] : crackles

## 2021-10-15 NOTE — ASSESSMENT
[FreeTextEntry1] : Data reviewed:\par \par CTA chest [facility] 7/2020: lots of motion artifact but appearance of ground glass and some reticulation\par PA/lat CXR in office 10/1/21: no improvement from 8/2020, if anything the reticular markings are more pronounced\par \par Impression:\par Post-Covid fibrotic lung disease\par \par Plan:\par He appears to have a significant amount of fixed lung disease post Covid. I will get a new CT to reevaluate in more detail and will try to get him to an in person rehab closer to [place] (has been doing online rehab w [facility] but this isn't working well for him). \par --\par CT 10/13/21 [facility] personally reviewed :\par 1. Since 7/2/2020, there has been progression of basilar predominant fibrotic interstitial lung disease. There is abnormal reticulation, groundglass opacity and traction bronchiectasis/bronchiolectasis, in addition to small areas of peripheral consolidation and air trapping. The CT features are consistent with an alternative diagnosis to UIP. 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 The differential diagnosis includes organizing pneumonia associated with prior COVID 19 infection and fibrotic NSIP.\par 2. No change mild mediastinal lymphadenopathy.\par \par Will have him come in for PFT to follow over time w question should we start an antifibrotic. Spoke to him.\par  \par

## 2021-10-28 ENCOUNTER — APPOINTMENT (OUTPATIENT)
Dept: HEART AND VASCULAR | Facility: CLINIC | Age: 76
End: 2021-10-28
Payer: COMMERCIAL

## 2021-10-28 VITALS
SYSTOLIC BLOOD PRESSURE: 116 MMHG | TEMPERATURE: 96.8 F | BODY MASS INDEX: 20.16 KG/M2 | HEIGHT: 65 IN | DIASTOLIC BLOOD PRESSURE: 61 MMHG | HEART RATE: 87 BPM | OXYGEN SATURATION: 96 % | WEIGHT: 120.99 LBS

## 2021-10-28 PROCEDURE — 36415 COLL VENOUS BLD VENIPUNCTURE: CPT

## 2021-10-28 PROCEDURE — 99214 OFFICE O/P EST MOD 30 MIN: CPT

## 2021-10-28 RX ORDER — DULAGLUTIDE 0.75 MG/.5ML
0.75 INJECTION, SOLUTION SUBCUTANEOUS
Qty: 4 | Refills: 10 | Status: DISCONTINUED | COMMUNITY
Start: 2021-06-30 | End: 2021-10-28

## 2021-10-28 NOTE — REASON FOR VISIT
[Follow-Up - Clinic] : a clinic follow-up of [Coronary Artery Disease] : coronary artery disease [FreeTextEntry2] : fatigue

## 2021-10-28 NOTE — PHYSICAL EXAM
[Well Developed] : well developed [Well Nourished] : well nourished [No Acute Distress] : no acute distress [Normal Venous Pressure] : normal venous pressure [No Carotid Bruit] : no carotid bruit [Normal S1, S2] : normal S1, S2 [No Murmur] : no murmur [No Rub] : no rub [No Gallop] : no gallop [Clear Lung Fields] : clear lung fields [Good Air Entry] : good air entry [No Respiratory Distress] : no respiratory distress  [Soft] : abdomen soft [Non Tender] : non-tender [No Masses/organomegaly] : no masses/organomegaly [Normal Bowel Sounds] : normal bowel sounds [Normal Gait] : normal gait [No Edema] : no edema [No Cyanosis] : no cyanosis [No Clubbing] : no clubbing [No Varicosities] : no varicosities [No Rash] : no rash [No Skin Lesions] : no skin lesions [Moves all extremities] : moves all extremities [No Focal Deficits] : no focal deficits [Normal Speech] : normal speech [Alert and Oriented] : alert and oriented [Normal memory] : normal memory [General Appearance - Well Developed] : well developed [Normal Appearance] : normal appearance [Well Groomed] : well groomed [General Appearance - Well Nourished] : well nourished [No Deformities] : no deformities [General Appearance - In No Acute Distress] : no acute distress [Normal Conjunctiva] : the conjunctiva exhibited no abnormalities [Eyelids - No Xanthelasma] : the eyelids demonstrated no xanthelasmas [Normal Oral Mucosa] : normal oral mucosa [No Oral Pallor] : no oral pallor [No Oral Cyanosis] : no oral cyanosis [Normal Jugular Venous A Waves Present] : normal jugular venous A waves present [Normal Jugular Venous V Waves Present] : normal jugular venous V waves present [No Jugular Venous Wise A Waves] : no jugular venous wise A waves [Respiration, Rhythm And Depth] : normal respiratory rhythm and effort [Exaggerated Use Of Accessory Muscles For Inspiration] : no accessory muscle use [Auscultation Breath Sounds / Voice Sounds] : lungs were clear to auscultation bilaterally [Heart Rate And Rhythm] : heart rate and rhythm were normal [Heart Sounds] : normal S1 and S2 [Murmurs] : no murmurs present [Abdomen Soft] : soft [Abdomen Tenderness] : non-tender [Abdomen Mass (___ Cm)] : no abdominal mass palpated [Abnormal Walk] : normal gait [Gait - Sufficient For Exercise Testing] : the gait was sufficient for exercise testing [Nail Clubbing] : no clubbing of the fingernails [Cyanosis, Localized] : no localized cyanosis [Petechial Hemorrhages (___cm)] : no petechial hemorrhages [Skin Color & Pigmentation] : normal skin color and pigmentation [] : no rash [No Venous Stasis] : no venous stasis [Skin Lesions] : no skin lesions [No Skin Ulcers] : no skin ulcer [No Xanthoma] : no  xanthoma was observed [Oriented To Time, Place, And Person] : oriented to person, place, and time [Affect] : the affect was normal [Mood] : the mood was normal [No Anxiety] : not feeling anxious [FreeTextEntry1] : right lung crackles

## 2021-10-28 NOTE — HISTORY OF PRESENT ILLNESS
[FreeTextEntry1] : 76 m LIMA to LAD, stent to LCX and LAD HTN DM PAD calcified arteries on SYL/PVR GI bleed here for follow up had covid with lung fibrosis here for follow up having some exertional shortness of breath having myalgias with his statins stopped his trulicity repatha due to fatigue resumed low dose statin and feels ok \par \par ecg st rbbb

## 2021-10-28 NOTE — ASSESSMENT
[FreeTextEntry1] : Dm he does not want to take trulicity\par htn controlled on guideline med therapy no further medications \par his soboe due to covid lung he has severely diminished exercise capacity \par check lipid panel\par dimished pulses will do lisa/pvr and us\par with his sob repeat echo for pasp\par \par fu in three months \par \par \par \par \par

## 2021-10-28 NOTE — PROCEDURE
[Tc-99m, sestamibi-Cardiolite, to 40mCi, dose-Radionuclides-[employer] code--v.1-Active-Trade] : Tc-99m, sestamibi-Cardiolite, to 40mCi, dose-Radionuclides-[employer] code--v.1-Active-Trade [Normal] : 3. No pericardial effusion. [ECG Atrial Arrhythmias] : no arrhythmias [de-identified] : Given via the IV route.    1 Day Protocol. Rest/Stress. SPECT. Gated. [de-identified] : 10.4 mCi [de-identified] : Given via the IV route.  Injection time and Heart Rate [de-identified] : 28.7 [de-identified] : 118 [de-identified] : 160/56 [de-identified] : 4.7 METS; 81 [de-identified] : 69 [TWNoteComboBox1] : DAVID [TWNoteComboBox2] : Fabricio [TWNoteComboBox4] : dyspnea [TWNoteComboBox3] : 1 [TWNoteComboBox5] : dyspnea [de-identified] : Dr. Franco Johnson (card) [de-identified] : Nicole Petri-Schoener, ENOCCS [de-identified] : shortness of breath [de-identified] : 1.1 [de-identified] : 0.9 [de-identified] : 3.7 [de-identified] : 1.8 [de-identified] : 3.0 [de-identified] : 3.3 [de-identified] : 33 mmHg [de-identified] : 60-27 [de-identified] : Mild concentric left ventricular hypertrophy. Age appropriate diastolic function [de-identified] : The left atrium is normal in size. The left atrial volume index is 23 ml/m2. [FreeTextEntry1] : There is mild  posterior mitral annular calcification. There is minimal mitral regurgitation. There is no mitral stenosis. [de-identified] : There is trace pulmonic insufficiency. [de-identified] : There is minimal tricuspid regurgitation. [de-identified] : The inferior vena cava measures 1.2 cm. [de-identified] : Mitral annular calcification

## 2021-10-29 LAB
ALBUMIN SERPL ELPH-MCNC: 4.2 G/DL
ALP BLD-CCNC: 93 U/L
ALT SERPL-CCNC: 13 U/L
ANION GAP SERPL CALC-SCNC: 12 MMOL/L
AST SERPL-CCNC: 21 U/L
BASOPHILS # BLD AUTO: 0.12 K/UL
BASOPHILS NFR BLD AUTO: 1.1 %
BILIRUB SERPL-MCNC: 0.8 MG/DL
BUN SERPL-MCNC: 21 MG/DL
CALCIUM SERPL-MCNC: 9.8 MG/DL
CHLORIDE SERPL-SCNC: 106 MMOL/L
CHOLEST SERPL-MCNC: 87 MG/DL
CO2 SERPL-SCNC: 19 MMOL/L
CREAT SERPL-MCNC: 0.84 MG/DL
EOSINOPHIL # BLD AUTO: 1.2 K/UL
EOSINOPHIL NFR BLD AUTO: 10.6 %
ESTIMATED AVERAGE GLUCOSE: 160 MG/DL
GLUCOSE SERPL-MCNC: 125 MG/DL
HBA1C MFR BLD HPLC: 7.2 %
HCT VFR BLD CALC: 42.1 %
HDLC SERPL-MCNC: 37 MG/DL
HGB BLD-MCNC: 13.6 G/DL
IMM GRANULOCYTES NFR BLD AUTO: 0.2 %
LDLC SERPL CALC-MCNC: 35 MG/DL
LYMPHOCYTES # BLD AUTO: 2.64 K/UL
LYMPHOCYTES NFR BLD AUTO: 23.4 %
MAN DIFF?: NORMAL
MCHC RBC-ENTMCNC: 29 PG
MCHC RBC-ENTMCNC: 32.3 GM/DL
MCV RBC AUTO: 89.8 FL
MONOCYTES # BLD AUTO: 1.01 K/UL
MONOCYTES NFR BLD AUTO: 8.9 %
NEUTROPHILS # BLD AUTO: 6.31 K/UL
NEUTROPHILS NFR BLD AUTO: 55.8 %
NONHDLC SERPL-MCNC: 50 MG/DL
PLATELET # BLD AUTO: 326 K/UL
POTASSIUM SERPL-SCNC: 5.3 MMOL/L
PROT SERPL-MCNC: 8 G/DL
RBC # BLD: 4.69 M/UL
RBC # FLD: 14.2 %
SODIUM SERPL-SCNC: 137 MMOL/L
TRIGL SERPL-MCNC: 76 MG/DL
WBC # FLD AUTO: 11.3 K/UL

## 2021-11-03 ENCOUNTER — APPOINTMENT (OUTPATIENT)
Dept: HEART AND VASCULAR | Facility: CLINIC | Age: 76
End: 2021-11-03
Payer: COMMERCIAL

## 2021-11-03 PROCEDURE — 93924 LWR XTR VASC STDY BILAT: CPT

## 2021-11-03 PROCEDURE — 93925 LOWER EXTREMITY STUDY: CPT

## 2021-11-03 PROCEDURE — 93306 TTE W/DOPPLER COMPLETE: CPT

## 2021-11-16 ENCOUNTER — EMERGENCY (EMERGENCY)
Facility: HOSPITAL | Age: 76
LOS: 1 days | Discharge: HOME | End: 2021-11-16
Attending: EMERGENCY MEDICINE | Admitting: EMERGENCY MEDICINE
Payer: COMMERCIAL

## 2021-11-16 VITALS
SYSTOLIC BLOOD PRESSURE: 120 MMHG | DIASTOLIC BLOOD PRESSURE: 69 MMHG | TEMPERATURE: 98 F | RESPIRATION RATE: 16 BRPM | HEART RATE: 96 BPM | OXYGEN SATURATION: 96 %

## 2021-11-16 VITALS
HEART RATE: 76 BPM | WEIGHT: 136.69 LBS | DIASTOLIC BLOOD PRESSURE: 69 MMHG | TEMPERATURE: 98 F | HEIGHT: 68 IN | OXYGEN SATURATION: 96 % | SYSTOLIC BLOOD PRESSURE: 134 MMHG | RESPIRATION RATE: 18 BRPM

## 2021-11-16 DIAGNOSIS — Z79.84 LONG TERM (CURRENT) USE OF ORAL HYPOGLYCEMIC DRUGS: ICD-10-CM

## 2021-11-16 DIAGNOSIS — Z20.822 CONTACT WITH AND (SUSPECTED) EXPOSURE TO COVID-19: ICD-10-CM

## 2021-11-16 DIAGNOSIS — Z95.1 PRESENCE OF AORTOCORONARY BYPASS GRAFT: Chronic | ICD-10-CM

## 2021-11-16 DIAGNOSIS — Z95.1 PRESENCE OF AORTOCORONARY BYPASS GRAFT: ICD-10-CM

## 2021-11-16 DIAGNOSIS — E78.5 HYPERLIPIDEMIA, UNSPECIFIED: ICD-10-CM

## 2021-11-16 DIAGNOSIS — R10.13 EPIGASTRIC PAIN: ICD-10-CM

## 2021-11-16 DIAGNOSIS — I10 ESSENTIAL (PRIMARY) HYPERTENSION: ICD-10-CM

## 2021-11-16 DIAGNOSIS — E11.9 TYPE 2 DIABETES MELLITUS WITHOUT COMPLICATIONS: ICD-10-CM

## 2021-11-16 DIAGNOSIS — Z86.16 PERSONAL HISTORY OF COVID-19: ICD-10-CM

## 2021-11-16 DIAGNOSIS — K85.90 ACUTE PANCREATITIS WITHOUT NECROSIS OR INFECTION, UNSPECIFIED: ICD-10-CM

## 2021-11-16 LAB
ALBUMIN SERPL ELPH-MCNC: 4.2 G/DL — SIGNIFICANT CHANGE UP (ref 3.3–5)
ALP SERPL-CCNC: 109 U/L — SIGNIFICANT CHANGE UP (ref 40–120)
ALT FLD-CCNC: 13 U/L — SIGNIFICANT CHANGE UP (ref 10–45)
ANION GAP SERPL CALC-SCNC: 9 MMOL/L — SIGNIFICANT CHANGE UP (ref 5–17)
APTT BLD: 34.8 SEC — SIGNIFICANT CHANGE UP (ref 27.5–35.5)
AST SERPL-CCNC: 25 U/L — SIGNIFICANT CHANGE UP (ref 10–40)
BASOPHILS # BLD AUTO: 0.09 K/UL — SIGNIFICANT CHANGE UP (ref 0–0.2)
BASOPHILS NFR BLD AUTO: 0.7 % — SIGNIFICANT CHANGE UP (ref 0–2)
BILIRUB SERPL-MCNC: 0.8 MG/DL — SIGNIFICANT CHANGE UP (ref 0.2–1.2)
BUN SERPL-MCNC: 17 MG/DL — SIGNIFICANT CHANGE UP (ref 7–23)
CALCIUM SERPL-MCNC: 10.7 MG/DL — HIGH (ref 8.4–10.5)
CHLORIDE SERPL-SCNC: 99 MMOL/L — SIGNIFICANT CHANGE UP (ref 96–108)
CO2 SERPL-SCNC: 24 MMOL/L — SIGNIFICANT CHANGE UP (ref 22–31)
CREAT SERPL-MCNC: 0.85 MG/DL — SIGNIFICANT CHANGE UP (ref 0.5–1.3)
EOSINOPHIL # BLD AUTO: 0.62 K/UL — HIGH (ref 0–0.5)
EOSINOPHIL NFR BLD AUTO: 4.7 % — SIGNIFICANT CHANGE UP (ref 0–6)
GLUCOSE SERPL-MCNC: 214 MG/DL — HIGH (ref 70–99)
HCT VFR BLD CALC: 42.4 % — SIGNIFICANT CHANGE UP (ref 39–50)
HGB BLD-MCNC: 13.9 G/DL — SIGNIFICANT CHANGE UP (ref 13–17)
IMM GRANULOCYTES NFR BLD AUTO: 0.6 % — SIGNIFICANT CHANGE UP (ref 0–1.5)
INR BLD: 1.09 — SIGNIFICANT CHANGE UP (ref 0.88–1.16)
LIDOCAIN IGE QN: 536 U/L — HIGH (ref 7–60)
LYMPHOCYTES # BLD AUTO: 16.9 % — SIGNIFICANT CHANGE UP (ref 13–44)
LYMPHOCYTES # BLD AUTO: 2.24 K/UL — SIGNIFICANT CHANGE UP (ref 1–3.3)
MCHC RBC-ENTMCNC: 28.6 PG — SIGNIFICANT CHANGE UP (ref 27–34)
MCHC RBC-ENTMCNC: 32.8 GM/DL — SIGNIFICANT CHANGE UP (ref 32–36)
MCV RBC AUTO: 87.2 FL — SIGNIFICANT CHANGE UP (ref 80–100)
MONOCYTES # BLD AUTO: 1.09 K/UL — HIGH (ref 0–0.9)
MONOCYTES NFR BLD AUTO: 8.2 % — SIGNIFICANT CHANGE UP (ref 2–14)
NEUTROPHILS # BLD AUTO: 9.11 K/UL — HIGH (ref 1.8–7.4)
NEUTROPHILS NFR BLD AUTO: 68.9 % — SIGNIFICANT CHANGE UP (ref 43–77)
NRBC # BLD: 0 /100 WBCS — SIGNIFICANT CHANGE UP (ref 0–0)
PLATELET # BLD AUTO: 322 K/UL — SIGNIFICANT CHANGE UP (ref 150–400)
POTASSIUM SERPL-MCNC: 4.9 MMOL/L — SIGNIFICANT CHANGE UP (ref 3.5–5.3)
POTASSIUM SERPL-SCNC: 4.9 MMOL/L — SIGNIFICANT CHANGE UP (ref 3.5–5.3)
PROT SERPL-MCNC: 9.3 G/DL — HIGH (ref 6–8.3)
PROTHROM AB SERPL-ACNC: 13 SEC — SIGNIFICANT CHANGE UP (ref 10.6–13.6)
RBC # BLD: 4.86 M/UL — SIGNIFICANT CHANGE UP (ref 4.2–5.8)
RBC # FLD: 13.6 % — SIGNIFICANT CHANGE UP (ref 10.3–14.5)
SARS-COV-2 RNA SPEC QL NAA+PROBE: NEGATIVE — SIGNIFICANT CHANGE UP
SODIUM SERPL-SCNC: 132 MMOL/L — LOW (ref 135–145)
WBC # BLD: 13.23 K/UL — HIGH (ref 3.8–10.5)
WBC # FLD AUTO: 13.23 K/UL — HIGH (ref 3.8–10.5)

## 2021-11-16 PROCEDURE — 36415 COLL VENOUS BLD VENIPUNCTURE: CPT

## 2021-11-16 PROCEDURE — 99284 EMERGENCY DEPT VISIT MOD MDM: CPT | Mod: 25

## 2021-11-16 PROCEDURE — 71045 X-RAY EXAM CHEST 1 VIEW: CPT

## 2021-11-16 PROCEDURE — 85025 COMPLETE CBC W/AUTO DIFF WBC: CPT

## 2021-11-16 PROCEDURE — 85610 PROTHROMBIN TIME: CPT

## 2021-11-16 PROCEDURE — 84484 ASSAY OF TROPONIN QUANT: CPT

## 2021-11-16 PROCEDURE — 83615 LACTATE (LD) (LDH) ENZYME: CPT

## 2021-11-16 PROCEDURE — G1004: CPT

## 2021-11-16 PROCEDURE — 74177 CT ABD & PELVIS W/CONTRAST: CPT | Mod: 26,MG

## 2021-11-16 PROCEDURE — 74177 CT ABD & PELVIS W/CONTRAST: CPT | Mod: MG

## 2021-11-16 PROCEDURE — 83690 ASSAY OF LIPASE: CPT

## 2021-11-16 PROCEDURE — 93010 ELECTROCARDIOGRAM REPORT: CPT

## 2021-11-16 PROCEDURE — 80053 COMPREHEN METABOLIC PANEL: CPT

## 2021-11-16 PROCEDURE — 85730 THROMBOPLASTIN TIME PARTIAL: CPT

## 2021-11-16 PROCEDURE — 96374 THER/PROPH/DIAG INJ IV PUSH: CPT | Mod: XU

## 2021-11-16 PROCEDURE — 71045 X-RAY EXAM CHEST 1 VIEW: CPT | Mod: 26

## 2021-11-16 PROCEDURE — 87635 SARS-COV-2 COVID-19 AMP PRB: CPT

## 2021-11-16 PROCEDURE — 80061 LIPID PANEL: CPT

## 2021-11-16 PROCEDURE — 93005 ELECTROCARDIOGRAM TRACING: CPT

## 2021-11-16 PROCEDURE — 99285 EMERGENCY DEPT VISIT HI MDM: CPT

## 2021-11-16 RX ORDER — LIDOCAINE 4 G/100G
10 CREAM TOPICAL ONCE
Refills: 0 | Status: COMPLETED | OUTPATIENT
Start: 2021-11-16 | End: 2021-11-16

## 2021-11-16 RX ORDER — SODIUM CHLORIDE 9 MG/ML
1000 INJECTION INTRAMUSCULAR; INTRAVENOUS; SUBCUTANEOUS ONCE
Refills: 0 | Status: COMPLETED | OUTPATIENT
Start: 2021-11-16 | End: 2021-11-16

## 2021-11-16 RX ORDER — FAMOTIDINE 10 MG/ML
20 INJECTION INTRAVENOUS ONCE
Refills: 0 | Status: COMPLETED | OUTPATIENT
Start: 2021-11-16 | End: 2021-11-16

## 2021-11-16 RX ADMIN — FAMOTIDINE 20 MILLIGRAM(S): 10 INJECTION INTRAVENOUS at 17:22

## 2021-11-16 RX ADMIN — Medication 30 MILLILITER(S): at 17:22

## 2021-11-16 RX ADMIN — SODIUM CHLORIDE 1000 MILLILITER(S): 9 INJECTION INTRAMUSCULAR; INTRAVENOUS; SUBCUTANEOUS at 17:23

## 2021-11-16 NOTE — ED PROVIDER NOTE - ATTENDING CONTRIBUTION TO CARE
pt w/ PMHx HTN, HLD, CAGB p/w epigastric abd pain, onset last night, now radiating to the back. No CP, SOB. No n/v/d.   symmetric pulses, BPs pt w/ PMHx HTN, HLD, CAD s/p CABG, DM, p/w epigastric abd pain, onset last night, now radiating to the back. No CP, SOB. No n/v/d. No hx alcohol intake / pancreatitis. No numbness/tingling, weakness, or dizziness  Constitutional: Well appearing, awake, alert, oriented to person, place, time/situation and in no apparent distress. non toxic appearing  ENMT: Airway patent.   Eyes: Clear bilaterally  Cardiac: Normal rate, regular rhythm. symmetric BP's and pulses  Respiratory: No increased WOB, tachypnea, hypoxia, or accessory mm use. Pt speaks in full sentences.   Gastrointestinal: Abd soft, ND. + mild ttp in the epigastric region. No Aparicio's sign. No guarding, rebound, or rigidity. no pulsatile abd masses  Musculoskeletal: Range of motion is not limited  Neuro: Alert and oriented x 3, face symmetric and speech fluent. Nml gross motor movement, grossly non focal   Skin: Skin normal color for race, warm, dry and intact. No evidence of rash.  Psych: Alert and oriented to person, place, time/situation. normal mood and affect. no apparent risk to self or others.   Epigastric pain. DDx includes but not limited to gastritis, pancreatitis, biliary colic, less likely cholecystitis, other pathology. There are no EKG changes to suggest ischemia, infarction, or pericarditis. No CP, SOB. Elevated lipase, no prior hx, denies alcohol intake. Check TG, CT a/p. IVF. Declines analgesia. Dispo pending w/u and clinical status

## 2021-11-16 NOTE — ED PROVIDER NOTE - CARE PROVIDER_API CALL
Joshua Welch)  Gastroenterology  [address], [place] [address]  Phone: [phone]  Fax: [phone]  Follow Up Time:

## 2021-11-16 NOTE — ED ADULT NURSE NOTE - NSICDXPASTMEDICALHX_GEN_ALL_CORE_FT
PAST MEDICAL HISTORY:  CAD (coronary artery disease)     Essential hypertension HTN (hypertension)    H pylori ulcer     Type 2 diabetes mellitus Diabetes

## 2021-11-16 NOTE — ED PROVIDER NOTE - CHPI ED SYMPTOMS NEG
no chest pain, no shortness of breath/no dizziness/no fever/no nausea/no numbness/no tingling/no vomiting/no weakness/no chills

## 2021-11-16 NOTE — ED ADULT NURSE NOTE - NS PRO AD NO ADVANCE DIRECTIVE
From: Alessio Werner  To: Maisha Hardwick MD  Sent: 7/27/2017 2:43 PM CDT  Subject: Non-Urgent Medical Question    Doc Janna Barry,  I have some feedback for you. I used the Lotrimin 2x daily on my lips religiously for 2.5 weeks.  There is improvement but still not No Advancement Flap (Double) Text: The defect edges were debeveled with a #15 scalpel blade.  Given the location of the defect and the proximity to free margins a double advancement flap was deemed most appropriate.  Using a sterile surgical marker, the appropriate advancement flaps were drawn incorporating the defect and placing the expected incisions within the relaxed skin tension lines where possible.    The area thus outlined was incised deep to adipose tissue with a #15 scalpel blade.  The skin margins were undermined to an appropriate distance in all directions utilizing iris scissors.

## 2021-11-16 NOTE — ED ADULT NURSE NOTE - OBJECTIVE STATEMENT
Pt presenting with 1 day of diffuse abdominal pain radiating to back. Denies CP/n/v/d. Abdomen soft, non distended. Diffuse tenderness no guarding.

## 2021-11-16 NOTE — ED PROVIDER NOTE - PATIENT PORTAL LINK FT
You can access the FollowMyHealth Patient Portal offered by [facility] by registering at the following website: [email or web address]. By joining [employer]’s FollowMyHealth portal, you will also be able to view your health information using other applications (apps) compatible with our system.

## 2021-11-16 NOTE — ED ADULT TRIAGE NOTE - CHIEF COMPLAINT QUOTE
Pt presents to ED w/ c/o of abdominal pain x last night. Pt points to epigastric region, denies N/V/D, fever/chills.

## 2021-11-16 NOTE — ED PROVIDER NOTE - OBJECTIVE STATEMENT
The patient is a 77 yo male with a past medical history of HTN, HLD, DM, CABG requiring 3 stents, and interstitial lung disease s/p COVID-19 who presents complaining of epigastric abdominal pain which started last night around 10 pm. The patient describes the pain as gripping pain which radiates to his back and is rated a 7-8/10 in severity. The patient reports since the onset of his pain he has not had much of an appetite and he reports his last normal bowel movement was yesterday. Out of concern he presents to the ER for further evaluation. The patient denies fever, chills, shortness of breath, chest pain, nausea, vomiting, diarrhea, constipation, flank pain, dysuria, hematuria, blood in stool, dizziness, weakness, or  numbness.

## 2021-11-16 NOTE — ED PROVIDER NOTE - NSFOLLOWUPINSTRUCTIONS_ED_ALL_ED_FT
You were evaluated in the ED for upper abdominal pain. You have been diagnosed with pancreatitis. You had no vomiting, nor fever, no signs of complication on CT. You have tolerated oral intake in the ED.    Eat  clear liquid diet. Slowly advance to a low-fat solid diet when you have no pain. Drink plenty of water to keep yourself hydrated. See your primary doctor and / or a gastroenterologist within 1-2 weeks for follow up.     Return to the ED for worsening / severe pain, fever, vomiting, yellowish discoloration of the skin, or other concerning symptoms.       Pancreatitis    WHAT YOU NEED TO KNOW:    Pancreatitis is inflammation of your pancreas. The pancreas is an organ that makes insulin. It also makes enzymes (digestive juices) that help your body digest food. Pancreatitis may be an acute (short-term) problem that happens only once. It may become a chronic (long-term) problem that comes and goes over time.    Pancreas         DISCHARGE INSTRUCTIONS:    Call your doctor if:   •You have severe pain in your abdomen and you are vomiting.      •You have a fever.       •You continue to lose weight without trying.      •Your skin or the whites of your eyes turn yellow.      •You have questions or concerns about your condition or care.      Medicines: You may need any of the following:   •Prescription pain medicine may be given. Ask your healthcare provider how to take this medicine safely. Some prescription pain medicines contain acetaminophen. Do not take other medicines that contain acetaminophen without talking to your healthcare provider. Too much acetaminophen may cause liver damage. Prescription pain medicine may cause constipation. Ask your healthcare provider how to prevent or treat constipation.       •Antibiotics may be given to treat a bacterial infection.      •Take your medicine as directed. Contact your healthcare provider if you think your medicine is not helping or if you have side effects. Tell him or her if you are allergic to any medicine. Keep a list of the medicines, vitamins, and herbs you take. Include the amounts, and when and why you take them. Bring the list or the pill bottles to follow-up visits. Carry your medicine list with you in case of an emergency.      Self-care:   •Rest when you feel it is needed. Slowly start to do more each day. Return to your usual activities as directed.      •Do not drink any alcohol. If you need help to stop drinking, contact the following organization:   ?Alcoholics Anonymous  Web Address: http://www.aa.org          •Ask your healthcare provider or dietitian about the best foods to eat. You may need to eat foods that are low in fat if you have chronic pancreatitis.      •Do not smoke. Nicotine and other chemicals in cigarettes and cigars can cause damage. Ask your healthcare provider for information if you currently smoke and need help to quit. E-cigarettes or smokeless tobacco still contain nicotine. Talk to your healthcare provider before you use these products.       Follow up with your doctor as directed: Write down your questions so you remember to ask them during your visits.         Clear Liquid Diet    WHAT YOU NEED TO KNOW:    A clear liquid diet is made up of clear liquids and foods that are liquid at room temperature. The clear liquid diet provides liquids, sugar, salt, and some nutrients until you can eat solid food. The clear liquid diet does not provide all the nutrients, vitamins, minerals, or calories that your body needs. Your healthcare provider will tell you when you can start to eat regular foods.     DISCHARGE INSTRUCTIONS:    Liquids and foods to include:   •Clear juices (such as apple, cranberry, or grape), strained citrus juices or fruit punch      •Coffee or tea (without cream or milk)      •Water      •Clear sports drinks or soft drinks, such as ginger ale, lemon-lime soda, or club soda (no cola or root beer)      •Clear broth, bouillon, or consommé      •Plain popsicles (no popsicles with pureed fruit or fiber)      •Hard candy      •Flavored gelatin without fruit      Liquids and foods to avoid:   •All solid foods, such as bread, pasta, fruit, vegetables, dairy, and protein foods      •Beverages containing alcohol      •Dairy products, such as milk, hot cocoa, buttermilk, and cream      •Fruit smoothies, nectars, fruit juices with pulp, and prune juice      •Tomato and vegetable juices      •Soups that contain vegetables, noodles, rice, or meat      Sample menu for a clear liquid diet:   •Breakfast: 1 cup of juice, ¾ cup of clear broth, ½ cup of lemon gelatin, and 1 cup of coffee with sugar      •Morning snack: 1 cup of a clear sports drink      •Lunch: ½ cup of juice, ¾ cup of clear broth, ¾ cup of lemon-lime soda, and 1 popsicle (equals about 2 ounces of liquid)      •Afternoon snack: 1 popsicle       •Evening meal: ½ cup of juice, ¾ cup of clear broth, ¾ cup of ginger ale, ½ cup of flavored gelatin, and 1 cup of herbal tea with honey or sugar      •Evening snack: 1 cup of flavored gelatin      Risks: The clear liquid diet does not provide all the nutrients you need. You may need to drink a nutrition supplement if you have to follow this diet for more than 3 days. If you do not follow this diet, you may continue to have diarrhea, nausea, and vomiting if you were asked to follow this diet because of these problems.        © Copyright [employer] 2021         LOW FAT DIET    WHAT YOU NEED TO KNOW:    A low-fat diet is an eating plan that is low in total fat, unhealthy fat, and cholesterol. You may need to follow a low-fat diet if you have trouble digesting or absorbing fat. You may also need to follow this diet if you have high cholesterol. You can also lower your cholesterol by increasing the amount of fiber in your diet. Soluble fiber is a type of fiber that helps to decrease cholesterol levels.     DISCHARGE INSTRUCTIONS:    Different types of fat in food:   •Limit unhealthy fats. A diet that is high in cholesterol, saturated fat, and trans fat may cause unhealthy cholesterol levels. Unhealthy cholesterol levels increase your risk of heart disease.?Cholesterol: Limit intake of cholesterol to less than 200 mg per day. Cholesterol is found in meat, eggs, and dairy.      ?Saturated fat: Limit saturated fat to less than 7% of your total daily calories. Ask your dietitian how many calories you need each day. Saturated fat is found in butter, cheese, ice cream, whole milk, and palm oil. Saturated fat is also found in meat, such as beef, pork, chicken skin, and processed meats. Processed meats include sausage, hot dogs, and bologna.       ?Trans fat: Avoid trans fat as much as possible. Trans fat is used in fried and baked foods. Foods that say trans fat free on the label may still have up to 0.5 grams of trans fat per serving.      •Include healthy fats. Replace foods that are high in saturated and trans fat with foods high in healthy fats. This may help to decrease high cholesterol levels. ?Monounsaturated fats: These are found in avocados, nuts, and vegetable oils, such as olive, canola, and sunflower oil.      ?Polyunsaturated fats: These can be found in vegetable oils, such as soybean or corn oil. Omega-3 fats can help to decrease the risk of heart disease. Omega-3 fats are found in fish, such as salmon, herring, trout, and tuna. Omega-3 fats can also be found in plant foods, such as walnuts, flaxseed, soybeans, and canola oil.  Sources of Omega 3             Foods to limit or avoid:   •Grains: ?Snacks that are made with partially hydrogenated oils, such as chips, regular crackers, and butter-flavored popcorn      ?High-fat baked goods, such as biscuits, croissants, doughnuts, pies, cookies, and pastries      •Dairy: ?Whole milk, 2% milk, and yogurt and ice cream made with whole milk      ?Half and half creamer, heavy cream, and whipping cream      ?Cheese, cream cheese, and sour cream      •Meats and proteins: ?High-fat cuts of meat (T-bone steak, regular hamburger, and ribs)      ?Fried meat, poultry (turkey and chicken), and fish      ?Poultry (chicken and turkey) with skin      ?Cold cuts (salami or bologna), hot dogs, bryant, and sausage      ?Whole eggs and egg yolks      •Vegetables and fruits with added fat: ?Fried vegetables or vegetables in butter or high-fat sauces, such as cream or cheese sauces      ?Fried fruit or fruit served with butter or cream      •Fats: ?Butter, stick margarine, and shortening      ?Coconut, palm oil, and palm kernel oil        Foods to include:   •Grains: ?Whole-grain breads, cereals, pasta, and brown rice      ?Low-fat crackers and pretzels      •Vegetables and fruits: ?Fresh, frozen, or canned vegetables (no salt or low-sodium)      ?Fresh, frozen, dried, or canned fruit (canned in light syrup or fruit juice)      ?Avocado      •Low-fat dairy products: ?Nonfat (skim) or 1% milk      ?Nonfat or low-fat cheese, yogurt, and cottage cheese      •Meats and proteins: ?Chicken or turkey with no skin      ?Baked or broiled fish      ?Lean beef and pork (loin, round, extra lean hamburger)      ?Beans and peas, unsalted nuts, soy products      ?Egg whites and substitutes      ?Seeds and nuts      •Fats: ?Unsaturated oil, such as canola, olive, peanut, soybean, or sunflower oil      ?Soft or liquid margarine and vegetable oil spread      ?Low-fat salad dressing        Other ways to decrease fat:   •Read food labels before you buy foods. Choose foods that have less than 30% of calories from fat. Choose low-fat or fat-free dairy products. Remember that fat free does not mean calorie free. These foods still contain calories, and too many calories can lead to weight gain.       •Trim fat from meat and avoid fried food. Trim all visible fat from meat before you cook it. Remove the skin from poultry. Do not li meat, fish, or poultry. Bake, roast, boil, or broil these foods instead. Avoid fried foods. Eat a baked potato instead of French fries. Steam vegetables instead of sautéing them in butter.       •Add less fat to foods. Use imitation bryant bits on salads and baked potatoes instead of regular bryant bits. Use fat-free or low-fat salad dressings instead of regular dressings. Use low-fat or nonfat butter-flavored topping instead of regular butter or margarine on popcorn and other foods.      Ways to decrease fat in recipes: Replace high-fat ingredients with low-fat or nonfat ones. This may cause baked goods to be drier than usual. You may need to use nonfat cooking spray on pans to prevent food from sticking. You also may need to change the amount of other ingredients, such as water, in the recipe. Try the following:   •Use low-fat or light margarine instead of regular margarine or shortening.       •Use lean ground turkey breast or chicken, or lean ground beef (less than 5% fat) instead of hamburger.       •Add 1 teaspoon of canola oil to 8 ounces of skim milk instead of using cream or half and half.       •Use grated zucchini, carrots, or apples in breads instead of coconut.      •Use blenderized, low-fat cottage cheese, plain tofu, or low-fat ricotta cheese instead of cream cheese.       •Use 1 egg white and 1 teaspoon of canola oil, or use ¼ cup (2 ounces) of fat-free egg substitute instead of a whole egg.       •Replace half of the oil that is called for in a recipe with applesauce when you bake. Use 3 tablespoons of cocoa powder and 1 tablespoon of canola oil instead of a square of baking chocolate.      How to increase fiber: Eat enough high-fiber foods to get 20 to 30 grams of fiber every day. Slowly increase your fiber intake to avoid stomach cramps, gas, and other problems.   •Eat 3 ounces of whole-grain foods each day. An ounce is about 1 slice of bread. Eat whole-grain breads, such as whole-wheat bread. Whole wheat, whole-wheat flour, or other whole grains should be listed as the first ingredient on the food label. Replace white flour with whole-grain flour or use half of each in recipes. Whole-grain flour is heavier than white flour, so you may have to add more yeast or baking powder.       •Eat a high-fiber cereal for breakfast. Oatmeal is a good source of soluble fiber. Look for cereals that have bran or fiber in the name. Choose whole-grain products, such as brown rice, barley, and whole-wheat pasta.       •Eat more beans, peas, and lentils. For example, add beans to soups or salads. Eat at least 5 cups of fruits and vegetables each day. Eat fruits and vegetables with the peel because the peel is high in fiber.

## 2021-11-16 NOTE — ED PROVIDER NOTE - PROGRESS NOTE DETAILS
Pt has tolerated PO. Pt wants to go home. [place] score 2, severe pancreatitis unlikely. no complicated findings on CT. D/w pt clear liquid diet until no pain, and slowly introducing low-fat solid diet. D/w pt strict return precautions for worsening pain, vomiting, fever, jaundice, or other concerning symptoms. Close f/u PCP/ GI. Pt demonstrates understanding of plan

## 2021-11-16 NOTE — ED PROVIDER NOTE - CLINICAL SUMMARY MEDICAL DECISION MAKING FREE TEXT BOX
The patient was provided basic labs including CBC, CMP as well as a cardiac work up including EKG, CXR, troponin and, CT chest angiogram to rule out aortic dissection. Patient was provided GI cocktail for epigastric abdominal pain. 77 y/o m hx HTN, HLD, CABG presents with epigastric abd pain since last night; pt afebrile in ED, vss, has equal BPs b/l, comfortable currently.  Pain improved with GI cocktail, lipase noted to be elevated 536 although pt with no pain currently, no vomiting.  Will plan on CT a/p for further eval, f/u results, reassess.

## 2021-11-16 NOTE — ED ADULT NURSE NOTE - NSICDXPASTSURGICALHX_GEN_ALL_CORE_FT
PAST SURGICAL HISTORY:  History of coronary artery bypass graft x 2     Other postprocedural status S/P ear surgery    Status post cataract extraction bilateral

## 2021-11-18 ENCOUNTER — RX RENEWAL (OUTPATIENT)
Age: 76
End: 2021-11-18

## 2021-11-23 ENCOUNTER — APPOINTMENT (OUTPATIENT)
Dept: VASCULAR SURGERY | Facility: CLINIC | Age: 76
End: 2021-11-23
Payer: COMMERCIAL

## 2021-11-23 VITALS
DIASTOLIC BLOOD PRESSURE: 60 MMHG | WEIGHT: 130 LBS | HEIGHT: 65 IN | BODY MASS INDEX: 21.66 KG/M2 | HEART RATE: 77 BPM | SYSTOLIC BLOOD PRESSURE: 101 MMHG

## 2021-11-23 PROCEDURE — 99204 OFFICE O/P NEW MOD 45 MIN: CPT

## 2021-11-23 NOTE — REVIEW OF SYSTEMS
[As Noted in HPI] : as noted in HPI [Shortness Of Breath] : shortness of breath [Negative] : Heme/Lymph

## 2021-11-24 NOTE — ADDENDUM
[FreeTextEntry1] : This note was written by Yakov Linares, acting as a scribe for Dr. Marlo Herrmann.  I, Dr. Marlo Herrmann, have read and attest that all the information, medical decision-making, and discharge instructions within are true and accurate.\par \par I, Dr. Marlo Herrmann, personally performed the evaluation and management (E/M) services for this new patient.  That E/M includes conducting the initial examination, assessing all conditions, and establishing the plan of care.  Today, my ACP, Yakov Linares, was here to observe my evaluation and management services for this patient to be followed going forward.

## 2021-11-24 NOTE — PHYSICAL EXAM
[Normal Thyroid] : the thyroid was normal [Normal Breath Sounds] : Normal breath sounds [Normal Heart Sounds] : normal heart sounds [Normal Rate and Rhythm] : normal rate and rhythm [2+] : left 2+ [1+] : right 1+ [0] : left 0 [No HSM] : no hepatosplenomegaly [No Rash or Lesion] : No rash or lesion [Alert] : alert [Calm] : calm [JVD] : no jugular venous distention  [Carotid Bruits] : no carotid bruits [Right Carotid Bruit] : no bruit heard over the right carotid [Left Carotid Bruit] : no bruit heard over the left carotid [Right Femoral Bruit] : no bruit heard over the right femoral artery [Left Femoral Bruit] : no bruit heard over the left femoral artery [Ankle Swelling (On Exam)] : not present [Varicose Veins Of Lower Extremities] : not present [] : not present [Abdomen Masses] : No abdominal masses [Abdomen Tenderness] : ~T ~M No abdominal tenderness [Purpura] : no purpura  [Petechiae] : no petechiae [Skin Ulcer] : no ulcer [Skin Induration] : no induration [de-identified] : Thin habitus, NAD [de-identified] : NC/AT, anicteric [de-identified] : SNTND [de-identified] : FROM throughout, strength 5/5x4, no palpable cords in LEs, no muscle atrophy noted [de-identified] : Dry skin noted on feet/toes

## 2021-11-24 NOTE — HISTORY OF PRESENT ILLNESS
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 Johnson for evaluation of his LE circulation in the setting of mild LLE claudication.  Pt is able to walk 10-15 blocks w/o LE symptoms, stopping only due to SOB that began after nehemias COVID in 03/2020.  If he is able to walk beyond 15 blocks, his L calf does become tight and painful, forcing him to rest.  Mr. Wright denies rest pain in the legs or feet, and denies swelling or tissue loss/infections in the legs.  Resting SYL/PVRs performed 2wks prior revealed moderate LLE arterial disease.  He does report some muscle cramping when he sits too long.

## 2021-11-24 NOTE — ASSESSMENT
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 Johnson for evaluation of his LE circulation in the setting of mild LLE claudication.  Pt is able to walk 10-15 blocks w/o LE symptoms, stopping only due to SOB that began after nehemias COVID in 03/2020.  If he is able to walk beyond 15 blocks, his L calf does become tight and painful, forcing him to rest.  Mr. Wright denies rest pain in the legs or feet, and denies swelling or tissue loss/infections in the legs.  Resting SYL/PVRs performed 2wks prior revealed moderate LLE arterial disease.  He does report some muscle cramping when he sits too long.\par \par Outside resting SYL/PVRs revealed SYL measurements c/w mild RLE arterial disease, moderate LLE arterial disease, non-compressible PPGs in the L toes; SYL 0.75/1.24 L/R, respectively.  Legs well-perfused on exam today, no evidence of ischemia/tissue loss.  As pt is not currently suffering from severe claudication symptoms and there is no current limb threat, recommend only to proceed conservatively w/medical management and exercise at this time.  If symptoms worsen, would further evaluate w/arterial duplex and possible LLE angio.\par \par I, Dr. Marlo Herrmann, personally performed the evaluation and management (E/M) services for this new patient.  That E/M includes conducting the initial examination, assessing all conditions, and establishing the plan of care.  Today, ACP, Yakov Lu, was here to observe my evaluation and management services for this patient to be followed going forward.

## 2021-11-24 NOTE — DATA REVIEWED
[FreeTextEntry1] : Outside resting SYL/PVRs revealed SYL measurements c/w mild RLE arterial disease, moderate LLE arterial disease, non-compressible PPGs in the L toes; SYL 0.75/1.24 L/R, respectively

## 2022-01-27 ENCOUNTER — APPOINTMENT (OUTPATIENT)
Dept: HEART AND VASCULAR | Facility: CLINIC | Age: 77
End: 2022-01-27
Payer: COMMERCIAL

## 2022-01-27 VITALS
HEIGHT: 65 IN | BODY MASS INDEX: 19.82 KG/M2 | SYSTOLIC BLOOD PRESSURE: 105 MMHG | TEMPERATURE: 97.5 F | WEIGHT: 118.98 LBS | DIASTOLIC BLOOD PRESSURE: 57 MMHG | HEART RATE: 83 BPM | OXYGEN SATURATION: 95 %

## 2022-01-27 PROCEDURE — 99214 OFFICE O/P EST MOD 30 MIN: CPT

## 2022-01-27 PROCEDURE — 36415 COLL VENOUS BLD VENIPUNCTURE: CPT

## 2022-01-27 NOTE — HISTORY OF PRESENT ILLNESS
[FreeTextEntry1] : 76 m LIMA to LAD, stent to LCX and LAD HTN DM PAD calcified arteries on SYL/PVR GI bleed COVID Lung\par \par he has lost a lot of weight said its due to loss of taste he does have an appetite he is tired no cp no dizziness \par

## 2022-01-27 NOTE — PROCEDURE
[Tc-99m, sestamibi-Cardiolite, to 40mCi, dose-Radionuclides-Enikos code--v.1-Active-Trade] : Tc-99m, sestamibi-Cardiolite, to 40mCi, dose-Radionuclides-Enikos code--v.1-Active-Trade [Normal] : 3. No pericardial effusion. [ECG Atrial Arrhythmias] : no arrhythmias [de-identified] : Given via the IV route.    1 Day Protocol. Rest/Stress. SPECT. Gated. [de-identified] : 10.4 mCi [de-identified] : Given via the IV route.  Injection time and Heart Rate [de-identified] : 28.7 [de-identified] : 118 [de-identified] : 160/56 [de-identified] : 4.7 METS; 81 [de-identified] : 69 [TWNoteComboBox1] : DAVID [TWNoteComboBox2] : Fabricio [TWNoteComboBox4] : dyspnea [TWNoteComboBox3] : 1 [TWNoteComboBox5] : dyspnea [de-identified] : Dr. Franco Johnson (card) [de-identified] : Nicole Petri-Schoener, ENOCCS [de-identified] : shortness of breath [de-identified] : 1.1 [de-identified] : 0.9 [de-identified] : 3.7 [de-identified] : 1.8 [de-identified] : 3.0 [de-identified] : 3.3 [de-identified] : 33 mmHg [de-identified] : 60-55 [de-identified] : Mild concentric left ventricular hypertrophy. Age appropriate diastolic function [de-identified] : The left atrium is normal in size. The left atrial volume index is 23 ml/m2. [FreeTextEntry1] : There is mild  posterior mitral annular calcification. There is minimal mitral regurgitation. There is no mitral stenosis. [de-identified] : There is trace pulmonic insufficiency. [de-identified] : There is minimal tricuspid regurgitation. [de-identified] : The inferior vena cava measures 1.2 cm. [de-identified] : Mitral annular calcification

## 2022-01-27 NOTE — ASSESSMENT
[FreeTextEntry1] : Dm he does not want to take trulicity\par htn controlled  his bp is ok he has no dizziness stay on irbesartan \par his soboe due to covid lung he has severely diminished exercise capacity \par check lipid panel\par weight loss return in one month if his weight continues to drop will do a ct chest abd pelvis\par \par fu in one month\par \par \par \par \par

## 2022-01-27 NOTE — REASON FOR VISIT
[Coronary Artery Disease] : coronary artery disease [Follow-Up - Clinic] : a clinic follow-up of [FreeTextEntry2] : fatigue

## 2022-01-28 LAB
ALBUMIN SERPL ELPH-MCNC: 3.7 G/DL
ALP BLD-CCNC: 87 U/L
ALT SERPL-CCNC: 16 U/L
ANION GAP SERPL CALC-SCNC: 13 MMOL/L
AST SERPL-CCNC: 21 U/L
BASOPHILS # BLD AUTO: 0.11 K/UL
BASOPHILS NFR BLD AUTO: 1 %
BILIRUB SERPL-MCNC: 0.7 MG/DL
BUN SERPL-MCNC: 18 MG/DL
CALCIUM SERPL-MCNC: 9.3 MG/DL
CHLORIDE SERPL-SCNC: 100 MMOL/L
CHOLEST SERPL-MCNC: 86 MG/DL
CO2 SERPL-SCNC: 19 MMOL/L
CREAT SERPL-MCNC: 0.87 MG/DL
EOSINOPHIL # BLD AUTO: 1.07 K/UL
EOSINOPHIL NFR BLD AUTO: 9.9 %
ESTIMATED AVERAGE GLUCOSE: 171 MG/DL
GLUCOSE SERPL-MCNC: 168 MG/DL
HBA1C MFR BLD HPLC: 7.6 %
HCT VFR BLD CALC: 41.4 %
HDLC SERPL-MCNC: 36 MG/DL
HGB BLD-MCNC: 13.2 G/DL
IMM GRANULOCYTES NFR BLD AUTO: 0.3 %
LDLC SERPL CALC-MCNC: 31 MG/DL
LYMPHOCYTES # BLD AUTO: 2.53 K/UL
LYMPHOCYTES NFR BLD AUTO: 23.3 %
MAN DIFF?: NORMAL
MCHC RBC-ENTMCNC: 28 PG
MCHC RBC-ENTMCNC: 31.9 GM/DL
MCV RBC AUTO: 87.7 FL
MONOCYTES # BLD AUTO: 0.94 K/UL
MONOCYTES NFR BLD AUTO: 8.7 %
NEUTROPHILS # BLD AUTO: 6.17 K/UL
NEUTROPHILS NFR BLD AUTO: 56.8 %
NONHDLC SERPL-MCNC: 50 MG/DL
PLATELET # BLD AUTO: 331 K/UL
POTASSIUM SERPL-SCNC: 5.1 MMOL/L
PROT SERPL-MCNC: 7.8 G/DL
RBC # BLD: 4.72 M/UL
RBC # FLD: 14.8 %
SODIUM SERPL-SCNC: 133 MMOL/L
T4 FREE SERPL-MCNC: 1.1 NG/DL
TRIGL SERPL-MCNC: 97 MG/DL
TSH SERPL-ACNC: 3.62 UIU/ML
WBC # FLD AUTO: 10.85 K/UL

## 2022-02-09 ENCOUNTER — APPOINTMENT (OUTPATIENT)
Dept: HEART AND VASCULAR | Facility: CLINIC | Age: 77
End: 2022-02-09
Payer: COMMERCIAL

## 2022-02-09 VITALS
WEIGHT: 120.99 LBS | DIASTOLIC BLOOD PRESSURE: 50 MMHG | SYSTOLIC BLOOD PRESSURE: 120 MMHG | TEMPERATURE: 96.5 F | HEIGHT: 65 IN | OXYGEN SATURATION: 96 % | HEART RATE: 88 BPM | BODY MASS INDEX: 20.16 KG/M2

## 2022-02-09 PROCEDURE — 36415 COLL VENOUS BLD VENIPUNCTURE: CPT

## 2022-02-09 PROCEDURE — 99214 OFFICE O/P EST MOD 30 MIN: CPT

## 2022-02-09 NOTE — PROCEDURE
[Tc-99m, sestamibi-Cardiolite, to 40mCi, dose-Radionuclides-Revert code--v.1-Active-Trade] : Tc-99m, sestamibi-Cardiolite, to 40mCi, dose-Radionuclides-Revert code--v.1-Active-Trade [Normal] : 3. No pericardial effusion. [ECG Atrial Arrhythmias] : no arrhythmias [de-identified] : Given via the IV route.    1 Day Protocol. Rest/Stress. SPECT. Gated. [de-identified] : 10.4 mCi [de-identified] : Given via the IV route.  Injection time and Heart Rate [de-identified] : 28.7 [de-identified] : 118 [de-identified] : 160/56 [de-identified] : 4.7 METS; 81 [de-identified] : 69 [TWNoteComboBox1] : DAVID [TWNoteComboBox2] : Fabricio [TWNoteComboBox4] : dyspnea [TWNoteComboBox3] : 1 [TWNoteComboBox5] : dyspnea [de-identified] : Dr. Franco Johnson (card) [de-identified] : Nicole Petri-Schoener, ENOCCS [de-identified] : shortness of breath [de-identified] : 1.1 [de-identified] : 0.9 [de-identified] : 3.7 [de-identified] : 1.8 [de-identified] : 3.0 [de-identified] : 3.3 [de-identified] : 33 mmHg [de-identified] : 60-66 [de-identified] : Mild concentric left ventricular hypertrophy. Age appropriate diastolic function [de-identified] : The left atrium is normal in size. The left atrial volume index is 23 ml/m2. [FreeTextEntry1] : There is mild  posterior mitral annular calcification. There is minimal mitral regurgitation. There is no mitral stenosis. [de-identified] : There is trace pulmonic insufficiency. [de-identified] : There is minimal tricuspid regurgitation. [de-identified] : The inferior vena cava measures 1.2 cm. [de-identified] : Mitral annular calcification

## 2022-02-09 NOTE — ASSESSMENT
[FreeTextEntry1] : Dm controlled jardiance may be causing his dizziness\par reduce amlodipine to 5 mg daily \par his soboe due to covid lung he has severely diminished exercise capacity \par check lipid panel\par weight loss return in one month if his weight continues to drop will do a ct chest abd pelvis\par \par fu in one month\par \par \par \par \par

## 2022-02-09 NOTE — HISTORY OF PRESENT ILLNESS
[FreeTextEntry1] : 76 m LIMA to LAD, stent to LCX and LAD HTN DM PAD calcified arteries on SYL/PVR GI bleed COVID Lung\par \par he has lost a lot of weight said its due to loss of taste he does have an appetite he is tired no cp no dizziness BP drops to the 90's with symptoms

## 2022-02-10 LAB
ANION GAP SERPL CALC-SCNC: 11 MMOL/L
BUN SERPL-MCNC: 14 MG/DL
CALCIUM SERPL-MCNC: 10 MG/DL
CHLORIDE SERPL-SCNC: 102 MMOL/L
CO2 SERPL-SCNC: 21 MMOL/L
CREAT SERPL-MCNC: 0.86 MG/DL
GLUCOSE SERPL-MCNC: 186 MG/DL
POTASSIUM SERPL-SCNC: 6.1 MMOL/L
SODIUM SERPL-SCNC: 134 MMOL/L

## 2022-02-13 LAB
ANION GAP SERPL CALC-SCNC: 12 MMOL/L
BUN SERPL-MCNC: 16 MG/DL
CALCIUM SERPL-MCNC: 9.8 MG/DL
CHLORIDE SERPL-SCNC: 104 MMOL/L
CO2 SERPL-SCNC: 22 MMOL/L
CREAT SERPL-MCNC: 0.94 MG/DL
GLUCOSE SERPL-MCNC: 178 MG/DL
POTASSIUM SERPL-SCNC: 5 MMOL/L
SODIUM SERPL-SCNC: 137 MMOL/L

## 2022-03-03 ENCOUNTER — APPOINTMENT (OUTPATIENT)
Dept: HEART AND VASCULAR | Facility: CLINIC | Age: 77
End: 2022-03-03

## 2022-03-09 ENCOUNTER — APPOINTMENT (OUTPATIENT)
Dept: HEART AND VASCULAR | Facility: CLINIC | Age: 77
End: 2022-03-09
Payer: COMMERCIAL

## 2022-03-09 VITALS
OXYGEN SATURATION: 95 % | TEMPERATURE: 97.6 F | BODY MASS INDEX: 19.49 KG/M2 | DIASTOLIC BLOOD PRESSURE: 60 MMHG | WEIGHT: 117 LBS | HEART RATE: 87 BPM | SYSTOLIC BLOOD PRESSURE: 110 MMHG | HEIGHT: 65 IN

## 2022-03-09 PROCEDURE — 99214 OFFICE O/P EST MOD 30 MIN: CPT

## 2022-03-09 RX ORDER — AMLODIPINE BESYLATE 5 MG/1
5 TABLET ORAL DAILY
Qty: 30 | Refills: 0 | Status: DISCONTINUED | COMMUNITY
Start: 2019-03-26 | End: 2022-03-09

## 2022-03-09 NOTE — HISTORY OF PRESENT ILLNESS
[FreeTextEntry1] : 76 m LIMA to LAD, stent to LCX and LAD HTN DM PAD calcified arteries on SYL/PVR GI bleed COVID Lung\par \par he has lost a lot of weight said its due to loss of taste he does have an appetite he is tired no cp no dizziness BP drops to the 90's with symptoms daughter is here with him and reports he seems disengaged and not participating with the family

## 2022-03-09 NOTE — ASSESSMENT
[FreeTextEntry1] : Dm controlled jardiance may be causing his dizziness\par stop amlodipine\par his soboe due to covid lung he has severely diminished exercise capacity \par unintentional weight loss plan for ct chest abd pelvis\par \par fu in one month\par \par \par \par \par

## 2022-03-09 NOTE — PROCEDURE
[Tc-99m, sestamibi-Cardiolite, to 40mCi, dose-Radionuclides-Vouchercloud code--v.1-Active-Trade] : Tc-99m, sestamibi-Cardiolite, to 40mCi, dose-Radionuclides-Vouchercloud code--v.1-Active-Trade [Normal] : 3. No pericardial effusion. [ECG Atrial Arrhythmias] : no arrhythmias [de-identified] : Given via the IV route.    1 Day Protocol. Rest/Stress. SPECT. Gated. [de-identified] : 10.4 mCi [de-identified] : Given via the IV route.  Injection time and Heart Rate [de-identified] : 28.7 [de-identified] : 118 [de-identified] : 160/56 [de-identified] : 4.7 METS; 81 [de-identified] : 69 [TWNoteComboBox1] : DAVID [TWNoteComboBox2] : Fabricio [TWNoteComboBox4] : dyspnea [TWNoteComboBox3] : 1 [TWNoteComboBox5] : dyspnea [de-identified] : Dr. Franco Johnson (card) [de-identified] : Nicole Petri-Schoener, ENOCCS [de-identified] : shortness of breath [de-identified] : 1.1 [de-identified] : 0.9 [de-identified] : 3.7 [de-identified] : 1.8 [de-identified] : 3.0 [de-identified] : 3.3 [de-identified] : 33 mmHg [de-identified] : 60-08 [de-identified] : Mild concentric left ventricular hypertrophy. Age appropriate diastolic function [de-identified] : The left atrium is normal in size. The left atrial volume index is 23 ml/m2. [FreeTextEntry1] : There is mild  posterior mitral annular calcification. There is minimal mitral regurgitation. There is no mitral stenosis. [de-identified] : There is trace pulmonic insufficiency. [de-identified] : There is minimal tricuspid regurgitation. [de-identified] : The inferior vena cava measures 1.2 cm. [de-identified] : Mitral annular calcification

## 2022-03-14 ENCOUNTER — RESULT REVIEW (OUTPATIENT)
Age: 77
End: 2022-03-14

## 2022-03-22 ENCOUNTER — APPOINTMENT (OUTPATIENT)
Dept: CT IMAGING | Facility: HOSPITAL | Age: 77
End: 2022-03-22

## 2022-03-22 ENCOUNTER — OUTPATIENT (OUTPATIENT)
Dept: OUTPATIENT SERVICES | Facility: HOSPITAL | Age: 77
LOS: 1 days | End: 2022-03-22
Payer: COMMERCIAL

## 2022-03-22 DIAGNOSIS — Z95.1 PRESENCE OF AORTOCORONARY BYPASS GRAFT: Chronic | ICD-10-CM

## 2022-03-22 LAB — POCT ISTAT CREATININE: 1 MG/DL — SIGNIFICANT CHANGE UP (ref 0.5–1.3)

## 2022-03-22 PROCEDURE — 71260 CT THORAX DX C+: CPT | Mod: 26

## 2022-03-22 PROCEDURE — 74177 CT ABD & PELVIS W/CONTRAST: CPT

## 2022-03-22 PROCEDURE — 74177 CT ABD & PELVIS W/CONTRAST: CPT | Mod: 26

## 2022-03-22 PROCEDURE — 82565 ASSAY OF CREATININE: CPT

## 2022-03-22 PROCEDURE — 71260 CT THORAX DX C+: CPT

## 2022-04-12 ENCOUNTER — APPOINTMENT (OUTPATIENT)
Dept: HEART AND VASCULAR | Facility: CLINIC | Age: 77
End: 2022-04-12

## 2022-04-25 NOTE — HISTORY OF PRESENT ILLNESS
[TextBox_4] : 07/15/2020: Asked to evaluate patient by Dr Johnson for dyspnea. He has coronary disease. 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 3 mos ago he had Covid - fever, myalgias, cough, no dyspnea, isolated at home, not admitted. Now Ab positive. Returned to work last month and now is dyspneic with walking. Not dyspneic at rest. Never had lung disease. Quit smoking in his 40s. Lives in [place], born in [facility].\par \par 8/6/20: Feels better than last visit. Not working but walking 30-60 min a day, w dyspnea. No other changes. Blood glucose is well controlled. Started him on 30mg of prednisone for the nonresolving infiltrates w exertional hypoxia.\par \par 8/20/20: Feels a little better on 30mg prednisone. Blood glucose is up. No other changes. Taking Nexium, no GI symptoms.\par \par 9/30/20 [Raymundo]: on Prednisone 10 mg /day now, no worsening of SOB / cough, leg swelling is better, DM -2 with elevated BG in 300-400, not on insulin. taking nexium. feels lethargic sometimes. \par \par 11/25/20 [Raymundo]: here for follow up. Had stopped steroids about 45 days ago, has been walking 3-5 k steps a day, not GAMBINO if he walks with slower pace but dyspenic on exertion,occasional cough with clear sputum if exposed to dust, No chest pain / fever. He plans to go back to work in a week. many questions regarding prevention of another infection of COVID, need for prophylactic antibiotics. \par \par 10/1/21: For some logistics reason unable to get follow up CT as planned. He is working, but is very dyspneic, very tiring, working full time. He can't retire because his daughter has been accepted to medical school. Had the Covid vaccines. Detail Level: Simple

## 2022-05-19 ENCOUNTER — APPOINTMENT (OUTPATIENT)
Dept: PULMONOLOGY | Facility: CLINIC | Age: 77
End: 2022-05-19
Payer: COMMERCIAL

## 2022-05-19 VITALS
HEIGHT: 65 IN | DIASTOLIC BLOOD PRESSURE: 70 MMHG | OXYGEN SATURATION: 92 % | BODY MASS INDEX: 19.49 KG/M2 | TEMPERATURE: 97.2 F | SYSTOLIC BLOOD PRESSURE: 120 MMHG | WEIGHT: 117 LBS | HEART RATE: 95 BPM

## 2022-05-19 PROCEDURE — 99072 ADDL SUPL MATRL&STAF TM PHE: CPT

## 2022-05-19 PROCEDURE — 99213 OFFICE O/P EST LOW 20 MIN: CPT

## 2022-05-23 LAB — SARS-COV-2 N GENE NPH QL NAA+PROBE: NOT DETECTED

## 2022-05-24 ENCOUNTER — RX RENEWAL (OUTPATIENT)
Age: 77
End: 2022-05-24

## 2022-05-24 ENCOUNTER — APPOINTMENT (OUTPATIENT)
Dept: PULMONOLOGY | Facility: CLINIC | Age: 77
End: 2022-05-24
Payer: COMMERCIAL

## 2022-05-24 PROCEDURE — 94727 GAS DIL/WSHOT DETER LNG VOL: CPT

## 2022-05-24 PROCEDURE — 94618 PULMONARY STRESS TESTING: CPT

## 2022-05-24 PROCEDURE — 94729 DIFFUSING CAPACITY: CPT

## 2022-05-24 PROCEDURE — 95012 NITRIC OXIDE EXP GAS DETER: CPT

## 2022-05-24 PROCEDURE — 94060 EVALUATION OF WHEEZING: CPT

## 2022-05-24 PROCEDURE — 99072 ADDL SUPL MATRL&STAF TM PHE: CPT

## 2022-05-24 NOTE — PHYSICAL EXAM
[No Acute Distress] : no acute distress [Normal Rate/Rhythm] : normal rate/rhythm [Normal S1, S2] : normal s1, s2 [No Murmurs] : no murmurs [Normal Affect] : normal affect [TextBox_68] : crackles

## 2022-05-24 NOTE — HISTORY OF PRESENT ILLNESS
[TextBox_4] : 07/15/2020: Asked to evaluate patient by Dr Johnson for dyspnea. He has coronary disease. 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. 3 mos ago he had Covid - fever, myalgias, cough, no dyspnea, isolated at home, not admitted. Now Ab positive. Returned to work last month and now is dyspneic with walking. Not dyspneic at rest. Never had lung disease. Quit smoking in his 40s. Lives in [place], born in [facility].\par \par 8/6/20: Feels better than last visit. Not working but walking 30-60 min a day, w dyspnea. No other changes. Blood glucose is well controlled. Started him on 30mg of prednisone for the nonresolving infiltrates w exertional hypoxia.\par \par 8/20/20: Feels a little better on 30mg prednisone. Blood glucose is up. No other changes. Taking Nexium, no GI symptoms.\par \par 9/30/20 [Raymundo]: on Prednisone 10 mg /day now, no worsening of SOB / cough, leg swelling is better, DM -2 with elevated BG in 300-400, not on insulin. taking nexium. feels lethargic sometimes. \par \par 11/25/20 [Raymundo]: here for follow up. Had stopped steroids about 45 days ago, has been walking 3-5 k steps a day, not GAMBINO if he walks with slower pace but dyspenic on exertion,occasional cough with clear sputum if exposed to dust, No chest pain / fever. He plans to go back to work in a week. many questions regarding prevention of another infection of COVID, need for prophylactic antibiotics. \par \par 10/1/21: For some logistics reason unable to get follow up CT as planned. He is working, but is very dyspneic, very tiring, working full time. He can't retire because his daughter has been accepted to medical school. Had the Covid vaccines.\par \par 5/19/22: Daughter 2nd year med student at [employer] - he's not paying, she's taking loans. He is still working. He did online rehab without much benefit. He is very dyspneic. WHen he gets very dyspneic he also coughs. All of this is a problem at work. 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 Feels he needs to work for another 2 years.

## 2022-05-27 ENCOUNTER — APPOINTMENT (OUTPATIENT)
Dept: HEART AND VASCULAR | Facility: CLINIC | Age: 77
End: 2022-05-27
Payer: COMMERCIAL

## 2022-05-27 VITALS
TEMPERATURE: 97.3 F | HEART RATE: 71 BPM | OXYGEN SATURATION: 96 % | WEIGHT: 118.98 LBS | DIASTOLIC BLOOD PRESSURE: 71 MMHG | HEIGHT: 65 IN | BODY MASS INDEX: 19.82 KG/M2 | SYSTOLIC BLOOD PRESSURE: 134 MMHG

## 2022-05-27 PROCEDURE — 93000 ELECTROCARDIOGRAM COMPLETE: CPT

## 2022-05-27 PROCEDURE — 36415 COLL VENOUS BLD VENIPUNCTURE: CPT

## 2022-05-27 PROCEDURE — 99214 OFFICE O/P EST MOD 30 MIN: CPT

## 2022-05-27 NOTE — PROCEDURE
[Tc-99m, sestamibi-Cardiolite, to 40mCi, dose-Radionuclides-Peer60 code--v.1-Active-Trade] : Tc-99m, sestamibi-Cardiolite, to 40mCi, dose-Radionuclides-Peer60 code--v.1-Active-Trade [Normal] : 3. No pericardial effusion. [ECG Atrial Arrhythmias] : no arrhythmias [de-identified] : Given via the IV route.    1 Day Protocol. Rest/Stress. SPECT. Gated. [de-identified] : 10.4 mCi [de-identified] : Given via the IV route.  Injection time and Heart Rate [de-identified] : 28.7 [de-identified] : 118 [de-identified] : 160/56 [de-identified] : 4.7 METS; 81 [de-identified] : 69 [TWNoteComboBox1] : DAVID [TWNoteComboBox2] : Fabricio [TWNoteComboBox4] : dyspnea [TWNoteComboBox3] : 1 [TWNoteComboBox5] : dyspnea [de-identified] : Dr. Franco Johnson (card) [de-identified] : Nicole Petri-Schoener, ENOCCS [de-identified] : shortness of breath [de-identified] : 1.1 [de-identified] : 0.9 [de-identified] : 3.7 [de-identified] : 1.8 [de-identified] : 3.0 [de-identified] : 3.3 [de-identified] : 33 mmHg [de-identified] : 60-38 [de-identified] : Mild concentric left ventricular hypertrophy. Age appropriate diastolic function [de-identified] : The left atrium is normal in size. The left atrial volume index is 23 ml/m2. [FreeTextEntry1] : There is mild  posterior mitral annular calcification. There is minimal mitral regurgitation. There is no mitral stenosis. [de-identified] : There is trace pulmonic insufficiency. [de-identified] : There is minimal tricuspid regurgitation. [de-identified] : The inferior vena cava measures 1.2 cm. [de-identified] : Mitral annular calcification

## 2022-05-27 NOTE — HISTORY OF PRESENT ILLNESS
[FreeTextEntry1] : 77 m LIMA to LAD, stent to LCX and LAD HTN DM PAD calcified arteries on SYL/PVR GI bleed COVID Lung\par \par \par \par he feels sob when he walks wants to take a leave of absence \par \par \par \par ecg sr rbbb 5/27/2020\par echo 11/3/2021 EF normal PASP 60-65%\par stress test 1/2021 4.7 METS small reversible apical defect

## 2022-05-27 NOTE — ASSESSMENT
[FreeTextEntry1] : dizzines is gone his bp is controlled on irbesartan\par his soboe due to covid lung he has severely diminished exercise capacity \par he has lung scar since due to covid\par will do stress test and echo\par CAD on rosuvastatin 10 mg daily\par \par fu in three months\par \par \par \par \par

## 2022-05-31 LAB
ALBUMIN SERPL ELPH-MCNC: 4.2 G/DL
ALP BLD-CCNC: 83 U/L
ALT SERPL-CCNC: 20 U/L
ANION GAP SERPL CALC-SCNC: 15 MMOL/L
AST SERPL-CCNC: 19 U/L
BASOPHILS # BLD AUTO: 0.12 K/UL
BASOPHILS NFR BLD AUTO: 1.3 %
BILIRUB SERPL-MCNC: 0.4 MG/DL
BUN SERPL-MCNC: 15 MG/DL
CALCIUM SERPL-MCNC: 9.5 MG/DL
CHLORIDE SERPL-SCNC: 100 MMOL/L
CHOLEST SERPL-MCNC: 121 MG/DL
CO2 SERPL-SCNC: 20 MMOL/L
CREAT SERPL-MCNC: 0.89 MG/DL
EGFR: 88 ML/MIN/1.73M2
EOSINOPHIL # BLD AUTO: 1.06 K/UL
EOSINOPHIL NFR BLD AUTO: 11.7 %
ESTIMATED AVERAGE GLUCOSE: 166 MG/DL
GLUCOSE SERPL-MCNC: 104 MG/DL
HBA1C MFR BLD HPLC: 7.4 %
HCT VFR BLD CALC: 49.8 %
HDLC SERPL-MCNC: 39 MG/DL
HGB BLD-MCNC: 15.4 G/DL
IMM GRANULOCYTES NFR BLD AUTO: 0.2 %
LDLC SERPL CALC-MCNC: 67 MG/DL
LYMPHOCYTES # BLD AUTO: 2.56 K/UL
LYMPHOCYTES NFR BLD AUTO: 28.3 %
MAN DIFF?: NORMAL
MCHC RBC-ENTMCNC: 27.7 PG
MCHC RBC-ENTMCNC: 30.9 GM/DL
MCV RBC AUTO: 89.6 FL
MONOCYTES # BLD AUTO: 0.77 K/UL
MONOCYTES NFR BLD AUTO: 8.5 %
NEUTROPHILS # BLD AUTO: 4.53 K/UL
NEUTROPHILS NFR BLD AUTO: 50 %
NONHDLC SERPL-MCNC: 83 MG/DL
NT-PROBNP SERPL-MCNC: 62 PG/ML
PLATELET # BLD AUTO: 337 K/UL
POTASSIUM SERPL-SCNC: 5.8 MMOL/L
PROT SERPL-MCNC: 8.1 G/DL
RBC # BLD: 5.56 M/UL
RBC # FLD: 15.2 %
SODIUM SERPL-SCNC: 135 MMOL/L
TRIGL SERPL-MCNC: 79 MG/DL
WBC # FLD AUTO: 9.06 K/UL

## 2022-06-10 ENCOUNTER — APPOINTMENT (OUTPATIENT)
Dept: HEART AND VASCULAR | Facility: CLINIC | Age: 77
End: 2022-06-10

## 2022-06-10 ENCOUNTER — APPOINTMENT (OUTPATIENT)
Dept: HEART AND VASCULAR | Facility: CLINIC | Age: 77
End: 2022-06-10
Payer: COMMERCIAL

## 2022-06-10 PROCEDURE — 93306 TTE W/DOPPLER COMPLETE: CPT

## 2022-06-10 PROCEDURE — 36415 COLL VENOUS BLD VENIPUNCTURE: CPT

## 2022-06-12 LAB
ANION GAP SERPL CALC-SCNC: 13 MMOL/L
BUN SERPL-MCNC: 17 MG/DL
CALCIUM SERPL-MCNC: 10.2 MG/DL
CHLORIDE SERPL-SCNC: 100 MMOL/L
CO2 SERPL-SCNC: 20 MMOL/L
CREAT SERPL-MCNC: 1 MG/DL
EGFR: 78 ML/MIN/1.73M2
GLUCOSE SERPL-MCNC: 172 MG/DL
POTASSIUM SERPL-SCNC: 5.5 MMOL/L
SODIUM SERPL-SCNC: 133 MMOL/L

## 2022-06-14 ENCOUNTER — APPOINTMENT (OUTPATIENT)
Dept: HEART AND VASCULAR | Facility: CLINIC | Age: 77
End: 2022-06-14

## 2022-06-14 PROCEDURE — A9500: CPT

## 2022-06-14 PROCEDURE — 78452 HT MUSCLE IMAGE SPECT MULT: CPT

## 2022-06-14 PROCEDURE — 93306 TTE W/DOPPLER COMPLETE: CPT

## 2022-06-14 PROCEDURE — 93015 CV STRESS TEST SUPVJ I&R: CPT

## 2022-06-29 ENCOUNTER — APPOINTMENT (OUTPATIENT)
Dept: HEART AND VASCULAR | Facility: CLINIC | Age: 77
End: 2022-06-29
Payer: COMMERCIAL

## 2022-06-29 PROCEDURE — 36415 COLL VENOUS BLD VENIPUNCTURE: CPT

## 2022-06-30 LAB
ANION GAP SERPL CALC-SCNC: 10 MMOL/L
BUN SERPL-MCNC: 16 MG/DL
CALCIUM SERPL-MCNC: 9.8 MG/DL
CHLORIDE SERPL-SCNC: 100 MMOL/L
CO2 SERPL-SCNC: 22 MMOL/L
CREAT SERPL-MCNC: 0.96 MG/DL
EGFR: 81 ML/MIN/1.73M2
GLUCOSE SERPL-MCNC: 134 MG/DL
POTASSIUM SERPL-SCNC: 5.9 MMOL/L
SODIUM SERPL-SCNC: 132 MMOL/L

## 2022-07-13 ENCOUNTER — APPOINTMENT (OUTPATIENT)
Dept: HEART AND VASCULAR | Facility: CLINIC | Age: 77
End: 2022-07-13

## 2022-07-15 ENCOUNTER — APPOINTMENT (OUTPATIENT)
Dept: GASTROENTEROLOGY | Facility: CLINIC | Age: 77
End: 2022-07-15

## 2022-07-15 VITALS
HEART RATE: 68 BPM | TEMPERATURE: 98.1 F | BODY MASS INDEX: 19.99 KG/M2 | HEIGHT: 65 IN | RESPIRATION RATE: 16 BRPM | OXYGEN SATURATION: 96 % | WEIGHT: 120 LBS | SYSTOLIC BLOOD PRESSURE: 118 MMHG | DIASTOLIC BLOOD PRESSURE: 82 MMHG

## 2022-07-15 DIAGNOSIS — Z87.11 PERSONAL HISTORY OF PEPTIC ULCER DISEASE: ICD-10-CM

## 2022-07-15 DIAGNOSIS — Z01.812 ENCOUNTER FOR PREPROCEDURAL LABORATORY EXAMINATION: ICD-10-CM

## 2022-07-15 DIAGNOSIS — Z86.39 PERSONAL HISTORY OF OTHER ENDOCRINE, NUTRITIONAL AND METABOLIC DISEASE: ICD-10-CM

## 2022-07-15 DIAGNOSIS — Z87.19 PERSONAL HISTORY OF OTHER DISEASES OF THE DIGESTIVE SYSTEM: ICD-10-CM

## 2022-07-15 PROCEDURE — 99204 OFFICE O/P NEW MOD 45 MIN: CPT

## 2022-07-15 NOTE — PHYSICAL EXAM
[General Appearance - Alert] : alert [General Appearance - In No Acute Distress] : in no acute distress [Abdomen Soft] : soft [Abdomen Tenderness] : non-tender [Oriented To Time, Place, And Person] : oriented to person, place, and time [Impaired Insight] : insight and judgment were intact

## 2022-07-16 NOTE — ASSESSMENT
[FreeTextEntry1] : Patient is a 77 year old male with a history of ASHD, DMII, HLD, and SOB who presents due to referral from Dr. Franco Johnson for evaluation of abnormal CT chest.\par \par Patient to undergo upper endoscopy with possible endoscopic ultrasound and biopsy at [facility].  R/B/C of procedure discussed with patient.  COVID testing and escort for the procedure also reviewed.\par \par \par I, Lili Beaver; PA, am scribing for and in the presence of Dr. Cornell Bowie the following sections: history of present illness, past medical/family/social history; review of systems; vital signs; physical exam; disposition.\par \par DRISS, Cornell Bowie MD, personally performed the services described in the documentation, reviewed the documentation recorded by the scribe in my presence and it accurately and completely records my words and actions.	\par \par

## 2022-07-29 ENCOUNTER — TRANSCRIPTION ENCOUNTER (OUTPATIENT)
Age: 77
End: 2022-07-29

## 2022-08-01 ENCOUNTER — APPOINTMENT (OUTPATIENT)
Dept: GASTROENTEROLOGY | Facility: HOSPITAL | Age: 77
End: 2022-08-01

## 2022-08-01 ENCOUNTER — RESULT REVIEW (OUTPATIENT)
Age: 77
End: 2022-08-01

## 2022-08-01 ENCOUNTER — OUTPATIENT (OUTPATIENT)
Dept: OUTPATIENT SERVICES | Facility: HOSPITAL | Age: 77
LOS: 1 days | Discharge: HOME | End: 2022-08-01
Payer: COMMERCIAL

## 2022-08-01 DIAGNOSIS — Z95.1 PRESENCE OF AORTOCORONARY BYPASS GRAFT: Chronic | ICD-10-CM

## 2022-08-01 LAB
GLUCOSE BLDC GLUCOMTR-MCNC: 190 MG/DL — HIGH (ref 70–99)
SARS-COV-2 N GENE NPH QL NAA+PROBE: NOT DETECTED

## 2022-08-01 PROCEDURE — 43237 ENDOSCOPIC US EXAM ESOPH: CPT

## 2022-08-01 PROCEDURE — 43237 ENDOSCOPIC US EXAM ESOPH: CPT | Mod: GC

## 2022-08-01 PROCEDURE — 43239 EGD BIOPSY SINGLE/MULTIPLE: CPT | Mod: GC

## 2022-08-01 PROCEDURE — 43239 EGD BIOPSY SINGLE/MULTIPLE: CPT

## 2022-08-01 PROCEDURE — 82962 GLUCOSE BLOOD TEST: CPT

## 2022-08-01 PROCEDURE — 88305 TISSUE EXAM BY PATHOLOGIST: CPT | Mod: 26

## 2022-08-01 PROCEDURE — 88305 TISSUE EXAM BY PATHOLOGIST: CPT

## 2022-08-04 ENCOUNTER — APPOINTMENT (OUTPATIENT)
Dept: INTERNAL MEDICINE | Facility: CLINIC | Age: 77
End: 2022-08-04

## 2022-08-04 ENCOUNTER — APPOINTMENT (OUTPATIENT)
Dept: PULMONOLOGY | Facility: CLINIC | Age: 77
End: 2022-08-04

## 2022-08-04 ENCOUNTER — NON-APPOINTMENT (OUTPATIENT)
Age: 77
End: 2022-08-04

## 2022-08-04 VITALS
WEIGHT: 120 LBS | HEART RATE: 92 BPM | SYSTOLIC BLOOD PRESSURE: 108 MMHG | OXYGEN SATURATION: 94 % | BODY MASS INDEX: 19.99 KG/M2 | DIASTOLIC BLOOD PRESSURE: 70 MMHG | TEMPERATURE: 97.3 F | HEIGHT: 65 IN

## 2022-08-04 VITALS
WEIGHT: 122 LBS | SYSTOLIC BLOOD PRESSURE: 122 MMHG | OXYGEN SATURATION: 96 % | TEMPERATURE: 98 F | BODY MASS INDEX: 20.33 KG/M2 | DIASTOLIC BLOOD PRESSURE: 71 MMHG | HEART RATE: 84 BPM | RESPIRATION RATE: 16 BRPM | HEIGHT: 65 IN

## 2022-08-04 DIAGNOSIS — R07.9 CHEST PAIN, UNSPECIFIED: ICD-10-CM

## 2022-08-04 DIAGNOSIS — Z00.00 ENCOUNTER FOR GENERAL ADULT MEDICAL EXAMINATION W/OUT ABNORMAL FINDINGS: ICD-10-CM

## 2022-08-04 LAB — SURGICAL PATHOLOGY STUDY: SIGNIFICANT CHANGE UP

## 2022-08-04 PROCEDURE — G0009: CPT

## 2022-08-04 PROCEDURE — 90677 PCV20 VACCINE IM: CPT

## 2022-08-04 PROCEDURE — 71046 X-RAY EXAM CHEST 2 VIEWS: CPT

## 2022-08-04 PROCEDURE — 99214 OFFICE O/P EST MOD 30 MIN: CPT | Mod: 25

## 2022-08-04 PROCEDURE — 99397 PER PM REEVAL EST PAT 65+ YR: CPT | Mod: 25

## 2022-08-04 PROCEDURE — 36415 COLL VENOUS BLD VENIPUNCTURE: CPT

## 2022-08-04 RX ORDER — ESCITALOPRAM OXALATE 10 MG/1
10 TABLET ORAL
Qty: 90 | Refills: 2 | Status: DISCONTINUED | COMMUNITY
Start: 2022-03-09 | End: 2022-08-04

## 2022-08-04 NOTE — ASSESSMENT
[FreeTextEntry1] : Data reviewed:\par \par CT 3/2022 [facility] personally reviewed : stable fibrotic disease\par PA/lat CXR in office 8/4/22: diffuse fibrotic disease, inc density on L is old\par \par PFT 5/24/22: FVC 1.35L (42%), FEV1 1.28L (57%), TLC 2.18L (37%), DLCO x / FENO 44 / 264m desat to 84%\par \par Impression:\par Acute bronchitis\par Post-Covid ILD\par \par Plan:\par Z pack for bronchitis.\par Disability forms completed.

## 2022-08-04 NOTE — HISTORY OF PRESENT ILLNESS
[TextBox_4] : 07/15/2020: Asked to evaluate patient by Dr Johnson for dyspnea. He has coronary disease. 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 3 mos ago he had Covid - fever, myalgias, cough, no dyspnea, isolated at home, not admitted. Now Ab positive. Returned to work last month and now is dyspneic with walking. Not dyspneic at rest. Never had lung disease. Quit smoking in his 40s. Lives in [place], born in [facility].\par \par 8/6/20: Feels better than last visit. Not working but walking 30-60 min a day, w dyspnea. No other changes. Blood glucose is well controlled. Started him on 30mg of prednisone for the nonresolving infiltrates w exertional hypoxia.\par \par 8/20/20: Feels a little better on 30mg prednisone. Blood glucose is up. No other changes. Taking Nexium, no GI symptoms.\par \par 9/30/20 [Raymundo]: on Prednisone 10 mg /day now, no worsening of SOB / cough, leg swelling is better, DM -2 with elevated BG in 300-400, not on insulin. taking nexium. feels lethargic sometimes. \par \par 11/25/20 [Raymundo]: here for follow up. Had stopped steroids about 45 days ago, has been walking 3-5 k steps a day, not GAMBINO if he walks with slower pace but dyspenic on exertion,occasional cough with clear sputum if exposed to dust, No chest pain / fever. He plans to go back to work in a week. many questions regarding prevention of another infection of COVID, need for prophylactic antibiotics. \par \par 10/1/21: For some logistics reason unable to get follow up CT as planned. He is working, but is very dyspneic, very tiring, working full time. He can't retire because his daughter has been accepted to medical school. Had the Covid vaccines.\par \par 5/19/22: Daughter 2nd year med student at [employer] - he's not paying, she's taking loans. He is still working. He did online rehab without much benefit. He is very dyspneic. WHen he gets very dyspneic he also coughs. All of this is a problem at work. 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 Feels he needs to work for another 2 years.\par \par 8/4/22: Walks in asking to be seen for L chest tightness and painful cough w congestion x 3 days. Had endoscopy Monday 4 days ago. No fever, no increased dyspnea. No URI symptoms. Flonase helping. He is going to go out on disability for 6 mos and he thinks he will go back after that.

## 2022-08-04 NOTE — END OF VISIT
[FreeTextEntry3] : 76 yo male with hx HTN, CAD s/p stents, covid, here for f/u, c/o GAMBINO, and for CPE\par \par 1) GAMBINO - following with pulm and cards, likely 2/2 covid ILD, but also has upcoming stress test\par 2) esphageal wall thickening, hiatal hernia - f/u GI\par 3) covid fibrosis - stable, reviewed pulm note\par 4) hyperK - recheck today\par 5) HCM - had covid vaccines\par cscope UTD

## 2022-08-04 NOTE — HISTORY OF PRESENT ILLNESS
[FreeTextEntry1] : JOSE MIGUEL & MAKI [de-identified] : 77M with a history of ASHD, DMII, HLD, CAD s/p stents to LCX and LAD, and SOB on exertion. Works as a pharmacist at [employer], working on receiving leave of absence (via Dr. Abarca) due to SOB i/s/o COVID lung with known bibasilar fibrotic changes. Patient recently underwent CTAP + chest which showed no interim change in lung fibrosis, small hiatal hernia with distal esophageal wall thickening. Patient subsequently underwent EGD 8/1 showing 3mm hernia, awaiting pathology results. \par \par HCM: colonoscopy 7 years ago WNL, tetanus VX up to date, completed shingles Vx course 2017 COVID Vx x3, PNA Vx complete.

## 2022-08-04 NOTE — PHYSICAL EXAM
[No Acute Distress] : no acute distress [Normal Rate/Rhythm] : normal rate/rhythm [Normal S1, S2] : normal s1, s2 [No Murmurs] : no murmurs [TextBox_68] : cb crackles

## 2022-08-04 NOTE — HEALTH RISK ASSESSMENT
[Former] : Former [No] : In the past 12 months have you used drugs other than those required for medical reasons? No [No falls in past year] : Patient reported no falls in the past year [0] : 1) Little interest or pleasure doing things: Not at all (0) [1] : 2) Feeling down, depressed, or hopeless for several days (1) [PHQ-2 Negative - No further assessment needed] : PHQ-2 Negative - No further assessment needed [Patient reported colonoscopy was normal] : Patient reported colonoscopy was normal [Fully functional (bathing, dressing, toileting, transferring, walking, feeding)] : Fully functional (bathing, dressing, toileting, transferring, walking, feeding) [Fully functional (using the telephone, shopping, preparing meals, housekeeping, doing laundry, using] : Fully functional and needs no help or supervision to perform IADLs (using the telephone, shopping, preparing meals, housekeeping, doing laundry, using transportation, managing medications and managing finances) [de-identified] : occassional use for 2 years  [YearQuit] : 1962 [Audit-CScore] : 0 [[id number]] : 1 [Change in mental status noted] : No change in mental status noted [ColonoscopyDate] : 2015

## 2022-08-04 NOTE — REVIEW OF SYSTEMS
[Shortness Of Breath] : shortness of breath [Cough] : cough [Dyspnea on Exertion] : dyspnea on exertion [Depression] : depression [Negative] : Neurological [Wheezing] : no wheezing [FreeTextEntry6] : SOB occurs with exertion. Max METs >4 (can climb 3 flights of stairs)

## 2022-08-04 NOTE — PHYSICAL EXAM
[No Accessory Muscle Use] : no accessory muscle use [Normal Rate] : normal rate  [Regular Rhythm] : with a regular rhythm [Normal S1, S2] : normal S1 and S2 [Normal] : affect was normal and insight and judgment were intact [de-identified] : Fine bibasilar crackles [de-identified] : II/VI systolic murmur at LUSB

## 2022-08-05 ENCOUNTER — NON-APPOINTMENT (OUTPATIENT)
Age: 77
End: 2022-08-05

## 2022-08-09 LAB
ALBUMIN SERPL ELPH-MCNC: 4 G/DL
ALP BLD-CCNC: 78 U/L
ALT SERPL-CCNC: 16 U/L
ANION GAP SERPL CALC-SCNC: 11 MMOL/L
AST SERPL-CCNC: 21 U/L
BILIRUB SERPL-MCNC: 1 MG/DL
BUN SERPL-MCNC: 35 MG/DL
CALCIUM SERPL-MCNC: 9.3 MG/DL
CHLORIDE SERPL-SCNC: 98 MMOL/L
CO2 SERPL-SCNC: 21 MMOL/L
CREAT SERPL-MCNC: 1.05 MG/DL
EGFR: 73 ML/MIN/1.73M2
GLUCOSE SERPL-MCNC: 181 MG/DL
POTASSIUM SERPL-SCNC: 5.4 MMOL/L
PROT SERPL-MCNC: 8.1 G/DL
SODIUM SERPL-SCNC: 130 MMOL/L

## 2022-08-15 ENCOUNTER — RX RENEWAL (OUTPATIENT)
Age: 77
End: 2022-08-15

## 2022-08-24 ENCOUNTER — APPOINTMENT (OUTPATIENT)
Dept: NEPHROLOGY | Facility: CLINIC | Age: 77
End: 2022-08-24

## 2022-08-24 VITALS
WEIGHT: 122 LBS | HEART RATE: 76 BPM | BODY MASS INDEX: 20.33 KG/M2 | DIASTOLIC BLOOD PRESSURE: 63 MMHG | HEIGHT: 65 IN | SYSTOLIC BLOOD PRESSURE: 108 MMHG

## 2022-08-24 DIAGNOSIS — Z87.891 PERSONAL HISTORY OF NICOTINE DEPENDENCE: ICD-10-CM

## 2022-08-24 DIAGNOSIS — R60.0 LOCALIZED EDEMA: ICD-10-CM

## 2022-08-24 DIAGNOSIS — L03.90 CELLULITIS, UNSPECIFIED: ICD-10-CM

## 2022-08-24 DIAGNOSIS — I73.9 PERIPHERAL VASCULAR DISEASE, UNSPECIFIED: ICD-10-CM

## 2022-08-24 DIAGNOSIS — Z87.09 PERSONAL HISTORY OF OTHER DISEASES OF THE RESPIRATORY SYSTEM: ICD-10-CM

## 2022-08-24 PROCEDURE — 99204 OFFICE O/P NEW MOD 45 MIN: CPT

## 2022-08-24 NOTE — ASSESSMENT
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  Our goal is to maintain the irbesartan at this dose and control his hyperkalemia conservatively.  I am starting him on sodium bicarb  650 mg twice daily.  I have ordered labs to be done today to include U ACR, BMP, Cystatin C, serum aldosterone and plasma potassium.  We will screen for hyperaldosteronism, which is common in longstanding type 2 diabetes.  We will also try to rule out pseudohyperkalemia with the measurement of plasma potassium.  If necessary, we can utilize Veltassa or Lokelma, but we may be able to manage without that.

## 2022-08-24 NOTE — HISTORY OF PRESENT ILLNESS
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  He has a 35 to 40-year history of type 2 diabetes, moderately well controlled (last A1c was 7.4), CAD status post PTI and CABG. his meds include irbesartan 300 mg daily, carvedilol 6.25 mg twice daily -he previously was on amlodipine 10 mg until about 6 months ago.  Kayexalate was prescribed previously but never received.  His BP was 122/71 on August 4 when he saw Dr. Cunha.  He does not have diabetic neuropathy or retinopathy.

## 2022-08-25 ENCOUNTER — NON-APPOINTMENT (OUTPATIENT)
Age: 77
End: 2022-08-25

## 2022-08-25 LAB
ALDOSTERONE SERUM: 6.3 NG/DL
ANION GAP SERPL CALC-SCNC: 11 MMOL/L
BUN SERPL-MCNC: 23 MG/DL
CALCIUM SERPL-MCNC: 10 MG/DL
CHLORIDE SERPL-SCNC: 101 MMOL/L
CO2 SERPL-SCNC: 23 MMOL/L
CREAT SERPL-MCNC: 0.94 MG/DL
CREAT SPEC-SCNC: 32 MG/DL
CYSTATIN C SERPL-MCNC: 1.44 MG/L
EGFR: 83 ML/MIN/1.73M2
GFR/BSA.PRED SERPLBLD CYS-BASED-ARV: 45 ML/MIN/1.73M2
GLUCOSE SERPL-MCNC: 197 MG/DL
MICROALBUMIN 24H UR DL<=1MG/L-MCNC: <1.2 MG/DL
MICROALBUMIN/CREAT 24H UR-RTO: NORMAL MG/G
POTASSIUM SERPL-SCNC: 5.7 MMOL/L
POTASSIUM SERPL-SCNC: 5.9 MMOL/L
SODIUM SERPL-SCNC: 135 MMOL/L

## 2022-09-15 ENCOUNTER — APPOINTMENT (OUTPATIENT)
Dept: INTERNAL MEDICINE | Facility: CLINIC | Age: 77
End: 2022-09-15

## 2022-09-15 VITALS
WEIGHT: 122 LBS | HEART RATE: 75 BPM | BODY MASS INDEX: 20.33 KG/M2 | HEIGHT: 65 IN | OXYGEN SATURATION: 95 % | SYSTOLIC BLOOD PRESSURE: 116 MMHG | TEMPERATURE: 98.2 F | DIASTOLIC BLOOD PRESSURE: 63 MMHG

## 2022-09-15 PROCEDURE — 99214 OFFICE O/P EST MOD 30 MIN: CPT | Mod: 25,GC

## 2022-09-15 PROCEDURE — 36415 COLL VENOUS BLD VENIPUNCTURE: CPT

## 2022-10-13 LAB
ANION GAP SERPL CALC-SCNC: 10 MMOL/L
BUN SERPL-MCNC: 21 MG/DL
CALCIUM SERPL-MCNC: 9.9 MG/DL
CHLORIDE SERPL-SCNC: 98 MMOL/L
CO2 SERPL-SCNC: 23 MMOL/L
CREAT SERPL-MCNC: 0.9 MG/DL
EGFR: 88 ML/MIN/1.73M2
GLUCOSE SERPL-MCNC: 116 MG/DL
POTASSIUM SERPL-SCNC: 5.5 MMOL/L
POTASSIUM SERPL-SCNC: 5.6 MMOL/L
SODIUM SERPL-SCNC: 132 MMOL/L

## 2022-10-17 LAB
ESTIMATED AVERAGE GLUCOSE: 197 MG/DL
HBA1C MFR BLD HPLC: 8.5 %
POTASSIUM SERPL-SCNC: 5.9 MMOL/L

## 2022-10-18 ENCOUNTER — APPOINTMENT (OUTPATIENT)
Dept: NEPHROLOGY | Facility: CLINIC | Age: 77
End: 2022-10-18

## 2022-10-18 VITALS
SYSTOLIC BLOOD PRESSURE: 108 MMHG | DIASTOLIC BLOOD PRESSURE: 63 MMHG | HEART RATE: 76 BPM | BODY MASS INDEX: 20.33 KG/M2 | HEIGHT: 65 IN | WEIGHT: 122 LBS

## 2022-10-18 DIAGNOSIS — R26.81 UNSTEADINESS ON FEET: ICD-10-CM

## 2022-10-18 DIAGNOSIS — E87.2 ACIDOSIS: ICD-10-CM

## 2022-10-18 PROCEDURE — 99214 OFFICE O/P EST MOD 30 MIN: CPT

## 2022-10-18 NOTE — ASSESSMENT
[FreeTextEntry1] : 77-year-old man with a history of hypertension, type 2 diabetes, CAD/CABG -he is hyponatremic and hyperkalemic.  He will start Veltassa 8.5 g Monday Wednesday Friday beginning tomorrow.  I have ordered labs to be done in 3 to 4 weeks to include BMP, plasma potassium, serum osmolality, random urine osmolality and sodium, TSH, uric acid.  I believe his hyperkalemia is largely related to use of irbesartan and beta-blockade.  Also diabetics frequently run a higher potassium, often related to type IV RTA with hyporeninemic hypoaldosteronism.  He is interested in starting PT to strengthen his legs and improve his gait.  He will return in 3 to 4 months if all is stable.

## 2022-10-18 NOTE — HISTORY OF PRESENT ILLNESS
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  His K consistently ran 5.4-5.9 in the absence of impaired kidney function, or causes of pseudohyperkalemia such as thrombocytosis or hemolysis.  He does have a 35 to 40-year history of type 2 diabetes which has generally been in fairly good control.  He is on ARB in the form of irbesartan 300 mg as well as beta-blocker , carvedilol 6.25 mg twice daily.  Both can raise potassium.  His BP has been well controlled.  His latest labs from this week show a sodium of 132, K of 5.6, CO2 of 23, creatinine 0.99 with a BUN of 21, GFR 88, and plasma potassium of 5.5.  I prescribed Veltassa 8.4 g Monday Wednesday Friday which he will start tomorrow.  His fluid intake is quite large.

## 2022-10-21 ENCOUNTER — TRANSCRIPTION ENCOUNTER (OUTPATIENT)
Age: 77
End: 2022-10-21

## 2022-10-21 ENCOUNTER — NON-APPOINTMENT (OUTPATIENT)
Age: 77
End: 2022-10-21

## 2022-10-24 ENCOUNTER — APPOINTMENT (OUTPATIENT)
Dept: INTERNAL MEDICINE | Facility: CLINIC | Age: 77
End: 2022-10-24

## 2022-10-24 DIAGNOSIS — K22.9 DISEASE OF ESOPHAGUS, UNSPECIFIED: ICD-10-CM

## 2022-10-24 PROCEDURE — 99213 OFFICE O/P EST LOW 20 MIN: CPT | Mod: GC

## 2022-10-24 NOTE — END OF VISIT
[] : Resident [FreeTextEntry3] : \par 76 yo m w/ ho diabetes, htn, cad s/p stent, pulm fibrosis 2/2 covid, here for htn\par \par #HTN- okay cont med\par #hyperkalemia- f/u renal. started on new med recently\par #ILD- has some shortness of breath with exertion. been resting at home. not ready to return to work yet. fill out disability forms for 3 months while he does pulm rehab to recover more \par #diabetes- poorly controlled- needs to work on this and recheck later\par #esophageal thickening- maybe had egd in august- ask gi for result\par \par

## 2022-10-24 NOTE — PHYSICAL EXAM
[No Acute Distress] : no acute distress [Well Nourished] : well nourished [Well Developed] : well developed [Well-Appearing] : well-appearing [Normal Sclera/Conjunctiva] : normal sclera/conjunctiva [PERRL] : pupils equal round and reactive to light [EOMI] : extraocular movements intact [Normal Outer Ear/Nose] : the outer ears and nose were normal in appearance [Normal Oropharynx] : the oropharynx was normal [No JVD] : no jugular venous distention [No Lymphadenopathy] : no lymphadenopathy [Supple] : supple [Thyroid Normal, No Nodules] : the thyroid was normal and there were no nodules present [No Respiratory Distress] : no respiratory distress  [No Accessory Muscle Use] : no accessory muscle use [Normal Rate] : normal rate  [Regular Rhythm] : with a regular rhythm [Normal S1, S2] : normal S1 and S2 [No Murmur] : no murmur heard [No Carotid Bruits] : no carotid bruits [No Abdominal Bruit] : a ~M bruit was not heard ~T in the abdomen [No Varicosities] : no varicosities [Pedal Pulses Present] : the pedal pulses are present [No Edema] : there was no peripheral edema [No Palpable Aorta] : no palpable aorta [No Extremity Clubbing/Cyanosis] : no extremity clubbing/cyanosis [Soft] : abdomen soft [Non Tender] : non-tender [Non-distended] : non-distended [No Masses] : no abdominal mass palpated [No HSM] : no HSM [Normal Bowel Sounds] : normal bowel sounds [Normal Posterior Cervical Nodes] : no posterior cervical lymphadenopathy [Normal Anterior Cervical Nodes] : no anterior cervical lymphadenopathy [No CVA Tenderness] : no CVA  tenderness [No Spinal Tenderness] : no spinal tenderness [No Joint Swelling] : no joint swelling [Grossly Normal Strength/Tone] : grossly normal strength/tone [No Rash] : no rash [Coordination Grossly Intact] : coordination grossly intact [No Focal Deficits] : no focal deficits [Normal Gait] : normal gait [Deep Tendon Reflexes (DTR)] : deep tendon reflexes were 2+ and symmetric [Normal Affect] : the affect was normal [Normal Insight/Judgement] : insight and judgment were intact [de-identified] : Rhonchi bilaterally

## 2022-10-24 NOTE — HISTORY OF PRESENT ILLNESS
[FreeTextEntry1] : Filling out the disability form- lung fibrosis [de-identified] : 77 y.o M w/ PMHx of HTN, DM2, CAD s/p stenting, dyspnea2/2 pul fibrosis presented for filling out the disability form. Pt claimed to have had COVID in March of 2020 and since then has been suffering from dyspnea which has limited his ability to work. Per pt the dyspnea has improved but still requires f/u with pulmonologist and pulm rehab for further improvement. ROS otherwise negative on this visit.

## 2022-10-24 NOTE — ASSESSMENT
[FreeTextEntry1] : 77 y.o M w/ PMHx of HTN, DM2, CAD s/p stenting, dyspnea2/2 pul fibrosis presented for filling out the disability form. Pt claimed to have had COVID in March of 2020 and since then has been suffering from dyspnea which has limited his ability to work. Per pt the dyspnea has improved but still requires f/u with pulmonologist and pulm rehab for further improvement\par \par # Type II DM\par A1C 8.5%, pt to continue with home jardiance 25PO QD and meformin XR 1g PO BID\par ordered A1C\par \par # CAD\par s/p Stents to LCX and LAD, follows Dr. Johnson. On Rosuvastatin 10 mg PO QD, Carvedilol 6.25 PO BID\par \par # HTN\par Carvedilol 6.25 PO BID, Irbesartan 300mg PO QD

## 2022-11-02 ENCOUNTER — APPOINTMENT (OUTPATIENT)
Dept: HEART AND VASCULAR | Facility: CLINIC | Age: 77
End: 2022-11-02

## 2022-11-02 VITALS
BODY MASS INDEX: 19.83 KG/M2 | HEART RATE: 85 BPM | DIASTOLIC BLOOD PRESSURE: 60 MMHG | TEMPERATURE: 97.5 F | OXYGEN SATURATION: 96 % | HEIGHT: 65 IN | WEIGHT: 119 LBS | SYSTOLIC BLOOD PRESSURE: 112 MMHG

## 2022-11-02 PROCEDURE — 99214 OFFICE O/P EST MOD 30 MIN: CPT

## 2022-11-02 PROCEDURE — 36415 COLL VENOUS BLD VENIPUNCTURE: CPT

## 2022-11-02 RX ORDER — AZITHROMYCIN 250 MG/1
250 TABLET, FILM COATED ORAL
Qty: 1 | Refills: 0 | Status: DISCONTINUED | COMMUNITY
Start: 2022-08-04 | End: 2022-11-02

## 2022-11-03 NOTE — ASSESSMENT
[FreeTextEntry1] : dizzines is gone his bp is controlled on irbesartan\par his soboe due to covid lung he has severely diminished exercise capacity \par he has lung scar since due to covid for pulm rehab\par CAD on rosuvastatin 10 mg daily intolerant of higher doses\par \par fu in three months\par \par \par \par \par

## 2022-11-03 NOTE — PROCEDURE
[Tc-99m, sestamibi-Cardiolite, to 40mCi, dose-Radionuclides-[employer] code--v.1-Active-Trade] : Tc-99m, sestamibi-Cardiolite, to 40mCi, dose-Radionuclides-[employer] code--v.1-Active-Trade [ECG Atrial Arrhythmias] : no arrhythmias [de-identified] : Given via the IV route.    1 Day Protocol. Rest/Stress. SPECT. Gated. [de-identified] : 10.4 mCi [de-identified] : Given via the IV route.  Injection time and Heart Rate [de-identified] : 28.7 [de-identified] : 118 [de-identified] : 160/56 [de-identified] : occsaional PVCs, 1 couplet [de-identified] : 4.7 METS; 81 [de-identified] : 69 [de-identified] : Image Quality: Excellent\par Artifacts: None\par Comparison to prior study: N/A  [TWNoteComboBox1] : DAVID [TWNoteComboBox2] : Fabricio [TWNoteComboBox4] : dyspnea [TWNoteComboBox3] : 1 [TWNoteComboBox5] : dyspnea [Normal] : 3. No pericardial effusion. [de-identified] : Dr. Franco Johnson (card) [de-identified] : Nicole Petri-Schoener, ENOCCS [de-identified] : shortness of breath [de-identified] : 1.1 [de-identified] : 0.9 [de-identified] : 1.8 [de-identified] : 3.7 [de-identified] : 3.0 [de-identified] : 33 mmHg [de-identified] : 3.3 [de-identified] : 60-63 [de-identified] : Mild concentric left ventricular hypertrophy. Age appropriate diastolic function [FreeTextEntry1] : There is mild  posterior mitral annular calcification. There is minimal mitral regurgitation. There is no mitral stenosis. [de-identified] : The left atrium is normal in size. The left atrial volume index is 23 ml/m2. [de-identified] : There is trace pulmonic insufficiency. [de-identified] : There is minimal tricuspid regurgitation. [de-identified] : The inferior vena cava measures 1.2 cm. [de-identified] : Mitral annular calcification

## 2022-11-03 NOTE — PHYSICAL EXAM
[Well Developed] : well developed [Well Nourished] : well nourished [No Acute Distress] : no acute distress [Normal Venous Pressure] : normal venous pressure [No Carotid Bruit] : no carotid bruit [Normal S1, S2] : normal S1, S2 [No Murmur] : no murmur [No Rub] : no rub [No Gallop] : no gallop [Clear Lung Fields] : clear lung fields [Good Air Entry] : good air entry [No Respiratory Distress] : no respiratory distress  [Soft] : abdomen soft [Non Tender] : non-tender [No Masses/organomegaly] : no masses/organomegaly [Normal Bowel Sounds] : normal bowel sounds [Normal Gait] : normal gait [No Edema] : no edema [No Cyanosis] : no cyanosis [No Clubbing] : no clubbing [No Varicosities] : no varicosities [No Rash] : no rash [No Skin Lesions] : no skin lesions [Moves all extremities] : moves all extremities [No Focal Deficits] : no focal deficits [Normal Speech] : normal speech [Alert and Oriented] : alert and oriented [Normal memory] : normal memory [General Appearance - Well Developed] : well developed [Normal Appearance] : normal appearance [Well Groomed] : well groomed [General Appearance - Well Nourished] : well nourished [No Deformities] : no deformities [General Appearance - In No Acute Distress] : no acute distress [Normal Conjunctiva] : the conjunctiva exhibited no abnormalities [Eyelids - No Xanthelasma] : the eyelids demonstrated no xanthelasmas [Normal Oral Mucosa] : normal oral mucosa [No Oral Pallor] : no oral pallor [No Oral Cyanosis] : no oral cyanosis [Normal Jugular Venous A Waves Present] : normal jugular venous A waves present [Normal Jugular Venous V Waves Present] : normal jugular venous V waves present [No Jugular Venous Wise A Waves] : no jugular venous wise A waves [Respiration, Rhythm And Depth] : normal respiratory rhythm and effort [Exaggerated Use Of Accessory Muscles For Inspiration] : no accessory muscle use [Auscultation Breath Sounds / Voice Sounds] : lungs were clear to auscultation bilaterally [FreeTextEntry1] : right lung crackles [Heart Rate And Rhythm] : heart rate and rhythm were normal [Heart Sounds] : normal S1 and S2 [Murmurs] : no murmurs present [Abdomen Soft] : soft [Abdomen Tenderness] : non-tender [Abdomen Mass (___ Cm)] : no abdominal mass palpated [Abnormal Walk] : normal gait [Gait - Sufficient For Exercise Testing] : the gait was sufficient for exercise testing [Nail Clubbing] : no clubbing of the fingernails [Cyanosis, Localized] : no localized cyanosis [Petechial Hemorrhages (___cm)] : no petechial hemorrhages [Skin Color & Pigmentation] : normal skin color and pigmentation [] : no rash [No Venous Stasis] : no venous stasis [Skin Lesions] : no skin lesions [No Skin Ulcers] : no skin ulcer [No Xanthoma] : no  xanthoma was observed [Oriented To Time, Place, And Person] : oriented to person, place, and time [Affect] : the affect was normal [Mood] : the mood was normal [No Anxiety] : not feeling anxious

## 2022-11-03 NOTE — HISTORY OF PRESENT ILLNESS
[FreeTextEntry1] : 77 M LIMA to LAD, stent to LCX and LAD HTN DM PAD calcified arteries on SYL/PVR GI bleed COVID Lung hyperkalemia\par \par \par here for fu has stable sob he sob has not improved he is now in a leave of absence he has no cp no other assoc cardic complaints \par \par \par \par ecg sr rbbb 5/27/2020\par echo 11/3/2021 EF normal PASP 60-65%\par stress test 1/2021 4.7 METS small reversible apical defect

## 2022-11-15 ENCOUNTER — APPOINTMENT (OUTPATIENT)
Dept: HEART AND VASCULAR | Facility: CLINIC | Age: 77
End: 2022-11-15

## 2022-11-21 ENCOUNTER — APPOINTMENT (OUTPATIENT)
Dept: NEPHROLOGY | Facility: CLINIC | Age: 77
End: 2022-11-21

## 2022-12-12 ENCOUNTER — APPOINTMENT (OUTPATIENT)
Dept: NEPHROLOGY | Facility: CLINIC | Age: 77
End: 2022-12-12

## 2022-12-12 VITALS
DIASTOLIC BLOOD PRESSURE: 63 MMHG | HEIGHT: 65 IN | SYSTOLIC BLOOD PRESSURE: 112 MMHG | WEIGHT: 119 LBS | BODY MASS INDEX: 19.83 KG/M2 | HEART RATE: 80 BPM

## 2022-12-12 PROCEDURE — 99214 OFFICE O/P EST MOD 30 MIN: CPT

## 2022-12-12 NOTE — HISTORY OF PRESENT ILLNESS
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  There are 3 contributing factors including type 2 diabetes for many years, use of irbesartan 300 mg daily, as well as a beta-blocker.  His last K was 5.5 in October.  There is no significant pseudohyperkalemia based on plasma potassiums.  His renal function remains normal with a GFR in the 80s.  He does not use NSAIDs or take potassium supplements.  His potassium intake is not excessive by history.  His daughter is a medical student at [employer] in [place].  His BP was 112/60 when he saw Dr. Johnson on November 2.  His most recent A1c was 8.5 in October.  He had a normal EKG stress test, but abnormal imaging on nuclear.  His shortness of breath is currently interpreted as a post COVID sequela.

## 2022-12-12 NOTE — ASSESSMENT
[FreeTextEntry1] : 77-year-old man with hypertension and type 2 diabetes, who has mild hyperkalemia chronically in the face of normal renal function.  His K is tolerable and is not currently a reason to stop his ARB or beta-blocker.  I have ordered labs to be repeated, to include A1c, U ACR, BMP, Cystatin C, and plasma potassium.  If his K is significantly higher than recent ones, we might have to temporarily hold irbesartan while utilizing a potassium binder, but I hope to avoid that.  He will return in 4 months.

## 2023-01-01 ENCOUNTER — APPOINTMENT (OUTPATIENT)
Dept: GASTROENTEROLOGY | Facility: CLINIC | Age: 78
End: 2023-01-01

## 2023-01-01 ENCOUNTER — TRANSCRIPTION ENCOUNTER (OUTPATIENT)
Age: 78
End: 2023-01-01

## 2023-01-01 ENCOUNTER — APPOINTMENT (OUTPATIENT)
Dept: INTERNAL MEDICINE | Facility: CLINIC | Age: 78
End: 2023-01-01
Payer: MEDICARE

## 2023-01-01 ENCOUNTER — APPOINTMENT (OUTPATIENT)
Dept: PULMONOLOGY | Facility: CLINIC | Age: 78
End: 2023-01-01

## 2023-01-01 ENCOUNTER — OUTPATIENT (OUTPATIENT)
Dept: OUTPATIENT SERVICES | Facility: HOSPITAL | Age: 78
LOS: 1 days | End: 2023-01-01
Payer: MEDICARE

## 2023-01-01 ENCOUNTER — APPOINTMENT (OUTPATIENT)
Dept: HEART AND VASCULAR | Facility: CLINIC | Age: 78
End: 2023-01-01
Payer: MEDICARE

## 2023-01-01 ENCOUNTER — APPOINTMENT (OUTPATIENT)
Dept: INTERNAL MEDICINE | Facility: CLINIC | Age: 78
End: 2023-01-01

## 2023-01-01 ENCOUNTER — APPOINTMENT (OUTPATIENT)
Dept: CARE COORDINATION | Facility: HOME HEALTH | Age: 78
End: 2023-01-01
Payer: MEDICARE

## 2023-01-01 ENCOUNTER — NON-APPOINTMENT (OUTPATIENT)
Age: 78
End: 2023-01-01

## 2023-01-01 ENCOUNTER — APPOINTMENT (OUTPATIENT)
Dept: GASTROENTEROLOGY | Facility: CLINIC | Age: 78
End: 2023-01-01
Payer: MEDICARE

## 2023-01-01 ENCOUNTER — APPOINTMENT (OUTPATIENT)
Dept: PULMONOLOGY | Facility: CLINIC | Age: 78
End: 2023-01-01
Payer: MEDICARE

## 2023-01-01 ENCOUNTER — OUTPATIENT (OUTPATIENT)
Dept: OUTPATIENT SERVICES | Facility: HOSPITAL | Age: 78
LOS: 1 days | End: 2023-01-01
Payer: COMMERCIAL

## 2023-01-01 ENCOUNTER — APPOINTMENT (OUTPATIENT)
Dept: NEPHROLOGY | Facility: CLINIC | Age: 78
End: 2023-01-01

## 2023-01-01 ENCOUNTER — APPOINTMENT (OUTPATIENT)
Dept: ULTRASOUND IMAGING | Facility: HOSPITAL | Age: 78
End: 2023-01-01
Payer: MEDICARE

## 2023-01-01 ENCOUNTER — RX RENEWAL (OUTPATIENT)
Age: 78
End: 2023-01-01

## 2023-01-01 ENCOUNTER — APPOINTMENT (OUTPATIENT)
Dept: OTOLARYNGOLOGY | Facility: CLINIC | Age: 78
End: 2023-01-01
Payer: MEDICARE

## 2023-01-01 ENCOUNTER — APPOINTMENT (OUTPATIENT)
Dept: NEPHROLOGY | Facility: CLINIC | Age: 78
End: 2023-01-01
Payer: MEDICARE

## 2023-01-01 ENCOUNTER — APPOINTMENT (OUTPATIENT)
Dept: PULMONOLOGY | Facility: CLINIC | Age: 78
End: 2023-01-01
Payer: COMMERCIAL

## 2023-01-01 ENCOUNTER — APPOINTMENT (OUTPATIENT)
Dept: MRI IMAGING | Facility: HOSPITAL | Age: 78
End: 2023-01-01

## 2023-01-01 ENCOUNTER — APPOINTMENT (OUTPATIENT)
Dept: HEART AND VASCULAR | Facility: CLINIC | Age: 78
End: 2023-01-01

## 2023-01-01 ENCOUNTER — INPATIENT (INPATIENT)
Facility: HOSPITAL | Age: 78
LOS: 3 days | Discharge: HOME | DRG: 871 | End: 2023-11-24
Attending: STUDENT IN AN ORGANIZED HEALTH CARE EDUCATION/TRAINING PROGRAM | Admitting: INTERNAL MEDICINE
Payer: MEDICARE

## 2023-01-01 ENCOUNTER — APPOINTMENT (OUTPATIENT)
Dept: RADIOLOGY | Facility: HOSPITAL | Age: 78
End: 2023-01-01
Payer: MEDICARE

## 2023-01-01 ENCOUNTER — OUTPATIENT (OUTPATIENT)
Dept: OUTPATIENT SERVICES | Facility: HOSPITAL | Age: 78
LOS: 1 days | End: 2023-01-01

## 2023-01-01 ENCOUNTER — APPOINTMENT (OUTPATIENT)
Dept: INTERNAL MEDICINE | Facility: CLINIC | Age: 78
End: 2023-01-01
Payer: COMMERCIAL

## 2023-01-01 ENCOUNTER — APPOINTMENT (OUTPATIENT)
Dept: HEART AND VASCULAR | Facility: CLINIC | Age: 78
End: 2023-01-01
Payer: COMMERCIAL

## 2023-01-01 VITALS
HEART RATE: 120 BPM | SYSTOLIC BLOOD PRESSURE: 129 MMHG | OXYGEN SATURATION: 96 % | DIASTOLIC BLOOD PRESSURE: 83 MMHG | WEIGHT: 85.1 LBS | RESPIRATION RATE: 20 BRPM

## 2023-01-01 VITALS
SYSTOLIC BLOOD PRESSURE: 128 MMHG | BODY MASS INDEX: 16.53 KG/M2 | HEIGHT: 65 IN | HEART RATE: 110 BPM | OXYGEN SATURATION: 93 % | WEIGHT: 99.2 LBS | TEMPERATURE: 97.1 F | DIASTOLIC BLOOD PRESSURE: 69 MMHG

## 2023-01-01 VITALS
WEIGHT: 86 LBS | HEART RATE: 102 BPM | TEMPERATURE: 97.1 F | SYSTOLIC BLOOD PRESSURE: 135 MMHG | DIASTOLIC BLOOD PRESSURE: 78 MMHG | HEIGHT: 65 IN | BODY MASS INDEX: 14.33 KG/M2

## 2023-01-01 VITALS — HEART RATE: 99 BPM | DIASTOLIC BLOOD PRESSURE: 71 MMHG | SYSTOLIC BLOOD PRESSURE: 127 MMHG

## 2023-01-01 VITALS
TEMPERATURE: 98 F | HEART RATE: 104 BPM | BODY MASS INDEX: 16.41 KG/M2 | OXYGEN SATURATION: 97 % | SYSTOLIC BLOOD PRESSURE: 135 MMHG | WEIGHT: 98.6 LBS | DIASTOLIC BLOOD PRESSURE: 80 MMHG

## 2023-01-01 VITALS
HEIGHT: 65 IN | HEART RATE: 108 BPM | OXYGEN SATURATION: 96 % | SYSTOLIC BLOOD PRESSURE: 120 MMHG | BODY MASS INDEX: 13.99 KG/M2 | TEMPERATURE: 97.7 F | DIASTOLIC BLOOD PRESSURE: 80 MMHG | WEIGHT: 84 LBS

## 2023-01-01 VITALS
OXYGEN SATURATION: 94 % | HEIGHT: 65 IN | DIASTOLIC BLOOD PRESSURE: 60 MMHG | BODY MASS INDEX: 16.73 KG/M2 | TEMPERATURE: 97.5 F | HEART RATE: 90 BPM | SYSTOLIC BLOOD PRESSURE: 100 MMHG | WEIGHT: 100.42 LBS

## 2023-01-01 VITALS
DIASTOLIC BLOOD PRESSURE: 61 MMHG | HEART RATE: 104 BPM | BODY MASS INDEX: 16.5 KG/M2 | WEIGHT: 99 LBS | HEIGHT: 65 IN | SYSTOLIC BLOOD PRESSURE: 105 MMHG | OXYGEN SATURATION: 96 %

## 2023-01-01 VITALS
WEIGHT: 98 LBS | HEIGHT: 65 IN | OXYGEN SATURATION: 93 % | BODY MASS INDEX: 16.33 KG/M2 | DIASTOLIC BLOOD PRESSURE: 60 MMHG | SYSTOLIC BLOOD PRESSURE: 110 MMHG | HEART RATE: 110 BPM | TEMPERATURE: 97.8 F

## 2023-01-01 VITALS
HEIGHT: 65 IN | HEART RATE: 95 BPM | WEIGHT: 110.36 LBS | BODY MASS INDEX: 18.39 KG/M2 | OXYGEN SATURATION: 95 % | HEIGHT: 65 IN | TEMPERATURE: 97.7 F | SYSTOLIC BLOOD PRESSURE: 120 MMHG | WEIGHT: 86.99 LBS | TEMPERATURE: 97.3 F | OXYGEN SATURATION: 92 % | DIASTOLIC BLOOD PRESSURE: 68 MMHG | BODY MASS INDEX: 14.49 KG/M2 | HEART RATE: 75 BPM | DIASTOLIC BLOOD PRESSURE: 58 MMHG | SYSTOLIC BLOOD PRESSURE: 120 MMHG

## 2023-01-01 VITALS
SYSTOLIC BLOOD PRESSURE: 175 MMHG | WEIGHT: 88 LBS | HEART RATE: 113 BPM | HEIGHT: 65 IN | BODY MASS INDEX: 14.66 KG/M2 | DIASTOLIC BLOOD PRESSURE: 82 MMHG | OXYGEN SATURATION: 89 % | TEMPERATURE: 97.3 F

## 2023-01-01 VITALS
DIASTOLIC BLOOD PRESSURE: 65 MMHG | BODY MASS INDEX: 13.99 KG/M2 | WEIGHT: 84 LBS | SYSTOLIC BLOOD PRESSURE: 132 MMHG | HEIGHT: 65 IN

## 2023-01-01 VITALS
SYSTOLIC BLOOD PRESSURE: 112 MMHG | HEIGHT: 65 IN | WEIGHT: 110 LBS | OXYGEN SATURATION: 95 % | DIASTOLIC BLOOD PRESSURE: 70 MMHG | BODY MASS INDEX: 18.33 KG/M2 | HEART RATE: 92 BPM | TEMPERATURE: 96.2 F

## 2023-01-01 VITALS
BODY MASS INDEX: 16.33 KG/M2 | SYSTOLIC BLOOD PRESSURE: 118 MMHG | HEIGHT: 65 IN | HEART RATE: 88 BPM | DIASTOLIC BLOOD PRESSURE: 56 MMHG | WEIGHT: 98 LBS

## 2023-01-01 VITALS
DIASTOLIC BLOOD PRESSURE: 80 MMHG | BODY MASS INDEX: 14.99 KG/M2 | HEIGHT: 65 IN | WEIGHT: 89.99 LBS | TEMPERATURE: 97.9 F | HEART RATE: 124 BPM | OXYGEN SATURATION: 93 % | SYSTOLIC BLOOD PRESSURE: 120 MMHG

## 2023-01-01 VITALS
WEIGHT: 86 LBS | HEIGHT: 65 IN | TEMPERATURE: 98 F | BODY MASS INDEX: 14.33 KG/M2 | SYSTOLIC BLOOD PRESSURE: 143 MMHG | HEART RATE: 98 BPM | DIASTOLIC BLOOD PRESSURE: 79 MMHG | OXYGEN SATURATION: 95 %

## 2023-01-01 VITALS
HEART RATE: 105 BPM | SYSTOLIC BLOOD PRESSURE: 150 MMHG | DIASTOLIC BLOOD PRESSURE: 70 MMHG | WEIGHT: 86 LBS | TEMPERATURE: 98.4 F | HEIGHT: 65 IN | BODY MASS INDEX: 14.33 KG/M2 | OXYGEN SATURATION: 94 %

## 2023-01-01 VITALS
DIASTOLIC BLOOD PRESSURE: 84 MMHG | SYSTOLIC BLOOD PRESSURE: 164 MMHG | HEART RATE: 90 BPM | RESPIRATION RATE: 18 BRPM | OXYGEN SATURATION: 97 % | TEMPERATURE: 98 F

## 2023-01-01 VITALS
DIASTOLIC BLOOD PRESSURE: 60 MMHG | WEIGHT: 89 LBS | OXYGEN SATURATION: 85 % | TEMPERATURE: 98.1 F | BODY MASS INDEX: 14.83 KG/M2 | SYSTOLIC BLOOD PRESSURE: 96 MMHG | HEART RATE: 107 BPM | HEIGHT: 65 IN

## 2023-01-01 VITALS
DIASTOLIC BLOOD PRESSURE: 70 MMHG | WEIGHT: 86 LBS | HEIGHT: 65 IN | HEART RATE: 93 BPM | SYSTOLIC BLOOD PRESSURE: 120 MMHG | OXYGEN SATURATION: 83 % | BODY MASS INDEX: 14.33 KG/M2 | TEMPERATURE: 98.1 F

## 2023-01-01 VITALS
TEMPERATURE: 97.1 F | DIASTOLIC BLOOD PRESSURE: 76 MMHG | WEIGHT: 89 LBS | HEART RATE: 106 BPM | HEIGHT: 65 IN | OXYGEN SATURATION: 91 % | BODY MASS INDEX: 14.83 KG/M2 | SYSTOLIC BLOOD PRESSURE: 136 MMHG

## 2023-01-01 VITALS
TEMPERATURE: 98 F | OXYGEN SATURATION: 94 % | WEIGHT: 112 LBS | DIASTOLIC BLOOD PRESSURE: 72 MMHG | SYSTOLIC BLOOD PRESSURE: 128 MMHG | HEART RATE: 105 BPM | HEIGHT: 65 IN | BODY MASS INDEX: 18.66 KG/M2

## 2023-01-01 VITALS
OXYGEN SATURATION: 93 % | HEIGHT: 65 IN | DIASTOLIC BLOOD PRESSURE: 76 MMHG | TEMPERATURE: 96.5 F | HEART RATE: 92 BPM | BODY MASS INDEX: 18.33 KG/M2 | WEIGHT: 110 LBS | SYSTOLIC BLOOD PRESSURE: 118 MMHG

## 2023-01-01 VITALS
SYSTOLIC BLOOD PRESSURE: 142 MMHG | BODY MASS INDEX: 13.99 KG/M2 | RESPIRATION RATE: 17 BRPM | HEART RATE: 109 BPM | OXYGEN SATURATION: 96 % | TEMPERATURE: 97.3 F | HEIGHT: 65 IN | WEIGHT: 84 LBS | DIASTOLIC BLOOD PRESSURE: 76 MMHG

## 2023-01-01 VITALS — OXYGEN SATURATION: 97 % | HEART RATE: 110 BPM

## 2023-01-01 VITALS
WEIGHT: 86 LBS | SYSTOLIC BLOOD PRESSURE: 150 MMHG | OXYGEN SATURATION: 90 % | HEART RATE: 110 BPM | BODY MASS INDEX: 14.31 KG/M2 | TEMPERATURE: 98 F | DIASTOLIC BLOOD PRESSURE: 68 MMHG

## 2023-01-01 DIAGNOSIS — E78.5 HYPERLIPIDEMIA, UNSPECIFIED: ICD-10-CM

## 2023-01-01 DIAGNOSIS — E11.9 TYPE 2 DIABETES MELLITUS WITHOUT COMPLICATIONS: ICD-10-CM

## 2023-01-01 DIAGNOSIS — K59.00 CONSTIPATION, UNSPECIFIED: ICD-10-CM

## 2023-01-01 DIAGNOSIS — E11.29 TYPE 2 DIABETES MELLITUS WITH OTHER DIABETIC KIDNEY COMPLICATION: ICD-10-CM

## 2023-01-01 DIAGNOSIS — I10 ESSENTIAL (PRIMARY) HYPERTENSION: ICD-10-CM

## 2023-01-01 DIAGNOSIS — R49.8 OTHER VOICE AND RESONANCE DISORDERS: ICD-10-CM

## 2023-01-01 DIAGNOSIS — J69.0 PNEUMONITIS DUE TO INHALATION OF FOOD AND VOMIT: ICD-10-CM

## 2023-01-01 DIAGNOSIS — E87.1 HYPO-OSMOLALITY AND HYPONATREMIA: ICD-10-CM

## 2023-01-01 DIAGNOSIS — Z29.9 ENCOUNTER FOR PROPHYLACTIC MEASURES, UNSPECIFIED: ICD-10-CM

## 2023-01-01 DIAGNOSIS — Z95.1 PRESENCE OF AORTOCORONARY BYPASS GRAFT: ICD-10-CM

## 2023-01-01 DIAGNOSIS — M79.89 OTHER SPECIFIED SOFT TISSUE DISORDERS: ICD-10-CM

## 2023-01-01 DIAGNOSIS — J84.10 PULMONARY FIBROSIS, UNSPECIFIED: ICD-10-CM

## 2023-01-01 DIAGNOSIS — Z91.89 OTHER SPECIFIED PERSONAL RISK FACTORS, NOT ELSEWHERE CLASSIFIED: ICD-10-CM

## 2023-01-01 DIAGNOSIS — Z98.42 CATARACT EXTRACTION STATUS, LEFT EYE: ICD-10-CM

## 2023-01-01 DIAGNOSIS — R62.7 ADULT FAILURE TO THRIVE: ICD-10-CM

## 2023-01-01 DIAGNOSIS — R06.02 SHORTNESS OF BREATH: ICD-10-CM

## 2023-01-01 DIAGNOSIS — R45.89 OTHER SYMPTOMS AND SIGNS INVOLVING EMOTIONAL STATE: ICD-10-CM

## 2023-01-01 DIAGNOSIS — A41.9 SEPSIS, UNSPECIFIED ORGANISM: ICD-10-CM

## 2023-01-01 DIAGNOSIS — Z86.79 PERSONAL HISTORY OF OTHER DISEASES OF THE CIRCULATORY SYSTEM: ICD-10-CM

## 2023-01-01 DIAGNOSIS — J84.9 INTERSTITIAL PULMONARY DISEASE, UNSPECIFIED: ICD-10-CM

## 2023-01-01 DIAGNOSIS — E11.51 TYPE 2 DIABETES MELLITUS WITH DIABETIC PERIPHERAL ANGIOPATHY WITHOUT GANGRENE: ICD-10-CM

## 2023-01-01 DIAGNOSIS — J98.4 COVID-19: ICD-10-CM

## 2023-01-01 DIAGNOSIS — U07.1 COVID-19: ICD-10-CM

## 2023-01-01 DIAGNOSIS — R13.10 DYSPHAGIA, UNSPECIFIED: ICD-10-CM

## 2023-01-01 DIAGNOSIS — Z00.00 ENCOUNTER FOR GENERAL ADULT MEDICAL EXAMINATION W/OUT ABNORMAL FINDINGS: ICD-10-CM

## 2023-01-01 DIAGNOSIS — Z95.1 PRESENCE OF AORTOCORONARY BYPASS GRAFT: Chronic | ICD-10-CM

## 2023-01-01 DIAGNOSIS — R06.00 DYSPNEA, UNSPECIFIED: ICD-10-CM

## 2023-01-01 DIAGNOSIS — D72.829 ELEVATED WHITE BLOOD CELL COUNT, UNSPECIFIED: ICD-10-CM

## 2023-01-01 DIAGNOSIS — E43 UNSPECIFIED SEVERE PROTEIN-CALORIE MALNUTRITION: ICD-10-CM

## 2023-01-01 DIAGNOSIS — R80.9 TYPE 2 DIABETES MELLITUS WITH OTHER DIABETIC KIDNEY COMPLICATION: ICD-10-CM

## 2023-01-01 DIAGNOSIS — E11.40 TYPE 2 DIABETES MELLITUS WITH DIABETIC NEUROPATHY, UNSPECIFIED: ICD-10-CM

## 2023-01-01 DIAGNOSIS — L89.152 PRESSURE ULCER OF SACRAL REGION, STAGE 2: ICD-10-CM

## 2023-01-01 DIAGNOSIS — I25.10 ATHEROSCLEROTIC HEART DISEASE OF NATIVE CORONARY ARTERY W/OUT ANGINA PECTORIS: ICD-10-CM

## 2023-01-01 DIAGNOSIS — I63.9 CEREBRAL INFARCTION, UNSPECIFIED: ICD-10-CM

## 2023-01-01 DIAGNOSIS — Z98.41 CATARACT EXTRACTION STATUS, RIGHT EYE: ICD-10-CM

## 2023-01-01 DIAGNOSIS — J18.1 LOBAR PNEUMONIA, UNSPECIFIED ORGANISM: ICD-10-CM

## 2023-01-01 DIAGNOSIS — R64 CACHEXIA: ICD-10-CM

## 2023-01-01 DIAGNOSIS — R93.89 ABNORMAL FINDINGS ON DIAGNOSTIC IMAGING OF OTHER SPECIFIED BODY STRUCTURES: ICD-10-CM

## 2023-01-01 DIAGNOSIS — Z23 ENCOUNTER FOR IMMUNIZATION: ICD-10-CM

## 2023-01-01 DIAGNOSIS — R53.83 OTHER FATIGUE: ICD-10-CM

## 2023-01-01 DIAGNOSIS — Z92.29 PERSONAL HISTORY OF OTHER DRUG THERAPY: ICD-10-CM

## 2023-01-01 DIAGNOSIS — H61.22 IMPACTED CERUMEN, LEFT EAR: ICD-10-CM

## 2023-01-01 DIAGNOSIS — E11.9 TYPE 2 DIABETES MELLITUS W/OUT COMPLICATIONS: ICD-10-CM

## 2023-01-01 DIAGNOSIS — R05.3 CHRONIC COUGH: ICD-10-CM

## 2023-01-01 DIAGNOSIS — J96.01 ACUTE RESPIRATORY FAILURE WITH HYPOXIA: ICD-10-CM

## 2023-01-01 DIAGNOSIS — R63.4 ABNORMAL WEIGHT LOSS: ICD-10-CM

## 2023-01-01 DIAGNOSIS — Z11.59 ENCOUNTER FOR SCREENING FOR OTHER VIRAL DISEASES: ICD-10-CM

## 2023-01-01 DIAGNOSIS — I50.9 HEART FAILURE, UNSPECIFIED: ICD-10-CM

## 2023-01-01 DIAGNOSIS — R65.20 SEVERE SEPSIS WITHOUT SEPTIC SHOCK: ICD-10-CM

## 2023-01-01 DIAGNOSIS — K22.89 OTHER SPECIFIED DISEASE OF ESOPHAGUS: ICD-10-CM

## 2023-01-01 DIAGNOSIS — M25.521 PAIN IN RIGHT ELBOW: ICD-10-CM

## 2023-01-01 DIAGNOSIS — Z12.5 ENCOUNTER FOR SCREENING FOR MALIGNANT NEOPLASM OF PROSTATE: ICD-10-CM

## 2023-01-01 DIAGNOSIS — Z11.3 ENCOUNTER FOR SCREENING FOR INFECTIONS WITH A PREDOMINANTLY SEXUAL MODE OF TRANSMISSION: ICD-10-CM

## 2023-01-01 DIAGNOSIS — I25.10 ATHEROSCLEROTIC HEART DISEASE OF NATIVE CORONARY ARTERY WITHOUT ANGINA PECTORIS: ICD-10-CM

## 2023-01-01 DIAGNOSIS — E87.5 HYPERKALEMIA: ICD-10-CM

## 2023-01-01 DIAGNOSIS — Z79.82 LONG TERM (CURRENT) USE OF ASPIRIN: ICD-10-CM

## 2023-01-01 DIAGNOSIS — Z66 DO NOT RESUSCITATE: ICD-10-CM

## 2023-01-01 DIAGNOSIS — U09.9 POST COVID-19 CONDITION, UNSPECIFIED: ICD-10-CM

## 2023-01-01 DIAGNOSIS — Z11.4 ENCOUNTER FOR SCREENING FOR HUMAN IMMUNODEFICIENCY VIRUS [HIV]: ICD-10-CM

## 2023-01-01 DIAGNOSIS — Z09 ENCOUNTER FOR FOLLOW-UP EXAMINATION AFTER COMPLETED TREATMENT FOR CONDITIONS OTHER THAN MALIGNANT NEOPLASM: ICD-10-CM

## 2023-01-01 LAB
A1C WITH ESTIMATED AVERAGE GLUCOSE RESULT: 7.7 % — HIGH (ref 4–5.6)
A1C WITH ESTIMATED AVERAGE GLUCOSE RESULT: 7.7 % — HIGH (ref 4–5.6)
ALBUMIN SERPL ELPH-MCNC: 2.8 G/DL — LOW (ref 3.3–5)
ALBUMIN SERPL ELPH-MCNC: 2.8 G/DL — LOW (ref 3.3–5)
ALBUMIN SERPL ELPH-MCNC: 3.7 G/DL — SIGNIFICANT CHANGE UP (ref 3.3–5)
ALBUMIN SERPL ELPH-MCNC: 3.7 G/DL — SIGNIFICANT CHANGE UP (ref 3.3–5)
ALBUMIN SERPL ELPH-MCNC: 3.8 G/DL
ALBUMIN SERPL ELPH-MCNC: 3.9 G/DL
ALBUMIN SERPL ELPH-MCNC: 4 G/DL
ALBUMIN SERPL ELPH-MCNC: 4.4 G/DL
ALBUMIN SERPL ELPH-MCNC: 4.7 G/DL
ALP BLD-CCNC: 72 U/L
ALP BLD-CCNC: 77 U/L
ALP BLD-CCNC: 78 U/L
ALP BLD-CCNC: 79 U/L
ALP BLD-CCNC: 85 U/L
ALP SERPL-CCNC: 66 U/L — SIGNIFICANT CHANGE UP (ref 40–120)
ALP SERPL-CCNC: 66 U/L — SIGNIFICANT CHANGE UP (ref 40–120)
ALP SERPL-CCNC: 67 U/L — SIGNIFICANT CHANGE UP (ref 40–120)
ALP SERPL-CCNC: 67 U/L — SIGNIFICANT CHANGE UP (ref 40–120)
ALT FLD-CCNC: 14 U/L — SIGNIFICANT CHANGE UP (ref 10–45)
ALT FLD-CCNC: 14 U/L — SIGNIFICANT CHANGE UP (ref 10–45)
ALT FLD-CCNC: 19 U/L — SIGNIFICANT CHANGE UP (ref 10–45)
ALT FLD-CCNC: 19 U/L — SIGNIFICANT CHANGE UP (ref 10–45)
ALT SERPL-CCNC: 12 U/L
ALT SERPL-CCNC: 25 U/L
ALT SERPL-CCNC: 27 U/L
ANION GAP SERPL CALC-SCNC: 10 MMOL/L — SIGNIFICANT CHANGE UP (ref 5–17)
ANION GAP SERPL CALC-SCNC: 10 MMOL/L — SIGNIFICANT CHANGE UP (ref 5–17)
ANION GAP SERPL CALC-SCNC: 11 MMOL/L
ANION GAP SERPL CALC-SCNC: 12 MMOL/L
ANION GAP SERPL CALC-SCNC: 12 MMOL/L
ANION GAP SERPL CALC-SCNC: 12 MMOL/L — SIGNIFICANT CHANGE UP (ref 5–17)
ANION GAP SERPL CALC-SCNC: 12 MMOL/L — SIGNIFICANT CHANGE UP (ref 5–17)
ANION GAP SERPL CALC-SCNC: 14 MMOL/L
ANION GAP SERPL CALC-SCNC: 15 MMOL/L
ANION GAP SERPL CALC-SCNC: 15 MMOL/L
ANION GAP SERPL CALC-SCNC: 15 MMOL/L — SIGNIFICANT CHANGE UP (ref 5–17)
ANION GAP SERPL CALC-SCNC: 15 MMOL/L — SIGNIFICANT CHANGE UP (ref 5–17)
ANION GAP SERPL CALC-SCNC: 5 MMOL/L — SIGNIFICANT CHANGE UP (ref 5–17)
ANION GAP SERPL CALC-SCNC: 5 MMOL/L — SIGNIFICANT CHANGE UP (ref 5–17)
ANION GAP SERPL CALC-SCNC: 6 MMOL/L — SIGNIFICANT CHANGE UP (ref 5–17)
ANION GAP SERPL CALC-SCNC: 6 MMOL/L — SIGNIFICANT CHANGE UP (ref 5–17)
ANION GAP SERPL CALC-SCNC: 7 MMOL/L — SIGNIFICANT CHANGE UP (ref 5–17)
ANION GAP SERPL CALC-SCNC: 7 MMOL/L — SIGNIFICANT CHANGE UP (ref 5–17)
ANION GAP SERPL CALC-SCNC: 8 MMOL/L
APPEARANCE UR: CLEAR — SIGNIFICANT CHANGE UP
APPEARANCE UR: CLEAR — SIGNIFICANT CHANGE UP
APTT BLD: 32.1 SEC — SIGNIFICANT CHANGE UP (ref 24.5–35.6)
APTT BLD: 32.1 SEC — SIGNIFICANT CHANGE UP (ref 24.5–35.6)
AST SERPL-CCNC: 19 U/L
AST SERPL-CCNC: 19 U/L — SIGNIFICANT CHANGE UP (ref 10–40)
AST SERPL-CCNC: 19 U/L — SIGNIFICANT CHANGE UP (ref 10–40)
AST SERPL-CCNC: 26 U/L
AST SERPL-CCNC: 26 U/L — SIGNIFICANT CHANGE UP (ref 10–40)
AST SERPL-CCNC: 26 U/L — SIGNIFICANT CHANGE UP (ref 10–40)
AST SERPL-CCNC: 28 U/L
AST SERPL-CCNC: 28 U/L
AST SERPL-CCNC: 30 U/L
BACTERIA # UR AUTO: NEGATIVE /HPF — SIGNIFICANT CHANGE UP
BACTERIA # UR AUTO: NEGATIVE /HPF — SIGNIFICANT CHANGE UP
BASE EXCESS BLDV CALC-SCNC: 5.7 MMOL/L — HIGH (ref -2–3)
BASE EXCESS BLDV CALC-SCNC: 5.7 MMOL/L — HIGH (ref -2–3)
BASOPHILS # BLD AUTO: 0.05 K/UL — SIGNIFICANT CHANGE UP (ref 0–0.2)
BASOPHILS # BLD AUTO: 0.05 K/UL — SIGNIFICANT CHANGE UP (ref 0–0.2)
BASOPHILS # BLD AUTO: 0.07 K/UL — SIGNIFICANT CHANGE UP (ref 0–0.2)
BASOPHILS # BLD AUTO: 0.07 K/UL — SIGNIFICANT CHANGE UP (ref 0–0.2)
BASOPHILS # BLD AUTO: 0.08 K/UL
BASOPHILS # BLD AUTO: 0.08 K/UL — SIGNIFICANT CHANGE UP (ref 0–0.2)
BASOPHILS # BLD AUTO: 0.08 K/UL — SIGNIFICANT CHANGE UP (ref 0–0.2)
BASOPHILS # BLD AUTO: 0.11 K/UL
BASOPHILS # BLD AUTO: 0.13 K/UL
BASOPHILS # BLD AUTO: 0.15 K/UL
BASOPHILS NFR BLD AUTO: 0.3 % — SIGNIFICANT CHANGE UP (ref 0–2)
BASOPHILS NFR BLD AUTO: 0.3 % — SIGNIFICANT CHANGE UP (ref 0–2)
BASOPHILS NFR BLD AUTO: 0.6 % — SIGNIFICANT CHANGE UP (ref 0–2)
BASOPHILS NFR BLD AUTO: 0.6 % — SIGNIFICANT CHANGE UP (ref 0–2)
BASOPHILS NFR BLD AUTO: 0.7 %
BASOPHILS NFR BLD AUTO: 0.7 % — SIGNIFICANT CHANGE UP (ref 0–2)
BASOPHILS NFR BLD AUTO: 0.7 % — SIGNIFICANT CHANGE UP (ref 0–2)
BASOPHILS NFR BLD AUTO: 0.8 %
BASOPHILS NFR BLD AUTO: 0.9 %
BASOPHILS NFR BLD AUTO: 1.2 %
BILIRUB DIRECT SERPL-MCNC: 0.1 MG/DL
BILIRUB DIRECT SERPL-MCNC: 0.2 MG/DL — SIGNIFICANT CHANGE UP (ref 0–0.3)
BILIRUB DIRECT SERPL-MCNC: 0.2 MG/DL — SIGNIFICANT CHANGE UP (ref 0–0.3)
BILIRUB INDIRECT FLD-MCNC: 0.2 MG/DL — SIGNIFICANT CHANGE UP (ref 0.2–1)
BILIRUB INDIRECT FLD-MCNC: 0.2 MG/DL — SIGNIFICANT CHANGE UP (ref 0.2–1)
BILIRUB INDIRECT SERPL-MCNC: 0.1 MG/DL
BILIRUB SERPL-MCNC: 0.2 MG/DL
BILIRUB SERPL-MCNC: 0.3 MG/DL
BILIRUB SERPL-MCNC: 0.4 MG/DL — SIGNIFICANT CHANGE UP (ref 0.2–1.2)
BILIRUB SERPL-MCNC: 0.4 MG/DL — SIGNIFICANT CHANGE UP (ref 0.2–1.2)
BILIRUB SERPL-MCNC: 0.5 MG/DL
BILIRUB SERPL-MCNC: 0.5 MG/DL — SIGNIFICANT CHANGE UP (ref 0.2–1.2)
BILIRUB SERPL-MCNC: 0.5 MG/DL — SIGNIFICANT CHANGE UP (ref 0.2–1.2)
BILIRUB SERPL-MCNC: 0.6 MG/DL
BILIRUB SERPL-MCNC: 0.6 MG/DL
BILIRUB UR-MCNC: NEGATIVE — SIGNIFICANT CHANGE UP
BILIRUB UR-MCNC: NEGATIVE — SIGNIFICANT CHANGE UP
BLD GP AB SCN SERPL QL: NEGATIVE — SIGNIFICANT CHANGE UP
BLD GP AB SCN SERPL QL: NEGATIVE — SIGNIFICANT CHANGE UP
BUN SERPL-MCNC: 18 MG/DL
BUN SERPL-MCNC: 18 MG/DL
BUN SERPL-MCNC: 19 MG/DL
BUN SERPL-MCNC: 19 MG/DL — SIGNIFICANT CHANGE UP (ref 7–23)
BUN SERPL-MCNC: 19 MG/DL — SIGNIFICANT CHANGE UP (ref 7–23)
BUN SERPL-MCNC: 20 MG/DL
BUN SERPL-MCNC: 20 MG/DL — SIGNIFICANT CHANGE UP (ref 7–23)
BUN SERPL-MCNC: 22 MG/DL
BUN SERPL-MCNC: 22 MG/DL
BUN SERPL-MCNC: 22 MG/DL — SIGNIFICANT CHANGE UP (ref 7–23)
BUN SERPL-MCNC: 22 MG/DL — SIGNIFICANT CHANGE UP (ref 7–23)
BUN SERPL-MCNC: 23 MG/DL — SIGNIFICANT CHANGE UP (ref 7–23)
BUN SERPL-MCNC: 23 MG/DL — SIGNIFICANT CHANGE UP (ref 7–23)
BUN SERPL-MCNC: 24 MG/DL
BUN SERPL-MCNC: 24 MG/DL — HIGH (ref 7–23)
BUN SERPL-MCNC: 24 MG/DL — HIGH (ref 7–23)
BUN SERPL-MCNC: 28 MG/DL
CA-I SERPL-SCNC: 1.23 MMOL/L — SIGNIFICANT CHANGE UP (ref 1.15–1.33)
CA-I SERPL-SCNC: 1.23 MMOL/L — SIGNIFICANT CHANGE UP (ref 1.15–1.33)
CALCIUM SERPL-MCNC: 10.1 MG/DL
CALCIUM SERPL-MCNC: 10.2 MG/DL
CALCIUM SERPL-MCNC: 10.3 MG/DL
CALCIUM SERPL-MCNC: 10.4 MG/DL
CALCIUM SERPL-MCNC: 10.4 MG/DL
CALCIUM SERPL-MCNC: 10.5 MG/DL
CALCIUM SERPL-MCNC: 10.7 MG/DL — HIGH (ref 8.4–10.5)
CALCIUM SERPL-MCNC: 10.7 MG/DL — HIGH (ref 8.4–10.5)
CALCIUM SERPL-MCNC: 9.4 MG/DL — SIGNIFICANT CHANGE UP (ref 8.4–10.5)
CALCIUM SERPL-MCNC: 9.6 MG/DL — SIGNIFICANT CHANGE UP (ref 8.4–10.5)
CALCIUM SERPL-MCNC: 9.6 MG/DL — SIGNIFICANT CHANGE UP (ref 8.4–10.5)
CALCIUM SERPL-MCNC: 9.7 MG/DL — SIGNIFICANT CHANGE UP (ref 8.4–10.5)
CALCIUM SERPL-MCNC: 9.7 MG/DL — SIGNIFICANT CHANGE UP (ref 8.4–10.5)
CALCIUM SERPL-MCNC: 9.9 MG/DL — SIGNIFICANT CHANGE UP (ref 8.4–10.5)
CALCIUM SERPL-MCNC: 9.9 MG/DL — SIGNIFICANT CHANGE UP (ref 8.4–10.5)
CAST: 2 /LPF — SIGNIFICANT CHANGE UP (ref 0–4)
CAST: 2 /LPF — SIGNIFICANT CHANGE UP (ref 0–4)
CHLORIDE SERPL-SCNC: 90 MMOL/L
CHLORIDE SERPL-SCNC: 90 MMOL/L — LOW (ref 96–108)
CHLORIDE SERPL-SCNC: 90 MMOL/L — LOW (ref 96–108)
CHLORIDE SERPL-SCNC: 91 MMOL/L
CHLORIDE SERPL-SCNC: 92 MMOL/L
CHLORIDE SERPL-SCNC: 92 MMOL/L
CHLORIDE SERPL-SCNC: 93 MMOL/L
CHLORIDE SERPL-SCNC: 93 MMOL/L — LOW (ref 96–108)
CHLORIDE SERPL-SCNC: 94 MMOL/L
CHLORIDE SERPL-SCNC: 95 MMOL/L
CHLORIDE SERPL-SCNC: 95 MMOL/L
CHLORIDE SERPL-SCNC: 96 MMOL/L — SIGNIFICANT CHANGE UP (ref 96–108)
CHLORIDE SERPL-SCNC: 97 MMOL/L
CHLORIDE SERPL-SCNC: 97 MMOL/L — SIGNIFICANT CHANGE UP (ref 96–108)
CHLORIDE SERPL-SCNC: 97 MMOL/L — SIGNIFICANT CHANGE UP (ref 96–108)
CHOLEST SERPL-MCNC: 140 MG/DL
CO2 BLDV-SCNC: 37.8 MMOL/L — HIGH (ref 22–26)
CO2 BLDV-SCNC: 37.8 MMOL/L — HIGH (ref 22–26)
CO2 SERPL-SCNC: 23 MMOL/L
CO2 SERPL-SCNC: 23 MMOL/L
CO2 SERPL-SCNC: 24 MMOL/L
CO2 SERPL-SCNC: 24 MMOL/L
CO2 SERPL-SCNC: 24 MMOL/L — SIGNIFICANT CHANGE UP (ref 22–31)
CO2 SERPL-SCNC: 24 MMOL/L — SIGNIFICANT CHANGE UP (ref 22–31)
CO2 SERPL-SCNC: 26 MMOL/L
CO2 SERPL-SCNC: 26 MMOL/L
CO2 SERPL-SCNC: 27 MMOL/L
CO2 SERPL-SCNC: 28 MMOL/L
CO2 SERPL-SCNC: 28 MMOL/L — SIGNIFICANT CHANGE UP (ref 22–31)
CO2 SERPL-SCNC: 28 MMOL/L — SIGNIFICANT CHANGE UP (ref 22–31)
CO2 SERPL-SCNC: 30 MMOL/L
CO2 SERPL-SCNC: 31 MMOL/L — SIGNIFICANT CHANGE UP (ref 22–31)
CO2 SERPL-SCNC: 35 MMOL/L — HIGH (ref 22–31)
CO2 SERPL-SCNC: 35 MMOL/L — HIGH (ref 22–31)
COARSE GRAN CASTS #/AREA URNS AUTO: PRESENT
COARSE GRAN CASTS #/AREA URNS AUTO: PRESENT
COLOR SPEC: YELLOW — SIGNIFICANT CHANGE UP
COLOR SPEC: YELLOW — SIGNIFICANT CHANGE UP
CREAT ?TM UR-MCNC: 41 MG/DL — SIGNIFICANT CHANGE UP
CREAT ?TM UR-MCNC: 41 MG/DL — SIGNIFICANT CHANGE UP
CREAT SERPL-MCNC: 0.5 MG/DL
CREAT SERPL-MCNC: 0.51 MG/DL
CREAT SERPL-MCNC: 0.51 MG/DL
CREAT SERPL-MCNC: 0.53 MG/DL — SIGNIFICANT CHANGE UP (ref 0.5–1.3)
CREAT SERPL-MCNC: 0.53 MG/DL — SIGNIFICANT CHANGE UP (ref 0.5–1.3)
CREAT SERPL-MCNC: 0.54 MG/DL — SIGNIFICANT CHANGE UP (ref 0.5–1.3)
CREAT SERPL-MCNC: 0.54 MG/DL — SIGNIFICANT CHANGE UP (ref 0.5–1.3)
CREAT SERPL-MCNC: 0.56 MG/DL — SIGNIFICANT CHANGE UP (ref 0.5–1.3)
CREAT SERPL-MCNC: 0.56 MG/DL — SIGNIFICANT CHANGE UP (ref 0.5–1.3)
CREAT SERPL-MCNC: 0.57 MG/DL
CREAT SERPL-MCNC: 0.57 MG/DL — SIGNIFICANT CHANGE UP (ref 0.5–1.3)
CREAT SERPL-MCNC: 0.57 MG/DL — SIGNIFICANT CHANGE UP (ref 0.5–1.3)
CREAT SERPL-MCNC: 0.58 MG/DL — SIGNIFICANT CHANGE UP (ref 0.5–1.3)
CREAT SERPL-MCNC: 0.58 MG/DL — SIGNIFICANT CHANGE UP (ref 0.5–1.3)
CREAT SERPL-MCNC: 0.59 MG/DL — SIGNIFICANT CHANGE UP (ref 0.5–1.3)
CREAT SERPL-MCNC: 0.59 MG/DL — SIGNIFICANT CHANGE UP (ref 0.5–1.3)
CREAT SERPL-MCNC: 0.68 MG/DL
CREAT SERPL-MCNC: 0.69 MG/DL
CREAT SERPL-MCNC: 0.74 MG/DL
CREAT SERPL-MCNC: 0.82 MG/DL
CREAT SPEC-SCNC: 34 MG/DL
CRP SERPL-MCNC: <3 MG/L
CULTURE RESULTS: NO GROWTH — SIGNIFICANT CHANGE UP
CULTURE RESULTS: NO GROWTH — SIGNIFICANT CHANGE UP
CULTURE RESULTS: SIGNIFICANT CHANGE UP
DIFF PNL FLD: ABNORMAL
DIFF PNL FLD: ABNORMAL
EGFR: 100 ML/MIN/1.73M2
EGFR: 100 ML/MIN/1.73M2 — SIGNIFICANT CHANGE UP
EGFR: 101 ML/MIN/1.73M2 — SIGNIFICANT CHANGE UP
EGFR: 101 ML/MIN/1.73M2 — SIGNIFICANT CHANGE UP
EGFR: 102 ML/MIN/1.73M2 — SIGNIFICANT CHANGE UP
EGFR: 102 ML/MIN/1.73M2 — SIGNIFICANT CHANGE UP
EGFR: 103 ML/MIN/1.73M2 — SIGNIFICANT CHANGE UP
EGFR: 103 ML/MIN/1.73M2 — SIGNIFICANT CHANGE UP
EGFR: 104 ML/MIN/1.73M2
EGFR: 90 ML/MIN/1.73M2
EGFR: 93 ML/MIN/1.73M2
EGFR: 95 ML/MIN/1.73M2
EGFR: 95 ML/MIN/1.73M2
EGFR: 99 ML/MIN/1.73M2 — SIGNIFICANT CHANGE UP
EGFR: 99 ML/MIN/1.73M2 — SIGNIFICANT CHANGE UP
EOSINOPHIL # BLD AUTO: 0 K/UL — SIGNIFICANT CHANGE UP (ref 0–0.5)
EOSINOPHIL # BLD AUTO: 0 K/UL — SIGNIFICANT CHANGE UP (ref 0–0.5)
EOSINOPHIL # BLD AUTO: 0.03 K/UL — SIGNIFICANT CHANGE UP (ref 0–0.5)
EOSINOPHIL # BLD AUTO: 0.03 K/UL — SIGNIFICANT CHANGE UP (ref 0–0.5)
EOSINOPHIL # BLD AUTO: 0.22 K/UL
EOSINOPHIL # BLD AUTO: 0.29 K/UL — SIGNIFICANT CHANGE UP (ref 0–0.5)
EOSINOPHIL # BLD AUTO: 0.29 K/UL — SIGNIFICANT CHANGE UP (ref 0–0.5)
EOSINOPHIL # BLD AUTO: 0.49 K/UL
EOSINOPHIL # BLD AUTO: 0.78 K/UL
EOSINOPHIL # BLD AUTO: 0.92 K/UL
EOSINOPHIL NFR BLD AUTO: 0 % — SIGNIFICANT CHANGE UP (ref 0–6)
EOSINOPHIL NFR BLD AUTO: 0 % — SIGNIFICANT CHANGE UP (ref 0–6)
EOSINOPHIL NFR BLD AUTO: 0.2 % — SIGNIFICANT CHANGE UP (ref 0–6)
EOSINOPHIL NFR BLD AUTO: 0.2 % — SIGNIFICANT CHANGE UP (ref 0–6)
EOSINOPHIL NFR BLD AUTO: 2.2 %
EOSINOPHIL NFR BLD AUTO: 2.7 % — SIGNIFICANT CHANGE UP (ref 0–6)
EOSINOPHIL NFR BLD AUTO: 2.7 % — SIGNIFICANT CHANGE UP (ref 0–6)
EOSINOPHIL NFR BLD AUTO: 3.2 %
EOSINOPHIL NFR BLD AUTO: 5.4 %
EOSINOPHIL NFR BLD AUTO: 7.6 %
ERYTHROCYTE [SEDIMENTATION RATE] IN BLOOD BY WESTERGREN METHOD: 45 MM/HR
ESTIMATED AVERAGE GLUCOSE: 163 MG/DL
ESTIMATED AVERAGE GLUCOSE: 171 MG/DL
ESTIMATED AVERAGE GLUCOSE: 174 MG/DL — HIGH (ref 68–114)
ESTIMATED AVERAGE GLUCOSE: 174 MG/DL — HIGH (ref 68–114)
ESTIMATED AVERAGE GLUCOSE: 186 MG/DL
ESTIMATED AVERAGE GLUCOSE: 194 MG/DL
FINE GRAN CASTS #/AREA URNS AUTO: PRESENT
FINE GRAN CASTS #/AREA URNS AUTO: PRESENT
GAS PNL BLDV: 130 MMOL/L — LOW (ref 136–145)
GAS PNL BLDV: 130 MMOL/L — LOW (ref 136–145)
GAS PNL BLDV: SIGNIFICANT CHANGE UP
GAS PNL BLDV: SIGNIFICANT CHANGE UP
GLUCOSE BLDC GLUCOMTR-MCNC: 105 MG/DL — HIGH (ref 70–99)
GLUCOSE BLDC GLUCOMTR-MCNC: 105 MG/DL — HIGH (ref 70–99)
GLUCOSE BLDC GLUCOMTR-MCNC: 108 MG/DL — HIGH (ref 70–99)
GLUCOSE BLDC GLUCOMTR-MCNC: 108 MG/DL — HIGH (ref 70–99)
GLUCOSE BLDC GLUCOMTR-MCNC: 111 MG/DL — HIGH (ref 70–99)
GLUCOSE BLDC GLUCOMTR-MCNC: 111 MG/DL — HIGH (ref 70–99)
GLUCOSE BLDC GLUCOMTR-MCNC: 118 MG/DL — HIGH (ref 70–99)
GLUCOSE BLDC GLUCOMTR-MCNC: 118 MG/DL — HIGH (ref 70–99)
GLUCOSE BLDC GLUCOMTR-MCNC: 127 MG/DL — HIGH (ref 70–99)
GLUCOSE BLDC GLUCOMTR-MCNC: 127 MG/DL — HIGH (ref 70–99)
GLUCOSE BLDC GLUCOMTR-MCNC: 128 MG/DL — HIGH (ref 70–99)
GLUCOSE BLDC GLUCOMTR-MCNC: 128 MG/DL — HIGH (ref 70–99)
GLUCOSE BLDC GLUCOMTR-MCNC: 134 MG/DL — HIGH (ref 70–99)
GLUCOSE BLDC GLUCOMTR-MCNC: 134 MG/DL — HIGH (ref 70–99)
GLUCOSE BLDC GLUCOMTR-MCNC: 139 MG/DL — HIGH (ref 70–99)
GLUCOSE BLDC GLUCOMTR-MCNC: 139 MG/DL — HIGH (ref 70–99)
GLUCOSE BLDC GLUCOMTR-MCNC: 140 MG/DL — HIGH (ref 70–99)
GLUCOSE BLDC GLUCOMTR-MCNC: 140 MG/DL — HIGH (ref 70–99)
GLUCOSE BLDC GLUCOMTR-MCNC: 164 MG/DL — HIGH (ref 70–99)
GLUCOSE BLDC GLUCOMTR-MCNC: 164 MG/DL — HIGH (ref 70–99)
GLUCOSE BLDC GLUCOMTR-MCNC: 220 MG/DL — HIGH (ref 70–99)
GLUCOSE BLDC GLUCOMTR-MCNC: 220 MG/DL — HIGH (ref 70–99)
GLUCOSE BLDC GLUCOMTR-MCNC: 226 MG/DL — HIGH (ref 70–99)
GLUCOSE BLDC GLUCOMTR-MCNC: 226 MG/DL — HIGH (ref 70–99)
GLUCOSE BLDC GLUCOMTR-MCNC: 247 MG/DL — HIGH (ref 70–99)
GLUCOSE BLDC GLUCOMTR-MCNC: 247 MG/DL — HIGH (ref 70–99)
GLUCOSE BLDC GLUCOMTR-MCNC: 446 MG/DL — HIGH (ref 70–99)
GLUCOSE BLDC GLUCOMTR-MCNC: 446 MG/DL — HIGH (ref 70–99)
GLUCOSE BLDC GLUCOMTR-MCNC: 84 MG/DL — SIGNIFICANT CHANGE UP (ref 70–99)
GLUCOSE BLDC GLUCOMTR-MCNC: 84 MG/DL — SIGNIFICANT CHANGE UP (ref 70–99)
GLUCOSE BLDC GLUCOMTR-MCNC: 96 MG/DL — SIGNIFICANT CHANGE UP (ref 70–99)
GLUCOSE BLDC GLUCOMTR-MCNC: 96 MG/DL — SIGNIFICANT CHANGE UP (ref 70–99)
GLUCOSE SERPL-MCNC: 112 MG/DL — HIGH (ref 70–99)
GLUCOSE SERPL-MCNC: 112 MG/DL — HIGH (ref 70–99)
GLUCOSE SERPL-MCNC: 113 MG/DL — HIGH (ref 70–99)
GLUCOSE SERPL-MCNC: 113 MG/DL — HIGH (ref 70–99)
GLUCOSE SERPL-MCNC: 132 MG/DL
GLUCOSE SERPL-MCNC: 137 MG/DL
GLUCOSE SERPL-MCNC: 156 MG/DL
GLUCOSE SERPL-MCNC: 170 MG/DL
GLUCOSE SERPL-MCNC: 174 MG/DL — HIGH (ref 70–99)
GLUCOSE SERPL-MCNC: 174 MG/DL — HIGH (ref 70–99)
GLUCOSE SERPL-MCNC: 188 MG/DL
GLUCOSE SERPL-MCNC: 193 MG/DL
GLUCOSE SERPL-MCNC: 207 MG/DL
GLUCOSE SERPL-MCNC: 217 MG/DL — HIGH (ref 70–99)
GLUCOSE SERPL-MCNC: 217 MG/DL — HIGH (ref 70–99)
GLUCOSE SERPL-MCNC: 311 MG/DL
GLUCOSE SERPL-MCNC: 87 MG/DL — SIGNIFICANT CHANGE UP (ref 70–99)
GLUCOSE SERPL-MCNC: 87 MG/DL — SIGNIFICANT CHANGE UP (ref 70–99)
GLUCOSE SERPL-MCNC: 91 MG/DL — SIGNIFICANT CHANGE UP (ref 70–99)
GLUCOSE SERPL-MCNC: 91 MG/DL — SIGNIFICANT CHANGE UP (ref 70–99)
GLUCOSE UR QL: >=1000 MG/DL
GLUCOSE UR QL: >=1000 MG/DL
HBA1C MFR BLD HPLC: 7.3 %
HBA1C MFR BLD HPLC: 7.6 %
HBA1C MFR BLD HPLC: 8.1 %
HBA1C MFR BLD HPLC: 8.4 %
HBV CORE IGG+IGM SER QL: NONREACTIVE
HBV SURFACE AB SER QL: REACTIVE
HBV SURFACE AG SER QL: NONREACTIVE
HCO3 BLDV-SCNC: 35 MMOL/L — HIGH (ref 22–29)
HCO3 BLDV-SCNC: 35 MMOL/L — HIGH (ref 22–29)
HCT VFR BLD CALC: 38.9 % — LOW (ref 39–50)
HCT VFR BLD CALC: 38.9 % — LOW (ref 39–50)
HCT VFR BLD CALC: 39.8 % — SIGNIFICANT CHANGE UP (ref 39–50)
HCT VFR BLD CALC: 39.8 % — SIGNIFICANT CHANGE UP (ref 39–50)
HCT VFR BLD CALC: 41.6 % — SIGNIFICANT CHANGE UP (ref 39–50)
HCT VFR BLD CALC: 41.6 % — SIGNIFICANT CHANGE UP (ref 39–50)
HCT VFR BLD CALC: 42.9 %
HCT VFR BLD CALC: 43.1 %
HCT VFR BLD CALC: 44.7 % — SIGNIFICANT CHANGE UP (ref 39–50)
HCT VFR BLD CALC: 44.7 % — SIGNIFICANT CHANGE UP (ref 39–50)
HCT VFR BLD CALC: 47.3 %
HCT VFR BLD CALC: 47.5 %
HCV AB S/CO SERPL IA: 0.04 S/CO — SIGNIFICANT CHANGE UP
HCV AB S/CO SERPL IA: 0.04 S/CO — SIGNIFICANT CHANGE UP
HCV AB SER QL: NONREACTIVE
HCV AB SERPL-IMP: SIGNIFICANT CHANGE UP
HCV AB SERPL-IMP: SIGNIFICANT CHANGE UP
HCV S/CO RATIO: 0.11 S/CO
HDLC SERPL-MCNC: 66 MG/DL
HGB BLD-MCNC: 12.4 G/DL — LOW (ref 13–17)
HGB BLD-MCNC: 13.2 G/DL — SIGNIFICANT CHANGE UP (ref 13–17)
HGB BLD-MCNC: 13.2 G/DL — SIGNIFICANT CHANGE UP (ref 13–17)
HGB BLD-MCNC: 13.4 G/DL
HGB BLD-MCNC: 14 G/DL
HGB BLD-MCNC: 14.5 G/DL — SIGNIFICANT CHANGE UP (ref 13–17)
HGB BLD-MCNC: 14.5 G/DL — SIGNIFICANT CHANGE UP (ref 13–17)
HGB BLD-MCNC: 15.1 G/DL
HGB BLD-MCNC: 15.4 G/DL
HIV1+2 AB SPEC QL IA.RAPID: NONREACTIVE
IMM GRANULOCYTES NFR BLD AUTO: 0.3 %
IMM GRANULOCYTES NFR BLD AUTO: 0.3 % — SIGNIFICANT CHANGE UP (ref 0–0.9)
IMM GRANULOCYTES NFR BLD AUTO: 0.3 % — SIGNIFICANT CHANGE UP (ref 0–0.9)
IMM GRANULOCYTES NFR BLD AUTO: 0.4 % — SIGNIFICANT CHANGE UP (ref 0–0.9)
IMM GRANULOCYTES NFR BLD AUTO: 0.4 % — SIGNIFICANT CHANGE UP (ref 0–0.9)
IMM GRANULOCYTES NFR BLD AUTO: 0.5 %
IMM GRANULOCYTES NFR BLD AUTO: 0.5 % — SIGNIFICANT CHANGE UP (ref 0–0.9)
IMM GRANULOCYTES NFR BLD AUTO: 0.5 % — SIGNIFICANT CHANGE UP (ref 0–0.9)
INR BLD: 0.95 — SIGNIFICANT CHANGE UP (ref 0.85–1.18)
INR BLD: 0.95 — SIGNIFICANT CHANGE UP (ref 0.85–1.18)
KETONES UR-MCNC: ABNORMAL MG/DL
KETONES UR-MCNC: ABNORMAL MG/DL
LDLC SERPL CALC-MCNC: 58 MG/DL
LEGIONELLA AG UR QL: NEGATIVE — SIGNIFICANT CHANGE UP
LEGIONELLA AG UR QL: NEGATIVE — SIGNIFICANT CHANGE UP
LEUKOCYTE ESTERASE UR-ACNC: NEGATIVE — SIGNIFICANT CHANGE UP
LEUKOCYTE ESTERASE UR-ACNC: NEGATIVE — SIGNIFICANT CHANGE UP
LYMPHOCYTES # BLD AUTO: 0.56 K/UL — LOW (ref 1–3.3)
LYMPHOCYTES # BLD AUTO: 0.56 K/UL — LOW (ref 1–3.3)
LYMPHOCYTES # BLD AUTO: 0.69 K/UL — LOW (ref 1–3.3)
LYMPHOCYTES # BLD AUTO: 0.69 K/UL — LOW (ref 1–3.3)
LYMPHOCYTES # BLD AUTO: 1.12 K/UL — SIGNIFICANT CHANGE UP (ref 1–3.3)
LYMPHOCYTES # BLD AUTO: 1.12 K/UL — SIGNIFICANT CHANGE UP (ref 1–3.3)
LYMPHOCYTES # BLD AUTO: 1.64 K/UL
LYMPHOCYTES # BLD AUTO: 10.6 % — LOW (ref 13–44)
LYMPHOCYTES # BLD AUTO: 10.6 % — LOW (ref 13–44)
LYMPHOCYTES # BLD AUTO: 2.21 K/UL
LYMPHOCYTES # BLD AUTO: 2.22 K/UL
LYMPHOCYTES # BLD AUTO: 3.05 K/UL
LYMPHOCYTES # BLD AUTO: 3.2 % — LOW (ref 13–44)
LYMPHOCYTES # BLD AUTO: 3.2 % — LOW (ref 13–44)
LYMPHOCYTES # BLD AUTO: 4.9 % — LOW (ref 13–44)
LYMPHOCYTES # BLD AUTO: 4.9 % — LOW (ref 13–44)
LYMPHOCYTES NFR BLD AUTO: 14.7 %
LYMPHOCYTES NFR BLD AUTO: 16.3 %
LYMPHOCYTES NFR BLD AUTO: 18.2 %
LYMPHOCYTES NFR BLD AUTO: 21.3 %
MAGNESIUM SERPL-MCNC: 1.8 MG/DL — SIGNIFICANT CHANGE UP (ref 1.6–2.6)
MAGNESIUM SERPL-MCNC: 1.9 MG/DL — SIGNIFICANT CHANGE UP (ref 1.6–2.6)
MAGNESIUM SERPL-MCNC: 1.9 MG/DL — SIGNIFICANT CHANGE UP (ref 1.6–2.6)
MAGNESIUM SERPL-MCNC: 2.1 MG/DL — SIGNIFICANT CHANGE UP (ref 1.6–2.6)
MAGNESIUM SERPL-MCNC: 2.1 MG/DL — SIGNIFICANT CHANGE UP (ref 1.6–2.6)
MAN DIFF?: NORMAL
MCHC RBC-ENTMCNC: 29 PG
MCHC RBC-ENTMCNC: 29 PG
MCHC RBC-ENTMCNC: 29.1 PG — SIGNIFICANT CHANGE UP (ref 27–34)
MCHC RBC-ENTMCNC: 29.1 PG — SIGNIFICANT CHANGE UP (ref 27–34)
MCHC RBC-ENTMCNC: 29.3 PG — SIGNIFICANT CHANGE UP (ref 27–34)
MCHC RBC-ENTMCNC: 29.3 PG — SIGNIFICANT CHANGE UP (ref 27–34)
MCHC RBC-ENTMCNC: 29.4 PG
MCHC RBC-ENTMCNC: 29.5 PG — SIGNIFICANT CHANGE UP (ref 27–34)
MCHC RBC-ENTMCNC: 29.5 PG — SIGNIFICANT CHANGE UP (ref 27–34)
MCHC RBC-ENTMCNC: 29.6 PG — SIGNIFICANT CHANGE UP (ref 27–34)
MCHC RBC-ENTMCNC: 29.6 PG — SIGNIFICANT CHANGE UP (ref 27–34)
MCHC RBC-ENTMCNC: 29.9 PG
MCHC RBC-ENTMCNC: 31.1 GM/DL
MCHC RBC-ENTMCNC: 31.2 GM/DL — LOW (ref 32–36)
MCHC RBC-ENTMCNC: 31.2 GM/DL — LOW (ref 32–36)
MCHC RBC-ENTMCNC: 31.7 GM/DL — LOW (ref 32–36)
MCHC RBC-ENTMCNC: 31.7 GM/DL — LOW (ref 32–36)
MCHC RBC-ENTMCNC: 31.9 GM/DL
MCHC RBC-ENTMCNC: 31.9 GM/DL — LOW (ref 32–36)
MCHC RBC-ENTMCNC: 31.9 GM/DL — LOW (ref 32–36)
MCHC RBC-ENTMCNC: 32.4 GM/DL
MCHC RBC-ENTMCNC: 32.4 GM/DL — SIGNIFICANT CHANGE UP (ref 32–36)
MCHC RBC-ENTMCNC: 32.4 GM/DL — SIGNIFICANT CHANGE UP (ref 32–36)
MCHC RBC-ENTMCNC: 32.6 GM/DL
MCV RBC AUTO: 89.5 FL
MCV RBC AUTO: 91.2 FL — SIGNIFICANT CHANGE UP (ref 80–100)
MCV RBC AUTO: 91.2 FL — SIGNIFICANT CHANGE UP (ref 80–100)
MCV RBC AUTO: 91.5 FL
MCV RBC AUTO: 91.6 FL — SIGNIFICANT CHANGE UP (ref 80–100)
MCV RBC AUTO: 91.6 FL — SIGNIFICANT CHANGE UP (ref 80–100)
MCV RBC AUTO: 92 FL
MCV RBC AUTO: 92.6 FL — SIGNIFICANT CHANGE UP (ref 80–100)
MCV RBC AUTO: 92.6 FL — SIGNIFICANT CHANGE UP (ref 80–100)
MCV RBC AUTO: 93.3 FL
MCV RBC AUTO: 94.1 FL — SIGNIFICANT CHANGE UP (ref 80–100)
MCV RBC AUTO: 94.1 FL — SIGNIFICANT CHANGE UP (ref 80–100)
MICROALBUMIN 24H UR DL<=1MG/L-MCNC: 2.3 MG/DL
MICROALBUMIN/CREAT 24H UR-RTO: 66 MG/G
MONOCYTES # BLD AUTO: 0.55 K/UL
MONOCYTES # BLD AUTO: 0.74 K/UL — SIGNIFICANT CHANGE UP (ref 0–0.9)
MONOCYTES # BLD AUTO: 0.74 K/UL — SIGNIFICANT CHANGE UP (ref 0–0.9)
MONOCYTES # BLD AUTO: 0.81 K/UL
MONOCYTES # BLD AUTO: 0.88 K/UL
MONOCYTES # BLD AUTO: 1.03 K/UL — HIGH (ref 0–0.9)
MONOCYTES # BLD AUTO: 1.03 K/UL — HIGH (ref 0–0.9)
MONOCYTES # BLD AUTO: 1.06 K/UL — HIGH (ref 0–0.9)
MONOCYTES # BLD AUTO: 1.06 K/UL — HIGH (ref 0–0.9)
MONOCYTES # BLD AUTO: 1.08 K/UL
MONOCYTES NFR BLD AUTO: 5.5 %
MONOCYTES NFR BLD AUTO: 5.8 %
MONOCYTES NFR BLD AUTO: 6.1 % — SIGNIFICANT CHANGE UP (ref 2–14)
MONOCYTES NFR BLD AUTO: 6.1 % — SIGNIFICANT CHANGE UP (ref 2–14)
MONOCYTES NFR BLD AUTO: 6.7 %
MONOCYTES NFR BLD AUTO: 7 % — SIGNIFICANT CHANGE UP (ref 2–14)
MONOCYTES NFR BLD AUTO: 7 % — SIGNIFICANT CHANGE UP (ref 2–14)
MONOCYTES NFR BLD AUTO: 7.3 % — SIGNIFICANT CHANGE UP (ref 2–14)
MONOCYTES NFR BLD AUTO: 7.3 % — SIGNIFICANT CHANGE UP (ref 2–14)
MONOCYTES NFR BLD AUTO: 7.5 %
MRSA PCR RESULT.: NEGATIVE — SIGNIFICANT CHANGE UP
MRSA PCR RESULT.: NEGATIVE — SIGNIFICANT CHANGE UP
NEUTROPHILS # BLD AUTO: 11.33 K/UL
NEUTROPHILS # BLD AUTO: 12.23 K/UL — HIGH (ref 1.8–7.4)
NEUTROPHILS # BLD AUTO: 12.23 K/UL — HIGH (ref 1.8–7.4)
NEUTROPHILS # BLD AUTO: 15.66 K/UL — HIGH (ref 1.8–7.4)
NEUTROPHILS # BLD AUTO: 15.66 K/UL — HIGH (ref 1.8–7.4)
NEUTROPHILS # BLD AUTO: 7.53 K/UL
NEUTROPHILS # BLD AUTO: 8.03 K/UL
NEUTROPHILS # BLD AUTO: 8.31 K/UL — HIGH (ref 1.8–7.4)
NEUTROPHILS # BLD AUTO: 8.31 K/UL — HIGH (ref 1.8–7.4)
NEUTROPHILS # BLD AUTO: 9.21 K/UL
NEUTROPHILS NFR BLD AUTO: 64.4 %
NEUTROPHILS NFR BLD AUTO: 66 %
NEUTROPHILS NFR BLD AUTO: 74.9 %
NEUTROPHILS NFR BLD AUTO: 75.3 %
NEUTROPHILS NFR BLD AUTO: 78.7 % — HIGH (ref 43–77)
NEUTROPHILS NFR BLD AUTO: 78.7 % — HIGH (ref 43–77)
NEUTROPHILS NFR BLD AUTO: 86.6 % — HIGH (ref 43–77)
NEUTROPHILS NFR BLD AUTO: 86.6 % — HIGH (ref 43–77)
NEUTROPHILS NFR BLD AUTO: 89.9 % — HIGH (ref 43–77)
NEUTROPHILS NFR BLD AUTO: 89.9 % — HIGH (ref 43–77)
NITRITE UR-MCNC: NEGATIVE — SIGNIFICANT CHANGE UP
NITRITE UR-MCNC: NEGATIVE — SIGNIFICANT CHANGE UP
NONHDLC SERPL-MCNC: 74 MG/DL
NRBC # BLD: 0 /100 WBCS — SIGNIFICANT CHANGE UP (ref 0–0)
NT-PROBNP SERPL-MCNC: 188 PG/ML
NT-PROBNP SERPL-SCNC: 400 PG/ML — HIGH (ref 0–300)
NT-PROBNP SERPL-SCNC: 400 PG/ML — HIGH (ref 0–300)
OSMOLALITY UR: 668 MOSM/KG — SIGNIFICANT CHANGE UP (ref 300–900)
OSMOLALITY UR: 668 MOSM/KG — SIGNIFICANT CHANGE UP (ref 300–900)
PCO2 BLDV: 77 MMHG — HIGH (ref 42–55)
PCO2 BLDV: 77 MMHG — HIGH (ref 42–55)
PH BLDV: 7.27 — LOW (ref 7.32–7.43)
PH BLDV: 7.27 — LOW (ref 7.32–7.43)
PH UR: 5.5 — SIGNIFICANT CHANGE UP (ref 5–8)
PH UR: 5.5 — SIGNIFICANT CHANGE UP (ref 5–8)
PHOSPHATE SERPL-MCNC: 2.7 MG/DL — SIGNIFICANT CHANGE UP (ref 2.5–4.5)
PHOSPHATE SERPL-MCNC: 2.7 MG/DL — SIGNIFICANT CHANGE UP (ref 2.5–4.5)
PHOSPHATE SERPL-MCNC: 2.9 MG/DL — SIGNIFICANT CHANGE UP (ref 2.5–4.5)
PHOSPHATE SERPL-MCNC: 2.9 MG/DL — SIGNIFICANT CHANGE UP (ref 2.5–4.5)
PHOSPHATE SERPL-MCNC: 3.3 MG/DL — SIGNIFICANT CHANGE UP (ref 2.5–4.5)
PHOSPHATE SERPL-MCNC: 3.3 MG/DL — SIGNIFICANT CHANGE UP (ref 2.5–4.5)
PHOSPHATE SERPL-MCNC: 3.9 MG/DL — SIGNIFICANT CHANGE UP (ref 2.5–4.5)
PHOSPHATE SERPL-MCNC: 3.9 MG/DL — SIGNIFICANT CHANGE UP (ref 2.5–4.5)
PLATELET # BLD AUTO: 212 K/UL — SIGNIFICANT CHANGE UP (ref 150–400)
PLATELET # BLD AUTO: 212 K/UL — SIGNIFICANT CHANGE UP (ref 150–400)
PLATELET # BLD AUTO: 240 K/UL — SIGNIFICANT CHANGE UP (ref 150–400)
PLATELET # BLD AUTO: 240 K/UL — SIGNIFICANT CHANGE UP (ref 150–400)
PLATELET # BLD AUTO: 257 K/UL — SIGNIFICANT CHANGE UP (ref 150–400)
PLATELET # BLD AUTO: 257 K/UL — SIGNIFICANT CHANGE UP (ref 150–400)
PLATELET # BLD AUTO: 264 K/UL
PLATELET # BLD AUTO: 273 K/UL — SIGNIFICANT CHANGE UP (ref 150–400)
PLATELET # BLD AUTO: 273 K/UL — SIGNIFICANT CHANGE UP (ref 150–400)
PLATELET # BLD AUTO: 341 K/UL
PLATELET # BLD AUTO: 345 K/UL
PLATELET # BLD AUTO: 353 K/UL
PO2 BLDV: 41 MMHG — SIGNIFICANT CHANGE UP (ref 25–45)
PO2 BLDV: 41 MMHG — SIGNIFICANT CHANGE UP (ref 25–45)
POTASSIUM BLDV-SCNC: 5.1 MMOL/L — SIGNIFICANT CHANGE UP (ref 3.5–5.1)
POTASSIUM BLDV-SCNC: 5.1 MMOL/L — SIGNIFICANT CHANGE UP (ref 3.5–5.1)
POTASSIUM SERPL-MCNC: 4.1 MMOL/L — SIGNIFICANT CHANGE UP (ref 3.5–5.3)
POTASSIUM SERPL-MCNC: 4.1 MMOL/L — SIGNIFICANT CHANGE UP (ref 3.5–5.3)
POTASSIUM SERPL-MCNC: 4.4 MMOL/L — SIGNIFICANT CHANGE UP (ref 3.5–5.3)
POTASSIUM SERPL-MCNC: 4.4 MMOL/L — SIGNIFICANT CHANGE UP (ref 3.5–5.3)
POTASSIUM SERPL-MCNC: 4.6 MMOL/L — SIGNIFICANT CHANGE UP (ref 3.5–5.3)
POTASSIUM SERPL-MCNC: 4.6 MMOL/L — SIGNIFICANT CHANGE UP (ref 3.5–5.3)
POTASSIUM SERPL-MCNC: 4.9 MMOL/L — SIGNIFICANT CHANGE UP (ref 3.5–5.3)
POTASSIUM SERPL-MCNC: 4.9 MMOL/L — SIGNIFICANT CHANGE UP (ref 3.5–5.3)
POTASSIUM SERPL-MCNC: 5 MMOL/L — SIGNIFICANT CHANGE UP (ref 3.5–5.3)
POTASSIUM SERPL-MCNC: 5 MMOL/L — SIGNIFICANT CHANGE UP (ref 3.5–5.3)
POTASSIUM SERPL-MCNC: SIGNIFICANT CHANGE UP (ref 3.5–5.3)
POTASSIUM SERPL-MCNC: SIGNIFICANT CHANGE UP (ref 3.5–5.3)
POTASSIUM SERPL-SCNC: 4.1 MMOL/L — SIGNIFICANT CHANGE UP (ref 3.5–5.3)
POTASSIUM SERPL-SCNC: 4.1 MMOL/L — SIGNIFICANT CHANGE UP (ref 3.5–5.3)
POTASSIUM SERPL-SCNC: 4.4 MMOL/L — SIGNIFICANT CHANGE UP (ref 3.5–5.3)
POTASSIUM SERPL-SCNC: 4.4 MMOL/L — SIGNIFICANT CHANGE UP (ref 3.5–5.3)
POTASSIUM SERPL-SCNC: 4.6 MMOL/L — SIGNIFICANT CHANGE UP (ref 3.5–5.3)
POTASSIUM SERPL-SCNC: 4.6 MMOL/L — SIGNIFICANT CHANGE UP (ref 3.5–5.3)
POTASSIUM SERPL-SCNC: 4.7 MMOL/L
POTASSIUM SERPL-SCNC: 4.7 MMOL/L
POTASSIUM SERPL-SCNC: 4.9 MMOL/L — SIGNIFICANT CHANGE UP (ref 3.5–5.3)
POTASSIUM SERPL-SCNC: 4.9 MMOL/L — SIGNIFICANT CHANGE UP (ref 3.5–5.3)
POTASSIUM SERPL-SCNC: 5 MMOL/L — SIGNIFICANT CHANGE UP (ref 3.5–5.3)
POTASSIUM SERPL-SCNC: 5 MMOL/L — SIGNIFICANT CHANGE UP (ref 3.5–5.3)
POTASSIUM SERPL-SCNC: 5.3 MMOL/L
POTASSIUM SERPL-SCNC: 5.3 MMOL/L
POTASSIUM SERPL-SCNC: 5.4 MMOL/L
POTASSIUM SERPL-SCNC: 5.5 MMOL/L
POTASSIUM SERPL-SCNC: 5.5 MMOL/L
POTASSIUM SERPL-SCNC: 5.7 MMOL/L
POTASSIUM SERPL-SCNC: 5.8 MMOL/L
POTASSIUM SERPL-SCNC: 5.9 MMOL/L
POTASSIUM SERPL-SCNC: 6 MMOL/L
POTASSIUM SERPL-SCNC: SIGNIFICANT CHANGE UP (ref 3.5–5.3)
POTASSIUM SERPL-SCNC: SIGNIFICANT CHANGE UP (ref 3.5–5.3)
POTASSIUM UR-SCNC: 46 MMOL/L — SIGNIFICANT CHANGE UP
POTASSIUM UR-SCNC: 46 MMOL/L — SIGNIFICANT CHANGE UP
PROT ?TM UR-MCNC: 50 MG/DL — HIGH (ref 0–12)
PROT ?TM UR-MCNC: 50 MG/DL — HIGH (ref 0–12)
PROT SERPL-MCNC: 7.4 G/DL — SIGNIFICANT CHANGE UP (ref 6–8.3)
PROT SERPL-MCNC: 7.4 G/DL — SIGNIFICANT CHANGE UP (ref 6–8.3)
PROT SERPL-MCNC: 7.5 G/DL
PROT SERPL-MCNC: 7.7 G/DL
PROT SERPL-MCNC: 8 G/DL — SIGNIFICANT CHANGE UP (ref 6–8.3)
PROT SERPL-MCNC: 8 G/DL — SIGNIFICANT CHANGE UP (ref 6–8.3)
PROT SERPL-MCNC: 8.3 G/DL
PROT SERPL-MCNC: 8.6 G/DL
PROT SERPL-MCNC: 8.7 G/DL
PROT UR-MCNC: 30 MG/DL
PROT UR-MCNC: 30 MG/DL
PROT/CREAT UR-RTO: 1.2 RATIO — HIGH (ref 0–0.2)
PROT/CREAT UR-RTO: 1.2 RATIO — HIGH (ref 0–0.2)
PROTHROM AB SERPL-ACNC: 10.8 SEC — SIGNIFICANT CHANGE UP (ref 9.5–13)
PROTHROM AB SERPL-ACNC: 10.8 SEC — SIGNIFICANT CHANGE UP (ref 9.5–13)
PSA SERPL-MCNC: 0.42 NG/ML
RAPID RVP RESULT: SIGNIFICANT CHANGE UP
RAPID RVP RESULT: SIGNIFICANT CHANGE UP
RBC # BLD: 4.2 M/UL — SIGNIFICANT CHANGE UP (ref 4.2–5.8)
RBC # BLD: 4.2 M/UL — SIGNIFICANT CHANGE UP (ref 4.2–5.8)
RBC # BLD: 4.23 M/UL — SIGNIFICANT CHANGE UP (ref 4.2–5.8)
RBC # BLD: 4.23 M/UL — SIGNIFICANT CHANGE UP (ref 4.2–5.8)
RBC # BLD: 4.54 M/UL — SIGNIFICANT CHANGE UP (ref 4.2–5.8)
RBC # BLD: 4.54 M/UL — SIGNIFICANT CHANGE UP (ref 4.2–5.8)
RBC # BLD: 4.62 M/UL
RBC # BLD: 4.69 M/UL
RBC # BLD: 4.9 M/UL — SIGNIFICANT CHANGE UP (ref 4.2–5.8)
RBC # BLD: 4.9 M/UL — SIGNIFICANT CHANGE UP (ref 4.2–5.8)
RBC # BLD: 5.14 M/UL
RBC # BLD: 5.31 M/UL
RBC # FLD: 12.9 % — SIGNIFICANT CHANGE UP (ref 10.3–14.5)
RBC # FLD: 12.9 % — SIGNIFICANT CHANGE UP (ref 10.3–14.5)
RBC # FLD: 13.1 %
RBC # FLD: 13.1 % — SIGNIFICANT CHANGE UP (ref 10.3–14.5)
RBC # FLD: 13.1 % — SIGNIFICANT CHANGE UP (ref 10.3–14.5)
RBC # FLD: 13.2 % — SIGNIFICANT CHANGE UP (ref 10.3–14.5)
RBC # FLD: 13.5 %
RBC # FLD: 13.6 %
RBC # FLD: 14.6 %
RBC CASTS # UR COMP ASSIST: 2 /HPF — SIGNIFICANT CHANGE UP (ref 0–4)
RBC CASTS # UR COMP ASSIST: 2 /HPF — SIGNIFICANT CHANGE UP (ref 0–4)
RH IG SCN BLD-IMP: POSITIVE — SIGNIFICANT CHANGE UP
RH IG SCN BLD-IMP: POSITIVE — SIGNIFICANT CHANGE UP
S AUREUS DNA NOSE QL NAA+PROBE: NEGATIVE — SIGNIFICANT CHANGE UP
S AUREUS DNA NOSE QL NAA+PROBE: NEGATIVE — SIGNIFICANT CHANGE UP
S PNEUM AG UR QL: NEGATIVE — SIGNIFICANT CHANGE UP
S PNEUM AG UR QL: NEGATIVE — SIGNIFICANT CHANGE UP
SAO2 % BLDV: 68.3 % — SIGNIFICANT CHANGE UP (ref 67–88)
SAO2 % BLDV: 68.3 % — SIGNIFICANT CHANGE UP (ref 67–88)
SARS-COV-2 RNA SPEC QL NAA+PROBE: SIGNIFICANT CHANGE UP
SARS-COV-2 RNA SPEC QL NAA+PROBE: SIGNIFICANT CHANGE UP
SODIUM SERPL-SCNC: 128 MMOL/L
SODIUM SERPL-SCNC: 130 MMOL/L
SODIUM SERPL-SCNC: 130 MMOL/L
SODIUM SERPL-SCNC: 131 MMOL/L
SODIUM SERPL-SCNC: 131 MMOL/L
SODIUM SERPL-SCNC: 131 MMOL/L — LOW (ref 135–145)
SODIUM SERPL-SCNC: 131 MMOL/L — LOW (ref 135–145)
SODIUM SERPL-SCNC: 132 MMOL/L
SODIUM SERPL-SCNC: 133 MMOL/L
SODIUM SERPL-SCNC: 133 MMOL/L
SODIUM SERPL-SCNC: 133 MMOL/L — LOW (ref 135–145)
SODIUM SERPL-SCNC: 134 MMOL/L
SODIUM SERPL-SCNC: 134 MMOL/L — LOW (ref 135–145)
SODIUM SERPL-SCNC: 135 MMOL/L — SIGNIFICANT CHANGE UP (ref 135–145)
SODIUM SERPL-SCNC: 135 MMOL/L — SIGNIFICANT CHANGE UP (ref 135–145)
SODIUM UR-SCNC: 31 MMOL/L — SIGNIFICANT CHANGE UP
SODIUM UR-SCNC: 31 MMOL/L — SIGNIFICANT CHANGE UP
SP GR SPEC: >1.03 — HIGH (ref 1–1.03)
SP GR SPEC: >1.03 — HIGH (ref 1–1.03)
SPECIMEN SOURCE: SIGNIFICANT CHANGE UP
SQUAMOUS # UR AUTO: 2 /HPF — SIGNIFICANT CHANGE UP (ref 0–5)
SQUAMOUS # UR AUTO: 2 /HPF — SIGNIFICANT CHANGE UP (ref 0–5)
T PALLIDUM AB SER QL IA: NEGATIVE
T4 FREE SERPL-MCNC: 1.1 NG/DL
T4 FREE SERPL-MCNC: 1.3 NG/DL
TRANS CELLS #/AREA URNS HPF: PRESENT
TRANS CELLS #/AREA URNS HPF: PRESENT
TRIGL SERPL-MCNC: 85 MG/DL
TROPONIN T, HIGH SENSITIVITY RESULT: 32 NG/L — SIGNIFICANT CHANGE UP (ref 0–51)
TROPONIN T, HIGH SENSITIVITY RESULT: 32 NG/L — SIGNIFICANT CHANGE UP (ref 0–51)
TROPONIN T, HIGH SENSITIVITY RESULT: 34 NG/L — SIGNIFICANT CHANGE UP (ref 0–51)
TROPONIN T, HIGH SENSITIVITY RESULT: 34 NG/L — SIGNIFICANT CHANGE UP (ref 0–51)
TSH SERPL-ACNC: 2.57 UIU/ML
TSH SERPL-ACNC: 3.69 UIU/ML
UROBILINOGEN FLD QL: 0.2 MG/DL — SIGNIFICANT CHANGE UP (ref 0.2–1)
UROBILINOGEN FLD QL: 0.2 MG/DL — SIGNIFICANT CHANGE UP (ref 0.2–1)
UUN UR-MCNC: 676 MG/DL — SIGNIFICANT CHANGE UP
UUN UR-MCNC: 676 MG/DL — SIGNIFICANT CHANGE UP
WBC # BLD: 10.56 K/UL — HIGH (ref 3.8–10.5)
WBC # BLD: 10.56 K/UL — HIGH (ref 3.8–10.5)
WBC # BLD: 14.12 K/UL — HIGH (ref 3.8–10.5)
WBC # BLD: 14.12 K/UL — HIGH (ref 3.8–10.5)
WBC # BLD: 17.41 K/UL — HIGH (ref 3.8–10.5)
WBC # BLD: 17.41 K/UL — HIGH (ref 3.8–10.5)
WBC # BLD: 8.33 K/UL — SIGNIFICANT CHANGE UP (ref 3.8–10.5)
WBC # BLD: 8.33 K/UL — SIGNIFICANT CHANGE UP (ref 3.8–10.5)
WBC # FLD AUTO: 10.05 K/UL
WBC # FLD AUTO: 10.56 K/UL — HIGH (ref 3.8–10.5)
WBC # FLD AUTO: 10.56 K/UL — HIGH (ref 3.8–10.5)
WBC # FLD AUTO: 12.16 K/UL
WBC # FLD AUTO: 14.12 K/UL — HIGH (ref 3.8–10.5)
WBC # FLD AUTO: 14.12 K/UL — HIGH (ref 3.8–10.5)
WBC # FLD AUTO: 14.32 K/UL
WBC # FLD AUTO: 15.08 K/UL
WBC # FLD AUTO: 17.41 K/UL — HIGH (ref 3.8–10.5)
WBC # FLD AUTO: 17.41 K/UL — HIGH (ref 3.8–10.5)
WBC # FLD AUTO: 8.33 K/UL — SIGNIFICANT CHANGE UP (ref 3.8–10.5)
WBC # FLD AUTO: 8.33 K/UL — SIGNIFICANT CHANGE UP (ref 3.8–10.5)
WBC UR QL: 15 /HPF — HIGH (ref 0–5)
WBC UR QL: 15 /HPF — HIGH (ref 0–5)

## 2023-01-01 PROCEDURE — ZZZZZ: CPT

## 2023-01-01 PROCEDURE — 69210 REMOVE IMPACTED EAR WAX UNI: CPT | Mod: LT

## 2023-01-01 PROCEDURE — 93306 TTE W/DOPPLER COMPLETE: CPT

## 2023-01-01 PROCEDURE — 96375 TX/PRO/DX INJ NEW DRUG ADDON: CPT

## 2023-01-01 PROCEDURE — 70551 MRI BRAIN STEM W/O DYE: CPT

## 2023-01-01 PROCEDURE — 99223 1ST HOSP IP/OBS HIGH 75: CPT | Mod: GC

## 2023-01-01 PROCEDURE — 99214 OFFICE O/P EST MOD 30 MIN: CPT | Mod: 25,GC

## 2023-01-01 PROCEDURE — 92610 EVALUATE SWALLOWING FUNCTION: CPT

## 2023-01-01 PROCEDURE — 36415 COLL VENOUS BLD VENIPUNCTURE: CPT

## 2023-01-01 PROCEDURE — 90472 IMMUNIZATION ADMIN EACH ADD: CPT

## 2023-01-01 PROCEDURE — 93000 ELECTROCARDIOGRAM COMPLETE: CPT

## 2023-01-01 PROCEDURE — 94726 PLETHYSMOGRAPHY LUNG VOLUMES: CPT

## 2023-01-01 PROCEDURE — 82330 ASSAY OF CALCIUM: CPT

## 2023-01-01 PROCEDURE — 99213 OFFICE O/P EST LOW 20 MIN: CPT

## 2023-01-01 PROCEDURE — 80048 BASIC METABOLIC PNL TOTAL CA: CPT

## 2023-01-01 PROCEDURE — 83935 ASSAY OF URINE OSMOLALITY: CPT

## 2023-01-01 PROCEDURE — 70551 MRI BRAIN STEM W/O DYE: CPT | Mod: 26,MH

## 2023-01-01 PROCEDURE — 99214 OFFICE O/P EST MOD 30 MIN: CPT

## 2023-01-01 PROCEDURE — 94618 PULMONARY STRESS TESTING: CPT

## 2023-01-01 PROCEDURE — 99285 EMERGENCY DEPT VISIT HI MDM: CPT

## 2023-01-01 PROCEDURE — 99232 SBSQ HOSP IP/OBS MODERATE 35: CPT | Mod: GC

## 2023-01-01 PROCEDURE — 71250 CT THORAX DX C-: CPT

## 2023-01-01 PROCEDURE — 71250 CT THORAX DX C-: CPT | Mod: 26,MH

## 2023-01-01 PROCEDURE — 84132 ASSAY OF SERUM POTASSIUM: CPT

## 2023-01-01 PROCEDURE — 99214 OFFICE O/P EST MOD 30 MIN: CPT | Mod: 25

## 2023-01-01 PROCEDURE — 99215 OFFICE O/P EST HI 40 MIN: CPT | Mod: 25

## 2023-01-01 PROCEDURE — 86901 BLOOD TYPING SEROLOGIC RH(D): CPT

## 2023-01-01 PROCEDURE — 83880 ASSAY OF NATRIURETIC PEPTIDE: CPT

## 2023-01-01 PROCEDURE — 84300 ASSAY OF URINE SODIUM: CPT

## 2023-01-01 PROCEDURE — 84540 ASSAY OF URINE/UREA-N: CPT

## 2023-01-01 PROCEDURE — 80053 COMPREHEN METABOLIC PANEL: CPT

## 2023-01-01 PROCEDURE — 99233 SBSQ HOSP IP/OBS HIGH 50: CPT | Mod: GC

## 2023-01-01 PROCEDURE — 86900 BLOOD TYPING SEROLOGIC ABO: CPT

## 2023-01-01 PROCEDURE — 82803 BLOOD GASES ANY COMBINATION: CPT

## 2023-01-01 PROCEDURE — 93970 EXTREMITY STUDY: CPT | Mod: 26

## 2023-01-01 PROCEDURE — 87640 STAPH A DNA AMP PROBE: CPT

## 2023-01-01 PROCEDURE — 84295 ASSAY OF SERUM SODIUM: CPT

## 2023-01-01 PROCEDURE — 87641 MR-STAPH DNA AMP PROBE: CPT

## 2023-01-01 PROCEDURE — 74220 X-RAY XM ESOPHAGUS 1CNTRST: CPT

## 2023-01-01 PROCEDURE — 99215 OFFICE O/P EST HI 40 MIN: CPT | Mod: 25,GC

## 2023-01-01 PROCEDURE — 74230 X-RAY XM SWLNG FUNCJ C+: CPT | Mod: 26

## 2023-01-01 PROCEDURE — 93228 REMOTE 30 DAY ECG REV/REPORT: CPT

## 2023-01-01 PROCEDURE — 31579 LARYNGOSCOPY TELESCOPIC: CPT

## 2023-01-01 PROCEDURE — G0008: CPT

## 2023-01-01 PROCEDURE — 87899 AGENT NOS ASSAY W/OPTIC: CPT

## 2023-01-01 PROCEDURE — 83036 HEMOGLOBIN GLYCOSYLATED A1C: CPT

## 2023-01-01 PROCEDURE — 93306 TTE W/DOPPLER COMPLETE: CPT | Mod: 26

## 2023-01-01 PROCEDURE — 80076 HEPATIC FUNCTION PANEL: CPT

## 2023-01-01 PROCEDURE — 71046 X-RAY EXAM CHEST 2 VIEWS: CPT | Mod: 26

## 2023-01-01 PROCEDURE — 84156 ASSAY OF PROTEIN URINE: CPT

## 2023-01-01 PROCEDURE — 99397 PER PM REEVAL EST PAT 65+ YR: CPT | Mod: GY,25

## 2023-01-01 PROCEDURE — 90715 TDAP VACCINE 7 YRS/> IM: CPT | Mod: GY

## 2023-01-01 PROCEDURE — 87449 NOS EACH ORGANISM AG IA: CPT

## 2023-01-01 PROCEDURE — 90662 IIV NO PRSV INCREASED AG IM: CPT

## 2023-01-01 PROCEDURE — 99239 HOSP IP/OBS DSCHRG MGMT >30: CPT

## 2023-01-01 PROCEDURE — 94727 GAS DIL/WSHOT DETER LNG VOL: CPT

## 2023-01-01 PROCEDURE — 86803 HEPATITIS C AB TEST: CPT

## 2023-01-01 PROCEDURE — 94010 BREATHING CAPACITY TEST: CPT

## 2023-01-01 PROCEDURE — 99215 OFFICE O/P EST HI 40 MIN: CPT | Mod: GC,25

## 2023-01-01 PROCEDURE — 71275 CT ANGIOGRAPHY CHEST: CPT | Mod: 26,MA

## 2023-01-01 PROCEDURE — 74230 X-RAY XM SWLNG FUNCJ C+: CPT

## 2023-01-01 PROCEDURE — 93970 EXTREMITY STUDY: CPT

## 2023-01-01 PROCEDURE — 0225U NFCT DS DNA&RNA 21 SARSCOV2: CPT

## 2023-01-01 PROCEDURE — 85025 COMPLETE CBC W/AUTO DIFF WBC: CPT

## 2023-01-01 PROCEDURE — 87040 BLOOD CULTURE FOR BACTERIA: CPT

## 2023-01-01 PROCEDURE — 96365 THER/PROPH/DIAG IV INF INIT: CPT

## 2023-01-01 PROCEDURE — 99349 HOME/RES VST EST MOD MDM 40: CPT | Mod: 95

## 2023-01-01 PROCEDURE — 82962 GLUCOSE BLOOD TEST: CPT

## 2023-01-01 PROCEDURE — 81001 URINALYSIS AUTO W/SCOPE: CPT

## 2023-01-01 PROCEDURE — 71275 CT ANGIOGRAPHY CHEST: CPT | Mod: MA

## 2023-01-01 PROCEDURE — 70371 SPEECH EVALUATION COMPLEX: CPT | Mod: 59

## 2023-01-01 PROCEDURE — 99204 OFFICE O/P NEW MOD 45 MIN: CPT | Mod: 25

## 2023-01-01 PROCEDURE — 83735 ASSAY OF MAGNESIUM: CPT

## 2023-01-01 PROCEDURE — 85027 COMPLETE CBC AUTOMATED: CPT

## 2023-01-01 PROCEDURE — 99215 OFFICE O/P EST HI 40 MIN: CPT

## 2023-01-01 PROCEDURE — 84100 ASSAY OF PHOSPHORUS: CPT

## 2023-01-01 PROCEDURE — 74220 X-RAY XM ESOPHAGUS 1CNTRST: CPT | Mod: 26

## 2023-01-01 PROCEDURE — 87086 URINE CULTURE/COLONY COUNT: CPT

## 2023-01-01 PROCEDURE — 86850 RBC ANTIBODY SCREEN: CPT

## 2023-01-01 PROCEDURE — 82570 ASSAY OF URINE CREATININE: CPT

## 2023-01-01 PROCEDURE — 84484 ASSAY OF TROPONIN QUANT: CPT

## 2023-01-01 PROCEDURE — 85730 THROMBOPLASTIN TIME PARTIAL: CPT

## 2023-01-01 PROCEDURE — 71046 X-RAY EXAM CHEST 2 VIEWS: CPT

## 2023-01-01 PROCEDURE — 92611 MOTION FLUOROSCOPY/SWALLOW: CPT

## 2023-01-01 PROCEDURE — 85610 PROTHROMBIN TIME: CPT

## 2023-01-01 PROCEDURE — 97161 PT EVAL LOW COMPLEX 20 MIN: CPT

## 2023-01-01 PROCEDURE — 84133 ASSAY OF URINE POTASSIUM: CPT

## 2023-01-01 RX ORDER — IRBESARTAN 300 MG/1
300 TABLET ORAL DAILY
Qty: 90 | Refills: 3 | Status: COMPLETED | COMMUNITY
Start: 2019-03-26 | End: 2023-01-01

## 2023-01-01 RX ORDER — AMLODIPINE BESYLATE 2.5 MG/1
1 TABLET ORAL
Qty: 0 | Refills: 0 | DISCHARGE

## 2023-01-01 RX ORDER — CARVEDILOL 6.25 MG/1
6.25 TABLET, FILM COATED ORAL
Qty: 180 | Refills: 3 | Status: COMPLETED | COMMUNITY
Start: 2020-08-05 | End: 2023-01-01

## 2023-01-01 RX ORDER — BLOOD SUGAR DIAGNOSTIC
STRIP MISCELLANEOUS
Qty: 200 | Refills: 3 | Status: ACTIVE | COMMUNITY
Start: 2017-04-27 | End: 1900-01-01

## 2023-01-01 RX ORDER — ENOXAPARIN SODIUM 100 MG/ML
30 INJECTION SUBCUTANEOUS EVERY 24 HOURS
Refills: 0 | Status: DISCONTINUED | OUTPATIENT
Start: 2023-01-01 | End: 2023-01-01

## 2023-01-01 RX ORDER — PATIROMER 8.4 G/1
8.4 POWDER, FOR SUSPENSION ORAL
Qty: 30 | Refills: 3 | Status: DISCONTINUED | COMMUNITY
Start: 2022-08-25 | End: 2023-01-01

## 2023-01-01 RX ORDER — PANTOPRAZOLE 40 MG/1
40 TABLET, DELAYED RELEASE ORAL
Qty: 1 | Refills: 3 | Status: ACTIVE | COMMUNITY
Start: 2023-01-01 | End: 1900-01-01

## 2023-01-01 RX ORDER — INSULIN LISPRO 100/ML
VIAL (ML) SUBCUTANEOUS
Refills: 0 | Status: DISCONTINUED | OUTPATIENT
Start: 2023-01-01 | End: 2023-01-01

## 2023-01-01 RX ORDER — IRBESARTAN 75 MG/1
1 TABLET ORAL
Qty: 0 | Refills: 0 | DISCHARGE

## 2023-01-01 RX ORDER — EMPAGLIFLOZIN 10 MG/1
0 TABLET, FILM COATED ORAL
Qty: 0 | Refills: 0 | DISCHARGE

## 2023-01-01 RX ORDER — SENNOSIDES 8.6 MG TABLETS 8.6 MG/1
8.6 TABLET ORAL
Qty: 60 | Refills: 0 | Status: ACTIVE | COMMUNITY
Start: 2023-01-01 | End: 1900-01-01

## 2023-01-01 RX ORDER — LEVOFLOXACIN 5 MG/ML
1 INJECTION, SOLUTION INTRAVENOUS
Qty: 1 | Refills: 1
Start: 2023-01-01 | End: 2023-01-01

## 2023-01-01 RX ORDER — SODIUM CHLORIDE 9 MG/ML
1000 INJECTION, SOLUTION INTRAVENOUS
Refills: 0 | Status: DISCONTINUED | OUTPATIENT
Start: 2023-01-01 | End: 2023-01-01

## 2023-01-01 RX ORDER — METFORMIN HYDROCHLORIDE 1000 MG/1
1000 TABLET, FILM COATED, EXTENDED RELEASE ORAL
Qty: 180 | Refills: 3 | Status: DISCONTINUED | COMMUNITY
Start: 2017-12-15 | End: 2023-01-01

## 2023-01-01 RX ORDER — METOPROLOL SUCCINATE 25 MG/1
25 TABLET, EXTENDED RELEASE ORAL DAILY
Qty: 90 | Refills: 3 | Status: DISCONTINUED | COMMUNITY
Start: 2023-01-01 | End: 2023-01-01

## 2023-01-01 RX ORDER — MIRTAZAPINE 45 MG/1
1 TABLET, ORALLY DISINTEGRATING ORAL
Qty: 0 | Refills: 0 | DISCHARGE

## 2023-01-01 RX ORDER — FLUTICASONE PROPIONATE 50 UG/1
50 SPRAY, METERED NASAL TWICE DAILY
Qty: 1 | Refills: 5 | Status: ACTIVE | COMMUNITY
Start: 2023-01-01 | End: 1900-01-01

## 2023-01-01 RX ORDER — ASPIRIN 81 MG/1
81 TABLET, CHEWABLE ORAL DAILY
Qty: 90 | Refills: 3 | Status: ACTIVE | COMMUNITY
Start: 2023-01-01 | End: 1900-01-01

## 2023-01-01 RX ORDER — PIRFENIDONE 267 MG/1
1 TABLET, FILM COATED ORAL
Qty: 0 | Refills: 0 | DISCHARGE

## 2023-01-01 RX ORDER — SEMAGLUTIDE 1.34 MG/ML
2 INJECTION, SOLUTION SUBCUTANEOUS
Qty: 4 | Refills: 10 | Status: DISCONTINUED | COMMUNITY
Start: 2022-11-02 | End: 2023-01-01

## 2023-01-01 RX ORDER — DEXTROSE 50 % IN WATER 50 %
25 SYRINGE (ML) INTRAVENOUS ONCE
Refills: 0 | Status: DISCONTINUED | OUTPATIENT
Start: 2023-01-01 | End: 2023-01-01

## 2023-01-01 RX ORDER — PIPERACILLIN AND TAZOBACTAM 4; .5 G/20ML; G/20ML
4.5 INJECTION, POWDER, LYOPHILIZED, FOR SOLUTION INTRAVENOUS ONCE
Refills: 0 | Status: COMPLETED | OUTPATIENT
Start: 2023-01-01 | End: 2023-01-01

## 2023-01-01 RX ORDER — ACETAMINOPHEN 500 MG
650 TABLET ORAL ONCE
Refills: 0 | Status: COMPLETED | OUTPATIENT
Start: 2023-01-01 | End: 2023-01-01

## 2023-01-01 RX ORDER — SITAGLIPTIN 25 MG/1
25 TABLET, FILM COATED ORAL DAILY
Qty: 30 | Refills: 0 | Status: COMPLETED | COMMUNITY
Start: 2023-01-01 | End: 2023-01-01

## 2023-01-01 RX ORDER — GABAPENTIN 400 MG/1
100 CAPSULE ORAL EVERY 8 HOURS
Refills: 0 | Status: DISCONTINUED | OUTPATIENT
Start: 2023-01-01 | End: 2023-01-01

## 2023-01-01 RX ORDER — MIRTAZAPINE 15 MG/1
15 TABLET, FILM COATED ORAL AT BEDTIME
Qty: 90 | Refills: 3 | Status: ACTIVE | COMMUNITY
Start: 2023-01-01 | End: 1900-01-01

## 2023-01-01 RX ORDER — DEXTROSE 50 % IN WATER 50 %
12.5 SYRINGE (ML) INTRAVENOUS ONCE
Refills: 0 | Status: DISCONTINUED | OUTPATIENT
Start: 2023-01-01 | End: 2023-01-01

## 2023-01-01 RX ORDER — UBIDECARENONE 30 MG
30 CAPSULE ORAL DAILY
Refills: 0 | Status: DISCONTINUED | COMMUNITY
End: 2023-01-01

## 2023-01-01 RX ORDER — ASCORBIC ACID 60 MG
250 TABLET,CHEWABLE ORAL DAILY
Refills: 0 | Status: DISCONTINUED | OUTPATIENT
Start: 2023-01-01 | End: 2023-01-01

## 2023-01-01 RX ORDER — AMOXICILLIN 500 MG/1
500 TABLET, FILM COATED ORAL 3 TIMES DAILY
Qty: 15 | Refills: 0 | Status: DISCONTINUED | COMMUNITY
Start: 2023-01-01 | End: 2023-01-01

## 2023-01-01 RX ORDER — GLIPIZIDE 5 MG/1
5 TABLET ORAL TWICE DAILY
Refills: 0 | Status: DISCONTINUED | COMMUNITY
End: 2023-01-01

## 2023-01-01 RX ORDER — GLUCAGON INJECTION, SOLUTION 0.5 MG/.1ML
1 INJECTION, SOLUTION SUBCUTANEOUS ONCE
Refills: 0 | Status: DISCONTINUED | OUTPATIENT
Start: 2023-01-01 | End: 2023-01-01

## 2023-01-01 RX ORDER — BENZONATATE 200 MG/1
200 CAPSULE ORAL 3 TIMES DAILY
Qty: 90 | Refills: 3 | Status: ACTIVE | COMMUNITY
Start: 2023-01-01 | End: 1900-01-01

## 2023-01-01 RX ORDER — PANTOPRAZOLE SODIUM 20 MG/1
1 TABLET, DELAYED RELEASE ORAL
Qty: 0 | Refills: 0 | DISCHARGE

## 2023-01-01 RX ORDER — PANTOPRAZOLE SODIUM 20 MG/1
40 TABLET, DELAYED RELEASE ORAL
Refills: 0 | Status: DISCONTINUED | OUTPATIENT
Start: 2023-01-01 | End: 2023-01-01

## 2023-01-01 RX ORDER — SODIUM CHLORIDE 9 MG/ML
500 INJECTION INTRAMUSCULAR; INTRAVENOUS; SUBCUTANEOUS ONCE
Refills: 0 | Status: COMPLETED | OUTPATIENT
Start: 2023-01-01 | End: 2023-01-01

## 2023-01-01 RX ORDER — ROSUVASTATIN CALCIUM 5 MG/1
0 TABLET ORAL
Qty: 0 | Refills: 0 | DISCHARGE

## 2023-01-01 RX ORDER — MIRTAZAPINE 45 MG/1
15 TABLET, ORALLY DISINTEGRATING ORAL AT BEDTIME
Refills: 0 | Status: DISCONTINUED | OUTPATIENT
Start: 2023-01-01 | End: 2023-01-01

## 2023-01-01 RX ORDER — LEVOFLOXACIN 750 MG/1
750 TABLET, FILM COATED ORAL DAILY
Qty: 3 | Refills: 0 | Status: ACTIVE | COMMUNITY
Start: 2023-01-01 | End: 1900-01-01

## 2023-01-01 RX ORDER — PIRFENIDONE 267 MG/1
267 CAPSULE ORAL 3 TIMES DAILY
Qty: 270 | Refills: 1 | Status: ACTIVE | COMMUNITY
Start: 2023-01-01 | End: 1900-01-01

## 2023-01-01 RX ORDER — FOLIC ACID 1 MG/1
1 TABLET ORAL
Qty: 90 | Refills: 3 | Status: DISCONTINUED | COMMUNITY
End: 2023-01-01

## 2023-01-01 RX ORDER — PIPERACILLIN AND TAZOBACTAM 4; .5 G/20ML; G/20ML
4.5 INJECTION, POWDER, LYOPHILIZED, FOR SOLUTION INTRAVENOUS EVERY 8 HOURS
Refills: 0 | Status: DISCONTINUED | OUTPATIENT
Start: 2023-01-01 | End: 2023-01-01

## 2023-01-01 RX ORDER — SENNA PLUS 8.6 MG/1
2 TABLET ORAL AT BEDTIME
Refills: 0 | Status: DISCONTINUED | OUTPATIENT
Start: 2023-01-01 | End: 2023-01-01

## 2023-01-01 RX ORDER — ASPIRIN/CALCIUM CARB/MAGNESIUM 324 MG
81 TABLET ORAL DAILY
Refills: 0 | Status: DISCONTINUED | OUTPATIENT
Start: 2023-01-01 | End: 2023-01-01

## 2023-01-01 RX ORDER — AZITHROMYCIN 500 MG/1
500 TABLET, FILM COATED ORAL DAILY
Qty: 1 | Refills: 0 | Status: DISCONTINUED | COMMUNITY
Start: 2023-01-01 | End: 2023-01-01

## 2023-01-01 RX ORDER — PATIROMER 8.4 G/1
8.4 POWDER, FOR SUSPENSION ORAL
Qty: 30 | Refills: 3 | Status: ACTIVE | COMMUNITY
Start: 2023-01-01

## 2023-01-01 RX ORDER — EMPAGLIFLOZIN 25 MG/1
25 TABLET, FILM COATED ORAL DAILY
Qty: 90 | Refills: 3 | Status: ACTIVE | COMMUNITY
Start: 2020-10-14

## 2023-01-01 RX ORDER — POLYETHYLENE GLYCOL 3350 17 G/17G
17 POWDER, FOR SOLUTION ORAL DAILY
Qty: 30 | Refills: 3 | Status: ACTIVE | COMMUNITY
Start: 2023-01-01 | End: 1900-01-01

## 2023-01-01 RX ORDER — METFORMIN ER 500 MG 500 MG/1
500 TABLET ORAL
Qty: 360 | Refills: 0 | Status: DISCONTINUED | COMMUNITY
Start: 2022-12-06 | End: 2023-01-01

## 2023-01-01 RX ORDER — ATORVASTATIN CALCIUM 80 MG/1
40 TABLET, FILM COATED ORAL AT BEDTIME
Refills: 0 | Status: DISCONTINUED | OUTPATIENT
Start: 2023-01-01 | End: 2023-01-01

## 2023-01-01 RX ORDER — SODIUM POLYSTYRENE SULFONATE 15 G/60ML
15 SUSPENSION ORAL; RECTAL
Qty: 1 | Refills: 5 | Status: DISCONTINUED | COMMUNITY
Start: 2022-06-30 | End: 2023-01-01

## 2023-01-01 RX ORDER — AMLODIPINE BESYLATE 5 MG/1
5 TABLET ORAL DAILY
Refills: 0 | Status: DISCONTINUED | COMMUNITY
End: 2023-01-01

## 2023-01-01 RX ORDER — PIPERACILLIN AND TAZOBACTAM 4; .5 G/20ML; G/20ML
3.38 INJECTION, POWDER, LYOPHILIZED, FOR SOLUTION INTRAVENOUS ONCE
Refills: 0 | Status: COMPLETED | OUTPATIENT
Start: 2023-01-01 | End: 2023-01-01

## 2023-01-01 RX ORDER — PATIROMER 8.4 G/1
8.4 POWDER, FOR SUSPENSION ORAL
Qty: 1 | Refills: 5 | Status: DISCONTINUED | COMMUNITY
Start: 2023-01-01 | End: 2023-01-01

## 2023-01-01 RX ORDER — ZINC SULFATE TAB 220 MG (50 MG ZINC EQUIVALENT) 220 (50 ZN) MG
220 TAB ORAL DAILY
Refills: 0 | Status: DISCONTINUED | OUTPATIENT
Start: 2023-01-01 | End: 2023-01-01

## 2023-01-01 RX ORDER — METFORMIN HYDROCHLORIDE 500 MG/1
500 TABLET, COATED ORAL TWICE DAILY
Refills: 0 | Status: DISCONTINUED | COMMUNITY
End: 2023-01-01

## 2023-01-01 RX ORDER — DEXTROSE 50 % IN WATER 50 %
15 SYRINGE (ML) INTRAVENOUS ONCE
Refills: 0 | Status: DISCONTINUED | OUTPATIENT
Start: 2023-01-01 | End: 2023-01-01

## 2023-01-01 RX ORDER — UBIDECARENONE/VIT E ACET 100MG-5
100 CAPSULE ORAL
Qty: 90 | Refills: 3 | Status: ACTIVE | COMMUNITY
Start: 2018-10-09 | End: 1900-01-01

## 2023-01-01 RX ORDER — VANCOMYCIN HCL 1 G
1000 VIAL (EA) INTRAVENOUS ONCE
Refills: 0 | Status: COMPLETED | OUTPATIENT
Start: 2023-01-01 | End: 2023-01-01

## 2023-01-01 RX ORDER — ERGOCALCIFEROL 1.25 MG/1
1.25 MG CAPSULE, LIQUID FILLED ORAL
Qty: 4 | Refills: 0 | Status: DISCONTINUED | COMMUNITY
Start: 2017-09-07 | End: 2023-01-01

## 2023-01-01 RX ORDER — FLUTICASONE PROPIONATE 50 UG/1
50 SPRAY, METERED NASAL DAILY
Qty: 2 | Refills: 4 | Status: DISCONTINUED | COMMUNITY
Start: 2022-05-19 | End: 2023-01-01

## 2023-01-01 RX ORDER — ROSUVASTATIN CALCIUM 10 MG/1
10 TABLET, FILM COATED ORAL
Qty: 90 | Refills: 3 | Status: ACTIVE | COMMUNITY
Start: 2021-06-30 | End: 1900-01-01

## 2023-01-01 RX ORDER — ENOXAPARIN SODIUM 100 MG/ML
40 INJECTION SUBCUTANEOUS EVERY 24 HOURS
Refills: 0 | Status: DISCONTINUED | OUTPATIENT
Start: 2023-01-01 | End: 2023-01-01

## 2023-01-01 RX ORDER — PIRFENIDONE 267 MG/1
801 TABLET, FILM COATED ORAL THREE TIMES A DAY
Refills: 0 | Status: DISCONTINUED | OUTPATIENT
Start: 2023-01-01 | End: 2023-01-01

## 2023-01-01 RX ADMIN — Medication 250 MILLIGRAM(S): at 17:45

## 2023-01-01 RX ADMIN — PANTOPRAZOLE SODIUM 40 MILLIGRAM(S): 20 TABLET, DELAYED RELEASE ORAL at 06:02

## 2023-01-01 RX ADMIN — Medication 1 TABLET(S): at 11:21

## 2023-01-01 RX ADMIN — ZINC SULFATE TAB 220 MG (50 MG ZINC EQUIVALENT) 220 MILLIGRAM(S): 220 (50 ZN) TAB at 11:21

## 2023-01-01 RX ADMIN — SODIUM CHLORIDE 500 MILLILITER(S): 9 INJECTION INTRAMUSCULAR; INTRAVENOUS; SUBCUTANEOUS at 17:30

## 2023-01-01 RX ADMIN — GABAPENTIN 100 MILLIGRAM(S): 400 CAPSULE ORAL at 19:16

## 2023-01-01 RX ADMIN — PIPERACILLIN AND TAZOBACTAM 200 GRAM(S): 4; .5 INJECTION, POWDER, LYOPHILIZED, FOR SOLUTION INTRAVENOUS at 17:11

## 2023-01-01 RX ADMIN — Medication 650 MILLIGRAM(S): at 19:57

## 2023-01-01 RX ADMIN — PIPERACILLIN AND TAZOBACTAM 25 GRAM(S): 4; .5 INJECTION, POWDER, LYOPHILIZED, FOR SOLUTION INTRAVENOUS at 14:53

## 2023-01-01 RX ADMIN — Medication 250 MILLIGRAM(S): at 11:59

## 2023-01-01 RX ADMIN — MIRTAZAPINE 15 MILLIGRAM(S): 45 TABLET, ORALLY DISINTEGRATING ORAL at 21:48

## 2023-01-01 RX ADMIN — MIRTAZAPINE 15 MILLIGRAM(S): 45 TABLET, ORALLY DISINTEGRATING ORAL at 22:37

## 2023-01-01 RX ADMIN — ZINC SULFATE TAB 220 MG (50 MG ZINC EQUIVALENT) 220 MILLIGRAM(S): 220 (50 ZN) TAB at 11:59

## 2023-01-01 RX ADMIN — PIPERACILLIN AND TAZOBACTAM 25 GRAM(S): 4; .5 INJECTION, POWDER, LYOPHILIZED, FOR SOLUTION INTRAVENOUS at 06:02

## 2023-01-01 RX ADMIN — PIRFENIDONE 801 MILLIGRAM(S): 267 TABLET, FILM COATED ORAL at 21:28

## 2023-01-01 RX ADMIN — Medication 12: at 22:37

## 2023-01-01 RX ADMIN — MIRTAZAPINE 15 MILLIGRAM(S): 45 TABLET, ORALLY DISINTEGRATING ORAL at 21:20

## 2023-01-01 RX ADMIN — Medication 250 MILLIGRAM(S): at 17:29

## 2023-01-01 RX ADMIN — Medication 81 MILLIGRAM(S): at 11:00

## 2023-01-01 RX ADMIN — PIPERACILLIN AND TAZOBACTAM 3.38 GRAM(S): 4; .5 INJECTION, POWDER, LYOPHILIZED, FOR SOLUTION INTRAVENOUS at 18:49

## 2023-01-01 RX ADMIN — Medication 81 MILLIGRAM(S): at 11:15

## 2023-01-01 RX ADMIN — GABAPENTIN 100 MILLIGRAM(S): 400 CAPSULE ORAL at 12:01

## 2023-01-01 RX ADMIN — PIRFENIDONE 801 MILLIGRAM(S): 267 TABLET, FILM COATED ORAL at 06:06

## 2023-01-01 RX ADMIN — Medication 4: at 14:41

## 2023-01-01 RX ADMIN — PIRFENIDONE 801 MILLIGRAM(S): 267 TABLET, FILM COATED ORAL at 15:29

## 2023-01-01 RX ADMIN — Medication 1 TABLET(S): at 11:15

## 2023-01-01 RX ADMIN — GABAPENTIN 100 MILLIGRAM(S): 400 CAPSULE ORAL at 01:01

## 2023-01-01 RX ADMIN — PIRFENIDONE 801 MILLIGRAM(S): 267 TABLET, FILM COATED ORAL at 13:53

## 2023-01-01 RX ADMIN — Medication 650 MILLIGRAM(S): at 18:57

## 2023-01-01 RX ADMIN — Medication 250 MILLIGRAM(S): at 11:21

## 2023-01-01 RX ADMIN — PIRFENIDONE 801 MILLIGRAM(S): 267 TABLET, FILM COATED ORAL at 23:37

## 2023-01-01 RX ADMIN — PIPERACILLIN AND TAZOBACTAM 25 GRAM(S): 4; .5 INJECTION, POWDER, LYOPHILIZED, FOR SOLUTION INTRAVENOUS at 14:04

## 2023-01-01 RX ADMIN — PANTOPRAZOLE SODIUM 40 MILLIGRAM(S): 20 TABLET, DELAYED RELEASE ORAL at 06:44

## 2023-01-01 RX ADMIN — PIPERACILLIN AND TAZOBACTAM 25 GRAM(S): 4; .5 INJECTION, POWDER, LYOPHILIZED, FOR SOLUTION INTRAVENOUS at 22:03

## 2023-01-01 RX ADMIN — GABAPENTIN 100 MILLIGRAM(S): 400 CAPSULE ORAL at 11:15

## 2023-01-01 RX ADMIN — PIRFENIDONE 801 MILLIGRAM(S): 267 TABLET, FILM COATED ORAL at 21:48

## 2023-01-01 RX ADMIN — PIRFENIDONE 801 MILLIGRAM(S): 267 TABLET, FILM COATED ORAL at 22:37

## 2023-01-01 RX ADMIN — PIRFENIDONE 801 MILLIGRAM(S): 267 TABLET, FILM COATED ORAL at 14:56

## 2023-01-01 RX ADMIN — ATORVASTATIN CALCIUM 40 MILLIGRAM(S): 80 TABLET, FILM COATED ORAL at 22:37

## 2023-01-01 RX ADMIN — Medication 81 MILLIGRAM(S): at 11:59

## 2023-01-01 RX ADMIN — Medication 250 MILLIGRAM(S): at 11:15

## 2023-01-01 RX ADMIN — PIPERACILLIN AND TAZOBACTAM 25 GRAM(S): 4; .5 INJECTION, POWDER, LYOPHILIZED, FOR SOLUTION INTRAVENOUS at 22:38

## 2023-01-01 RX ADMIN — PANTOPRAZOLE SODIUM 40 MILLIGRAM(S): 20 TABLET, DELAYED RELEASE ORAL at 06:06

## 2023-01-01 RX ADMIN — PIRFENIDONE 801 MILLIGRAM(S): 267 TABLET, FILM COATED ORAL at 14:29

## 2023-01-01 RX ADMIN — Medication 1 TABLET(S): at 11:59

## 2023-01-01 RX ADMIN — PIPERACILLIN AND TAZOBACTAM 25 GRAM(S): 4; .5 INJECTION, POWDER, LYOPHILIZED, FOR SOLUTION INTRAVENOUS at 21:49

## 2023-01-01 RX ADMIN — PANTOPRAZOLE SODIUM 40 MILLIGRAM(S): 20 TABLET, DELAYED RELEASE ORAL at 06:11

## 2023-01-01 RX ADMIN — Medication 2: at 18:35

## 2023-01-01 RX ADMIN — GABAPENTIN 100 MILLIGRAM(S): 400 CAPSULE ORAL at 19:12

## 2023-01-01 RX ADMIN — GABAPENTIN 100 MILLIGRAM(S): 400 CAPSULE ORAL at 06:11

## 2023-01-01 RX ADMIN — PIRFENIDONE 801 MILLIGRAM(S): 267 TABLET, FILM COATED ORAL at 06:11

## 2023-01-01 RX ADMIN — ATORVASTATIN CALCIUM 40 MILLIGRAM(S): 80 TABLET, FILM COATED ORAL at 21:19

## 2023-01-01 RX ADMIN — PIPERACILLIN AND TAZOBACTAM 25 GRAM(S): 4; .5 INJECTION, POWDER, LYOPHILIZED, FOR SOLUTION INTRAVENOUS at 21:21

## 2023-01-01 RX ADMIN — Medication 4: at 17:56

## 2023-01-01 RX ADMIN — Medication 1 TABLET(S): at 17:29

## 2023-01-01 RX ADMIN — GABAPENTIN 100 MILLIGRAM(S): 400 CAPSULE ORAL at 11:21

## 2023-01-01 RX ADMIN — PIPERACILLIN AND TAZOBACTAM 25 GRAM(S): 4; .5 INJECTION, POWDER, LYOPHILIZED, FOR SOLUTION INTRAVENOUS at 14:29

## 2023-01-01 RX ADMIN — ZINC SULFATE TAB 220 MG (50 MG ZINC EQUIVALENT) 220 MILLIGRAM(S): 220 (50 ZN) TAB at 11:14

## 2023-01-01 RX ADMIN — PIPERACILLIN AND TAZOBACTAM 25 GRAM(S): 4; .5 INJECTION, POWDER, LYOPHILIZED, FOR SOLUTION INTRAVENOUS at 05:56

## 2023-01-01 RX ADMIN — PIRFENIDONE 801 MILLIGRAM(S): 267 TABLET, FILM COATED ORAL at 06:56

## 2023-01-01 RX ADMIN — ATORVASTATIN CALCIUM 40 MILLIGRAM(S): 80 TABLET, FILM COATED ORAL at 21:48

## 2023-01-01 RX ADMIN — PIPERACILLIN AND TAZOBACTAM 25 GRAM(S): 4; .5 INJECTION, POWDER, LYOPHILIZED, FOR SOLUTION INTRAVENOUS at 06:06

## 2023-01-01 RX ADMIN — PIRFENIDONE 801 MILLIGRAM(S): 267 TABLET, FILM COATED ORAL at 21:20

## 2023-01-01 RX ADMIN — PIPERACILLIN AND TAZOBACTAM 25 GRAM(S): 4; .5 INJECTION, POWDER, LYOPHILIZED, FOR SOLUTION INTRAVENOUS at 13:53

## 2023-01-01 RX ADMIN — PIRFENIDONE 801 MILLIGRAM(S): 267 TABLET, FILM COATED ORAL at 14:53

## 2023-01-01 RX ADMIN — PIPERACILLIN AND TAZOBACTAM 25 GRAM(S): 4; .5 INJECTION, POWDER, LYOPHILIZED, FOR SOLUTION INTRAVENOUS at 06:09

## 2023-01-01 RX ADMIN — ENOXAPARIN SODIUM 30 MILLIGRAM(S): 100 INJECTION SUBCUTANEOUS at 07:30

## 2023-01-01 RX ADMIN — PIRFENIDONE 801 MILLIGRAM(S): 267 TABLET, FILM COATED ORAL at 05:56

## 2023-01-01 RX ADMIN — PIRFENIDONE 801 MILLIGRAM(S): 267 TABLET, FILM COATED ORAL at 14:04

## 2023-01-01 RX ADMIN — Medication 81 MILLIGRAM(S): at 11:21

## 2023-01-19 PROBLEM — K22.89 ESOPHAGEAL THICKENING: Status: ACTIVE | Noted: 2023-01-01

## 2023-01-19 PROBLEM — I25.10 ATHSCL HEART DISEASE OF NATIVE CORONARY ARTERY W/O ANG PCTRS: Status: ACTIVE | Noted: 2019-11-19

## 2023-01-19 PROBLEM — R53.83 TIREDNESS: Status: ACTIVE | Noted: 2023-01-01

## 2023-02-07 NOTE — ASSESSMENT
[FreeTextEntry1] : dizzines is gone his bp is controlled on irbesartan his k is only mildly elevated needs to take his veltassa \par his soboe due to covid lung he has severely diminished exercise capacity \par he has lung scar since due to covid for pulm rehab\par CAD on rosuvastatin 10 mg daily intolerant of higher doses \par he is optimized from a cardiac standpoint for pulmonary rehab\par \par fu in three months\par \par \par \par \par

## 2023-02-07 NOTE — HISTORY OF PRESENT ILLNESS
[FreeTextEntry1] : 77 M LIMA to LAD, stent to LCX and LAD HTN DM PAD calcified arteries on SYL/PVR GI bleed COVID Lung hyperkalemia\par \par \par here for fu has stable sob he sob has not improved he is now in a leave of absence he has no cp no other assoc cardic complaints he is not compliant with veltassa he is taking it three times a week if he remembers. He needs cardiac clearance for pulmonary rehab.\par \par \par \par ecg sr rbbb 2/7/2023\par echo 11/3/2021 EF normal PASP 60-65%\par stress test 1/2021 4.7 METS small reversible apical defect

## 2023-02-07 NOTE — PROCEDURE
[Tc-99m, sestamibi-Cardiolite, to 40mCi, dose-Radionuclides-[employer] code--v.1-Active-Trade] : Tc-99m, sestamibi-Cardiolite, to 40mCi, dose-Radionuclides-[employer] code--v.1-Active-Trade [ECG Atrial Arrhythmias] : no arrhythmias [de-identified] : Given via the IV route.    1 Day Protocol. Rest/Stress. SPECT. Gated. [de-identified] : 10.4 mCi [de-identified] : Given via the IV route.  Injection time and Heart Rate [de-identified] : 28.7 [de-identified] : 118 [de-identified] : 160/56 [de-identified] : occsaional PVCs, 1 couplet [de-identified] : 4.7 METS; 81 [de-identified] : 69 [de-identified] : Image Quality: Excellent\par Artifacts: None\par Comparison to prior study: N/A  [TWNoteComboBox1] : DAVID [TWNoteComboBox2] : Fabricio [TWNoteComboBox4] : dyspnea [TWNoteComboBox3] : 1 [TWNoteComboBox5] : dyspnea [Normal] : 3. No pericardial effusion. [de-identified] : Dr. Franco Johnson (card) [de-identified] : Nicole Petri-Schoener, ENOCCS [de-identified] : shortness of breath [de-identified] : 1.1 [de-identified] : 0.9 [de-identified] : 3.7 [de-identified] : 1.8 [de-identified] : 3.0 [de-identified] : 3.3 [de-identified] : 33 mmHg [de-identified] : 60-93 [de-identified] : Mild concentric left ventricular hypertrophy. Age appropriate diastolic function [de-identified] : The left atrium is normal in size. The left atrial volume index is 23 ml/m2. [FreeTextEntry1] : There is mild  posterior mitral annular calcification. There is minimal mitral regurgitation. There is no mitral stenosis. [de-identified] : There is trace pulmonic insufficiency. [de-identified] : There is minimal tricuspid regurgitation. [de-identified] : The inferior vena cava measures 1.2 cm. [de-identified] : Mitral annular calcification

## 2023-02-09 NOTE — ASSESSMENT
[FreeTextEntry1] : Data reviewed:\par \par CT 3/2022 [facility] personally reviewed : stable fibrotic disease\par PA/lat CXR in office 8/4/22: diffuse fibrotic disease, inc density on L is old\par \par PFT 5/24/22: FVC 1.35L (42%), FEV1 1.28L (57%), TLC 2.18L (37%), DLCO x / FENO 44 / 264m desat to 84%\par (Declined ambulatory O2 at that time)\par \par Impression:\par Post-Covid ILD\par \par Plan:\par I'm glad he is going to attend rehab.\par He does have exertional desaturation and has declined oxygen, as he becomes more active may benefit from ambulatory oxygen and I could rx, discussed.\par He has lost about 10 lbs. He has follow up planned w Dr Bowie.

## 2023-02-09 NOTE — HISTORY OF PRESENT ILLNESS
[TextBox_4] : 07/15/2020: Asked to evaluate patient by Dr Johnson for dyspnea. He has coronary disease. 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. 3 mos ago he had Covid - fever, myalgias, cough, no dyspnea, isolated at home, not admitted. Now Ab positive. Returned to work last month and now is dyspneic with walking. Not dyspneic at rest. Never had lung disease. Quit smoking in his 40s. Lives in [place], born in [facility].\par \par 8/6/20: Feels better than last visit. Not working but walking 30-60 min a day, w dyspnea. No other changes. Blood glucose is well controlled. Started him on 30mg of prednisone for the nonresolving infiltrates w exertional hypoxia.\par \par 8/20/20: Feels a little better on 30mg prednisone. Blood glucose is up. No other changes. Taking Nexium, no GI symptoms.\par \par 9/30/20 [Raymundo]: on Prednisone 10 mg /day now, no worsening of SOB / cough, leg swelling is better, DM -2 with elevated BG in 300-400, not on insulin. taking nexium. feels lethargic sometimes. \par \par 11/25/20 [Raymundo]: here for follow up. Had stopped steroids about 45 days ago, has been walking 3-5 k steps a day, not GAMBINO if he walks with slower pace but dyspenic on exertion,occasional cough with clear sputum if exposed to dust, No chest pain / fever. He plans to go back to work in a week. many questions regarding prevention of another infection of COVID, need for prophylactic antibiotics. \par \par 10/1/21: For some logistics reason unable to get follow up CT as planned. He is working, but is very dyspneic, very tiring, working full time. He can't retire because his daughter has been accepted to medical school. Had the Covid vaccines.\par \par 5/19/22: Daughter 2nd year med student at [employer] - he's not paying, she's taking loans. He is still working. He did online rehab without much benefit. He is very dyspneic. WHen he gets very dyspneic he also coughs. All of this is a problem at work. 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 Feels he needs to work for another 2 years.\par \par 8/4/22: Walks in asking to be seen for L chest tightness and painful cough w congestion x 3 days. Had endoscopy Monday 4 days ago. No fever, no increased dyspnea. No URI symptoms. Flonase helping. He is going to go out on disability for 6 mos and he thinks he will go back after that.\par \par 2/9/23: He will start pulm rehab at [facility] [place] [facility] [place] in March. [employer] told him he has run out of sick leave but can take a leave of absence. He's been out of work x 6 mos and 1199 told him he can stay out another 3 mos on FMLA. He's lost 10 lbs since the fall. His food has no taste. No dysphagia. No abd pain or change in bowel habits. Has follow up w Dr Bowie. Wants to return to work after rehab. Daughter 2nd year in medical school - this is also what he told me last year. [ESS] : 9

## 2023-03-22 NOTE — HISTORY OF PRESENT ILLNESS
[TextBox_4] : 78 yo M with a h/o post COVD ILD, CAD, HTN, DM, HLD, small hiatal hernia who is referred for chronic dyspnea and pulmonary rehabilitation. \par PATIENT DID NOT SHOW TO HER VISIT. \par \par Referred by Dr. Sandie Carter\par PMD: Dr. Alexis Eric\par Cardiologist: Dr. Franco Johnson\par \par Pulmonary history and symptoms:\par \par Supplemental Oxygen use:\par Smoking history:\par \par Imaging:\par CT 3/22/2022- fibrosis\par \par \par PFTs:\par \par Echo:\par EKG:\par \par Vaccines: \par COVID:\par Influenza:\par Pneumococcal:\par

## 2023-04-10 NOTE — ASSESSMENT
[FreeTextEntry1] : Data reviewed:\par \par CT 3/2022 [facility] personally reviewed : stable fibrotic disease\par PA/lat CXR in office 8/4/22: diffuse fibrotic disease, inc density on L is old\par \par PFT 5/24/22: FVC 1.35L (42%), FEV1 1.28L (57%), TLC 2.18L (37%), DLCO x / FENO 44 / 264m desat to 84%\par (Declined ambulatory O2 at that time)\par \par Impression:\par Post-Covid ILD\par \par Plan:\par He wants oxygen now. Will order.\par Will also order saline nebs for cough w thick sputum - this is only when he is sick, not chronic.\par Will write him letter for work.\par Urged him to pls attend next rehab appointment - when he no shows it gets pushed back.

## 2023-04-10 NOTE — HISTORY OF PRESENT ILLNESS
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 Quit smoking in his 40s, born in [facility], lives in [place], daughter is in medical school in [place].\par \par 2/9/23: He will start pulm rehab at [facility] [place] in March. 1199 told him he has run out of sick leave but can take a leave of absence. He's been out of work x 6 mos and 1199 told him he can stay out another 3 mos on FMLA. He's lost 10 lbs since the fall. His food has no taste. No dysphagia. No abd pain or change in bowel habits. Has follow up w Dr Bowie. Wants to return to work after rehab. Daughter 2nd year in medical school - this is also what he told me last year.\par \par 4/10/2023: He remains on FMLA leave from work and he comes in today because he got a letter that they do not have medical documentation and he is now on unpaid leave. He did not see Dr Bowie or go to rehab because he felt too dyspneic. Daughter is now in 3rd year and wife has gone to [place] so he is all alone.

## 2023-05-10 NOTE — PROCEDURE
[Tc-99m, sestamibi-Cardiolite, to 40mCi, dose-Radionuclides-[employer] code--v.1-Active-[employer]] : Tc-99m, sestamibi-Cardiolite, to 40mCi, dose-[employer] code--v.1-[employer] [ECG Atrial Arrhythmias] : no arrhythmias [de-identified] : Given via the IV route.    1 Day Protocol. Rest/Stress. SPECT. Gated. [de-identified] : 10.4 mCi [de-identified] : Given via the IV route.  Injection time and Heart Rate [de-identified] : 28.7 [de-identified] : 160/56 [de-identified] : 118 [de-identified] : occsaional PVCs, 1 couplet [de-identified] : 4.7 METS; 81 [de-identified] : 69 [de-identified] : Image Quality: Excellent\par Artifacts: None\par Comparison to prior study: N/A  [TWNoteComboBox1] : DAVID [TWNoteComboBox4] : dyspnea [TWNoteComboBox2] : Fabricio [TWNoteComboBox3] : 1 [TWNoteComboBox5] : dyspnea [Normal] : 3. No pericardial effusion. [de-identified] : Nicole Petri-Schoener, ENOCCS [de-identified] : Dr. Franco Johnson (card) [de-identified] : shortness of breath [de-identified] : 1.1 [de-identified] : 0.9 [de-identified] : 3.7 [de-identified] : 3.0 [de-identified] : 1.8 [de-identified] : 3.3 [de-identified] : 33 mmHg [de-identified] : 60-45 [de-identified] : Mild concentric left ventricular hypertrophy. Age appropriate diastolic function [FreeTextEntry1] : There is mild  posterior mitral annular calcification. There is minimal mitral regurgitation. There is no mitral stenosis. [de-identified] : The left atrium is normal in size. The left atrial volume index is 23 ml/m2. [de-identified] : There is trace pulmonic insufficiency. [de-identified] : There is minimal tricuspid regurgitation. [de-identified] : The inferior vena cava measures 1.2 cm. [de-identified] : Mitral annular calcification

## 2023-05-10 NOTE — ASSESSMENT
[FreeTextEntry1] : HTN decrease irbesartan to 150 mg daily change coreg to toprol 25 mg daily \par his soboe due to covid lung he has severely diminished exercise capacity he has not been showing up for pulmonary reghab \par CAD on rosuvastatin 10 mg daily intolerant of higher doses \par DM he has lost a lot of weight hga1c not at goal stay on current regimen started on januvia\par he will see geriatrics for possible depression did ct abd/pelvis which was ok\par repeat echo for PASP\par fu in three months\par \par \par \par \par

## 2023-05-10 NOTE — HISTORY OF PRESENT ILLNESS
[FreeTextEntry1] : 78 M LIMA to LAD, stent to LCX and LAD HTN DM PAD calcified arteries on SYL/PVR GI bleed COVID Lung hyperkalemia\par \par \par here for fu he is more sob noted have decreased oxygenation when he walks and may need portable oxygen he is tearful he appears to be mildly short of breath he has lost more weight is a lightheaded and dizzy \par \par \par \par ecg sr rbbb 5/9/2023\par echo 6/2022 EF normal PASP 60-65%\par stress test 1/2022 8.3 METS small reversible apical defect

## 2023-05-10 NOTE — PHYSICAL EXAM
[Well Developed] : well developed [Well Nourished] : well nourished [No Acute Distress] : no acute distress [Normal Venous Pressure] : normal venous pressure [No Carotid Bruit] : no carotid bruit [Normal S1, S2] : normal S1, S2 [No Murmur] : no murmur [No Rub] : no rub [No Gallop] : no gallop [Clear Lung Fields] : clear lung fields [Good Air Entry] : good air entry [No Respiratory Distress] : no respiratory distress  [Soft] : abdomen soft [Non Tender] : non-tender [No Masses/organomegaly] : no masses/organomegaly [Normal Bowel Sounds] : normal bowel sounds [Normal Gait] : normal gait [No Edema] : no edema [No Cyanosis] : no cyanosis [No Clubbing] : no clubbing [No Rash] : no rash [No Varicosities] : no varicosities [No Skin Lesions] : no skin lesions [Moves all extremities] : moves all extremities [No Focal Deficits] : no focal deficits [Normal Speech] : normal speech [Alert and Oriented] : alert and oriented [Normal memory] : normal memory [Normal Appearance] : normal appearance [General Appearance - Well Developed] : well developed [Well Groomed] : well groomed [General Appearance - Well Nourished] : well nourished [General Appearance - In No Acute Distress] : no acute distress [No Deformities] : no deformities [Normal Conjunctiva] : the conjunctiva exhibited no abnormalities [Eyelids - No Xanthelasma] : the eyelids demonstrated no xanthelasmas [Normal Oral Mucosa] : normal oral mucosa [No Oral Pallor] : no oral pallor [Normal Jugular Venous A Waves Present] : normal jugular venous A waves present [No Oral Cyanosis] : no oral cyanosis [Normal Jugular Venous V Waves Present] : normal jugular venous V waves present [No Jugular Venous Wise A Waves] : no jugular venous wise A waves [Respiration, Rhythm And Depth] : normal respiratory rhythm and effort [Exaggerated Use Of Accessory Muscles For Inspiration] : no accessory muscle use [Auscultation Breath Sounds / Voice Sounds] : lungs were clear to auscultation bilaterally [FreeTextEntry1] : right lung crackles [Heart Rate And Rhythm] : heart rate and rhythm were normal [Heart Sounds] : normal S1 and S2 [Murmurs] : no murmurs present [Abdomen Soft] : soft [Abdomen Tenderness] : non-tender [Abdomen Mass (___ Cm)] : no abdominal mass palpated [Abnormal Walk] : normal gait [Gait - Sufficient For Exercise Testing] : the gait was sufficient for exercise testing [Nail Clubbing] : no clubbing of the fingernails [Petechial Hemorrhages (___cm)] : no petechial hemorrhages [Cyanosis, Localized] : no localized cyanosis [Skin Color & Pigmentation] : normal skin color and pigmentation [] : no rash [No Venous Stasis] : no venous stasis [Skin Lesions] : no skin lesions [No Skin Ulcers] : no skin ulcer [No Xanthoma] : no  xanthoma was observed [Oriented To Time, Place, And Person] : oriented to person, place, and time [Affect] : the affect was normal [Mood] : the mood was normal [No Anxiety] : not feeling anxious

## 2023-05-31 PROBLEM — U07.1 COVID-19 WITH PULMONARY COMORBIDITY: Status: ACTIVE | Noted: 2020-07-15

## 2023-06-13 PROBLEM — R06.00 DYSPNEA, UNSPECIFIED TYPE: Status: ACTIVE | Noted: 2023-01-01

## 2023-06-13 NOTE — HISTORY OF PRESENT ILLNESS
[Former] : former [< 20 pack-years] : < 20 pack-years [Never] : never [TextBox_4] : Mr. Wright is a 78 year-old male with a history of COVID-19 with ILD, HTN, HLD, DM2, CAD s/p CABG (LIMA to LAD) s/p PCI (LCx and LAD), PAD, history of GI bleed, and small hiatal hernia who presents for evaluation for pulmonary rehabilitation. He was initially treated with prednisone, now off. He reports dyspnea on exertion. Exercise tolerance reduced to 2 blocks or 1 flight of stairs. He reports dyspnea with speaking. He has a cough productive of white phlegm. He reports frequent throat clearing. He has intermittent cough. He is waiting to receive home oxygen from Dr. Carter to use with ambulation. He reports 20-lb weight loss in the last 6 months. He is a former social smoker, but never smoked regularly. Born in [place], immigrated to  in 2005. 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 Denies any biomass exposure as a child. He has not had an autoimmune workup. Denies being treated with pirfenidone or nintedanib. He was referred by Dr. Sandie Carter.\par \par Labs in May 2023 with peripheral eosinophilia (absolute 920, 7.6%). CMP with hyperkalemia. Normal liver and kidney function.\par \par CT Chest (March 2022) with stable fibrotic interstitial lung disease with basilar predominance. No honeycombing. No endotracheal or proximal endobronchial nodular lesion. No lung mass or suspicious pulmonary nodule. Unchanged long-standing 6 mm solid RML lung nodule since 2015, likely benign given long term stability. No pleural effusion. No thoracic lymphadenopathy. Mild distal esophageal wall thickening, possibly inflammatory related to gastroesophageal reflux given small hiatal hernia, neoplasia cannot be completely excluded.\par \par PFTs (May 2022) with severe restriction, no BD response, low lung volumes, and inability to obtain diffusing capacity.\par \par 6MWD (May 2022) 264 meters. Resting SpO2 95% on room air with desaturation to 84% with ambulation requiring 2 liters O2.\par \par 2D-echo (Dec 2016) with normal LV systolic function, no significant valvular disease, no pericardial effusion, and normal RV size and systolic function with no evidence of pulmonary hypertension (RVSP 26 mm Hg).\par \par EGD (August 2022) with 3 cm hiatal hernia. Mild distal esophagitis s/p biopsies. No mass. Atrophic gastritis.\par \par Up to date with influenza, pneumococcal, and COVID-19 vaccinations. [TextBox_13] : 3 [TextBox_11] : <1/10

## 2023-06-13 NOTE — PHYSICAL EXAM
[No Acute Distress] : no acute distress [Well Groomed] : well groomed [Normal Oropharynx] : normal oropharynx [Normal Appearance] : normal appearance [Supple] : supple [No Neck Mass] : no neck mass [No JVD] : no jvd [Normal Rate/Rhythm] : normal rate/rhythm [Normal Pulses] : normal pulses [Normal S1, S2] : normal s1, s2 [No Murmurs] : no murmurs [No Resp Distress] : no resp distress [No Acc Muscle Use] : no acc muscle use [No Abnormalities] : no abnormalities [Benign] : benign [Not Tender] : not tender [Soft] : soft [Normal Gait] : normal gait [No Clubbing] : no clubbing [No Cyanosis] : no cyanosis [No Edema] : no edema [FROM] : FROM [Normal Color/ Pigmentation] : normal color/ pigmentation [No Focal Deficits] : no focal deficits [Cranial Nerves Intact] : cranial nerves intact [No Motor Deficits] : no motor deficits [Oriented x3] : oriented x3 [Normal Affect] : normal affect [TextBox_2] : cachectic [TextBox_68] : inspiratory rales at the bases bilaterally with faint squeaking  [TextBox_105] : mild clubbing and cyanosis of fingers bilaterally

## 2023-06-13 NOTE — ASSESSMENT
[FreeTextEntry1] : Mr. Wright is a 78 year-old male with a history of COVID-19 with ILD, HTN, HLD, DM2, CAD s/p CABG (LIMA to LAD) s/p PCI (LCx and LAD), PAD, history of GI bleed, and small hiatal hernia who presents for evaluation for pulmonary rehabilitation. He was initially treated with prednisone in 2020, now tapered off. He reports dyspnea on exertion. Exercise tolerance reduced to 2 blocks or 1 flight of stairs. He reports dyspnea with speaking. He has a cough productive of white phlegm. He reports frequent throat clearing. He has intermittent cough. He is waiting to receive home oxygen from Dr. Carter to use with ambulation. He reports 20-lb weight loss in the last 6 months as wife has been in [place] and he reports dyspnea with eating. He is a former social smoker, but never smoked regularly. Born in [place], immigrated to  in 2005. No history of biomass exposure. 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. He has not had an autoimmune workup. Denies being treated with pirfenidone or nintedanib. He was referred by Dr. Sandie Carter.\par \par PFTs (May 2023) with severe restriction and low lung volumes, unable to perform DLCO maneuver.\par \par Labs in May 2023 with peripheral eosinophilia (absolute 920, 7.6%). CMP with hyperkalemia. Normal liver and kidney function.\par \par CT Chest (March 2022) with stable fibrotic interstitial lung disease with basilar predominance. No honeycombing. No endotracheal or proximal endobronchial nodular lesion. No lung mass or suspicious pulmonary nodule. Unchanged long-standing 6 mm solid RML lung nodule since 2015, likely benign given long term stability. No pleural effusion. No thoracic lymphadenopathy. Mild distal esophageal wall thickening, possibly inflammatory related to gastroesophageal reflux given small hiatal hernia, neoplasia cannot be completely excluded.\par \par PFTs (May 2022) with severe restriction, no BD response, low lung volumes, and inability to obtain diffusing capacity.\par \par 6MWD (May 2022) 264 meters. Resting SpO2 95% on room air with desaturation to 84% with ambulation requiring 2 liters O2.\par \par 2D-echo (Dec 2016) with normal LV systolic function, no significant valvular disease, no pericardial effusion, and normal RV size and systolic function with no evidence of pulmonary hypertension (RVSP 26 mm Hg). Reportedly with repeat echo in June 2022 that is normal.\par \par EGD (August 2022) with 3 cm hiatal hernia. Mild distal esophagitis s/p biopsies. No mass. Atrophic gastritis.\par \par Assessment:\par ILD likely due to history of COVID-19 with pulmonary comorbidity\par Severe restrictive lung disease\par Exercise hypoxemia\par Unintentional weight loss\par Chronic cough\par CAD\par \par Plan:\par - Ok to initiate pulmonary rehab for his ILD with severe restriction and exercise hypoxemia\par - Follows closely with cardiology, no contraindications to proceed with pulmonary rehab\par - It is interesting to note the peripheral eosinophilia present on CBC, which has been present since his initial diagnosis of ILD in the summer of 2020. I am not sure if there is an association, but he does report previous improvement in symptoms with prednisone, which has been tapered off. 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 Doubt chronic helminthic infection, but it is reasonable to send workup, including strongyloides, toxocara, filaria, and schistosomiasis\par - Start prednisone 50 mg daily for 5 days, then 40 mg daily for 5 days, then 30 mg daily for 5 days, then 20 mg daily for 5 days, then 10 mg daily for 5 days, then re-evaluate with primary pulmonologist\par - Start fluticasone nasal spray 1 spray per nostril twice daily \par - Start pantoprazole 40 mg daily\par - Prescription provided for portable oxygen concentrator as well as stationary concentrator given history of exercise hypoxemia, awaiting home oxygen therapy\par - Will discuss with primary pulmonologist regarding autoimmune workup (e.g., RF, CCP, VICTORIA, dsDNA, SS-A, SS-B, ARVIND, centromere, scleroderma, CK, aldolase, RNP, ribosomal P protein, Lola-1, myomarker panel, ANCA, GBM, IgG4, HIV, acute hepatitis panel, quantiferon, and immunoglobulins)\par - Consider starting antifibrotic therapy with nintedanib or esbriet\par - He is up to date with influenza, pneumococcal, and COVID-19 vaccinations\par - Follow-up after he initiates pulmonary rehab

## 2023-06-13 NOTE — CONSULT LETTER
[Dear  ___] : Dear  [unfilled], [Consult Letter:] : I had the pleasure of evaluating your patient, [unfilled]. [Please see my note below.] : Please see my note below. [Consult Closing:] : Thank you very much for allowing me to participate in the care of this patient.  If you have any questions, please do not hesitate to contact me. [Sincerely,] : Sincerely, [FreeTextEntry2] : Dr. Sandie Carter MD\par Dr. Franco Johnson MD [FreeTextEntry3] : Aron Hendrix MD\par Attending Physician in Pulmonary & Critical Care Medicine\par  of Medicine\par [employer] [facility]

## 2023-06-13 NOTE — REVIEW OF SYSTEMS
[Postnasal Drip] : postnasal drip [Cough] : cough [Sputum] : sputum [Dyspnea] : dyspnea [SOB on Exertion] : sob on exertion [GERD] : gerd [Negative] : Endocrine [TextBox_3] : unable to eat due to dyspnea

## 2023-06-21 NOTE — ASSESSMENT
[FreeTextEntry1] : Data reviewed:\par \par CT 3/2022 [facility] personally reviewed : stable fibrotic disease\par PA/lat CXR in office 8/4/22: diffuse fibrotic disease, inc density on L is old\par \par PFT 5/24/22: FVC 1.35L (42%), FEV1 1.28L (57%), TLC 2.18L (37%), DLCO x / FENO 44 / 264m desat to 84%\par PFT 5/24/22: FVC 1.12L (36%), FEV1 1.04L (47%), TLC 2.40L (41%), DLCO x / 312m desat to 83%, titrated to 3L\par \par Impression:\par Post-Covid pulmonary fibrosis\par Ambulatory desaturation, declined supplemental O2 in 5/2022, agreeable now\par \par Plan:\par Follow up on oxygen order.\par I am not sure about the utility of serologic workup for causes of ILD in this man who did not have ILD before having Covid and who now has fibrotic lung disease. There are also no data for (or against) use of antifibrotics in this setting, which I discussed with the patient.\par His ability to understand and comply with treatment is limited as we see with this steroid course.\par I will get a new CT chest and see him back in one month to review that scan and assess any effect of the steroid course that he was given by Dr Hendrix.

## 2023-06-21 NOTE — HISTORY OF PRESENT ILLNESS
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 Quit smoking in his 40s, born in [place] [facility], lives in [place], daughter is in medical school in [place].\par \par 2/9/23: He will start pulm rehab at [facility] [place] in March. 1199 told him he has run out of sick leave but can take a leave of absence. He's been out of work x 6 mos and 1199 told him he can stay out another 3 mos on FMLA. He's lost 10 lbs since the fall. His food has no taste. No dysphagia. No abd pain or change in bowel habits. Has follow up w Dr Bowie. Wants to return to work after rehab. Daughter 2nd year in medical school - this is also what he told me last year.\par \par 4/10/2023: He remains on FMLA leave from work and he comes in today because he got a letter that they do not have medical documentation and he is now on unpaid leave. He did not see Dr Bowie or go to rehab because he felt too dyspneic. Daughter is now in 3rd year and wife has gone to [place] so he is all alone.\par \par 6/21/2023: Since seeing me last he was evaluated for rehab and at that consultation he was given a 25 day steroid taper from 50mg. He didn't understand this initially and he just took one day, but he started it again as the taper 2 days ago. He cannot actually start the rehab until October. He does not yet have supplemental oxygen. He does have an order in at [facility].

## 2023-06-26 PROBLEM — R45.89 DEPRESSED MOOD: Status: ACTIVE | Noted: 2023-01-01

## 2023-06-26 NOTE — PLAN
[FreeTextEntry1] : #ILD\par Hx of ILD. \par - follows with Dr. Carter and Dr. Hendrix\par - c/w prednisone taper--should end 7/13\par - in process of obtaining home O2\par \par #Hyperkalemia\par States he has not been taking Valtassa bc too expensive.\par - email Dr. Murillo to discuss new medication to take for hyperkalemia vs. dc'ing irbesartan\par - recheck BMP today\par - email SW to discuss help with insurance\par \par #Unintentional weight loss\par Wt 98lbs today, down from 99 ~1.5 weeks ago.  Suspect 2/2 ILD vs. depression.\par - c/w steroids for ILD\par - psychology referral\par \par #Depressed mood\par Endorses depressed mood loss of interest\par - psychology referral\par \par F/u appointment scheduled on 8/10\par

## 2023-06-26 NOTE — END OF VISIT
[] : Resident [FreeTextEntry3] : \par \par 79 yo m w/ h/o ILD here for depression\par \par #ILD- trouble with compliance. literacy issue. sob. getting new ct chest with pulm\par \par #depressed- should talk to therapist. no red flags\par \par #leukocytosis- need to recheck later for weight loss- recently on steroids\par #high potasisum- recheck lab today. spoke to daughter on phone to get invovled in care and they are struggling with figuring out his insurance and coverage for things. will see if SW can chat with them. has not been taking valtessa due to cost issue. ask renal what to use instead of valtesa and about stopping or cutting down irbesartan\par \par  [Time Spent: ___ minutes] : I have spent [unfilled] minutes of time on the encounter.

## 2023-06-26 NOTE — HISTORY OF PRESENT ILLNESS
[FreeTextEntry1] : f/u- high potassium [de-identified] : 78 year-old male with a history of COVID-19 with ILD, HTN, HLD, DM2, CAD s/p CABG (LIMA to LAD) s/p PCI (LCx and LAD), PAD, history of GI bleed, and small hiatal hernia who presents for f/u.  He saw Dr. Carter who is in the process of helping him obtain home and portable O2.  He has been taking his steroids as prescribed.  His sob is unchanged.  He feels depressed and has loss of interest.  His appetite is still poor.  He is open to speaking with a therapist but does not want to travel to see him.  He has not been taking Veltassa bc says insurance changed and now the medication is too expensive.\par \par ROS negative except as above.

## 2023-06-26 NOTE — ASSESSMENT
[FreeTextEntry1] : 78 year-old male with a history of COVID-19 with ILD, HTN, HLD, DM2, CAD s/p CABG (LIMA to LAD) s/p PCI (LCx and LAD), PAD, history of GI bleed, and small hiatal hernia who presents for f/u.

## 2023-06-26 NOTE — PHYSICAL EXAM
[No Acute Distress] : no acute distress [No Lymphadenopathy] : no lymphadenopathy [No Murmur] : no murmur heard [No Edema] : there was no peripheral edema [Soft] : abdomen soft [Non Tender] : non-tender [Non-distended] : non-distended [Normal] : normal gait, coordination grossly intact, no focal deficits and deep tendon reflexes were 2+ and symmetric [de-identified] : elderly, frail [de-identified] : mild iwob but no accessory m. use, lungs CTAB [de-identified] : sinus tach

## 2023-06-27 PROBLEM — E11.29 MICROALBUMINURIA DUE TO TYPE 2 DIABETES MELLITUS: Status: ACTIVE | Noted: 2023-01-01

## 2023-07-06 NOTE — ASSESSMENT
[FreeTextEntry1] : 78-year-old man with chronic hyperkalemia -his last K was 5.5 after Veltassa for 3 weeks, 3 times per week -I am renewing it and asking him to go to the lab today for electrolytes and plasma K.  I do not think his mild hyponatremia explains memory loss unless his sodium has fallen further.  I am ordering Veltassa through [facility] pharmacy on [address], which seems to make more of an effort to make Veltassa affordable

## 2023-07-06 NOTE — HISTORY OF PRESENT ILLNESS
Patient attended Phase 2 Cardiac Rehab Exercise Session. Further documentation will be completed in [facility] and will be scanned into the medical record upon discharge.    
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  There are at least 3 contributing factors including type 2 diabetes for many years, use of ARB, irbesartan 300 mg daily, as well as beta-blocker, carvedilol.  On May 9, his sodium was 130, K6.0, CO2 23, creatinine 0.74 with GFR of 93, A1c 8.  On June 21 he saw Dr. Carter for pulmonary consult with an O2 sat of 97.  June 26 he saw Dr. Alanis with a BP of 135/80.  His K fell to 5.9 and then 5.5 on Veltassa 8.4 g 3 times a week for 3 weeks.  He has run out and insurance apparently is not covering it properly.  He feels that his memory is not as good as usual -his serum sodium has been running 1 30-1 31, not really low enough to explain memory issues.

## 2023-07-19 NOTE — ASSESSMENT
[FreeTextEntry1] : Data reviewed:\par \par CT 3/2022 [facility] personally reviewed : stable fibrotic disease\par PA/lat CXR in office 8/4/22: diffuse fibrotic disease, inc density on L is old\par \par PFT 5/24/22: FVC 1.35L (42%), FEV1 1.28L (57%), TLC 2.18L (37%), DLCO x / FENO 44 / 264m desat to 84%\par PFT 5/24/22: FVC 1.12L (36%), FEV1 1.04L (47%), TLC 2.40L (41%), DLCO x / 312m desat to 83%, titrated to 3L\par \par Impression:\par Post-Covid pulmonary fibrosis\par Ambulatory desaturation, declined supplemental O2 in 5/2022, on supplemental O2 now\par \par Plan:\par He did not derive any subjective benefit from steroids.\par He has not had his scan but I did ask him to go ahead and book it now.\par Try using oxygen w eating. Internists also aware of his weight loss.\par On wait list for rehab.\par Return 4 mos.\par --\par CT chest 7/18/2023 personally reviewed and d/w radiology last night:\par 1. Mild distal esophageal wall thickening, possibly inflammatory related to gastroesophageal reflux given small hiatal hernia, neoplasia cannot be completely excluded. Consider endoscopic correlation.\par 2. No suspicious adenopathy or evidence of metastatic disease.\par 3. Unchanged fibrotic interstitial lung disease with basilar predominance.\par \par He is clinically stable from office visit yesterday. Will bring him back for consideration of antifibrotics.\par

## 2023-07-19 NOTE — HISTORY OF PRESENT ILLNESS
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 Quit smoking in his 40s, born in [facility], lives in [place], daughter is in medical school in [place].\par \par 2/9/23: He will start pulm rehab at [facility] [place] in March. 1199 told him he has run out of sick leave but can take a leave of absence. He's been out of work x 6 mos and 1199 told him he can stay out another 3 mos on FMLA. He's lost 10 lbs since the fall. His food has no taste. No dysphagia. No abd pain or change in bowel habits. Has follow up w Dr Bowie. Wants to return to work after rehab. Daughter 2nd year in medical school - this is also what he told me last year.\par \par 4/10/2023: He remains on FMLA leave from work and he comes in today because he got a letter that they do not have medical documentation and he is now on unpaid leave. He did not see Dr Bowie or go to rehab because he felt too dyspneic. Daughter is now in 3rd year and wife has gone to [facility] so he is all alone.\par \par 6/21/2023: Since seeing me last he was evaluated for rehab and at that consultation he was given a 25 day steroid taper from 50mg. He didn't understand this initially and he just took one day, but he started it again as the taper 2 days ago. He cannot actually start the rehab until October. He does not yet have supplemental oxygen. He does have an order in at [facility].\par \par 7/18/2023: Returns for follow up w his wife today. He took the 25 day steroid taper from Dr Hendrix and did not feel any better. He is fatigued. He is losing weight. He has difficulty swallowing and fatigue and dyspnea on eating. He is on the wait list for pulm rehab. He got home O2 and portable O2 and has the portable with him today. It's heavy for him.

## 2023-08-09 NOTE — HISTORY OF PRESENT ILLNESS
[FreeTextEntry1] : 78 M LIMA to LAD, stent to LCX and LAD HTN DM PAD calcified arteries on SYL/PVR GI bleed COVID Lung on home oxygen Hyperkalemia   here for fu he has had a marked decline lost an enormous amount of weight has no appetite appears sob at rest difficulty completing his sentences.    ecg st RBBB 8/9/2023 echo 6/2022 EF normal PASP 60-65% stress test 1/2022 8.3 METS small reversible apical defect

## 2023-08-09 NOTE — ASSESSMENT
[FreeTextEntry1] : HTN decrease irbesartan to 150 mg daily change coreg to toprol 25 mg daily  his soboe due to covid lung he has severely diminished exercise capacity CAD on rosuvastatin 10 mg daily intolerant of higher doses  DM he has lost a lot of weight hga1c not at goal stay on current regimen started on januvia he will see geriatrics for possible depression and weight loss i reached to IM  fu in three months

## 2023-08-09 NOTE — PROCEDURE
[Tc-99m, sestamibi-Cardiolite, to 40mCi, dose-Radionuclides-GumGum code--v.1-Active-Trade] : Tc-99m, sestamibi-Cardiolite, to 40mCi, dose-Radionuclides-GumGum code--v.1-Active-Trade [Normal] : 3. No pericardial effusion. [ECG Atrial Arrhythmias] : no arrhythmias [de-identified] : Given via the IV route.    1 Day Protocol. Rest/Stress. SPECT. Gated. [de-identified] : 10.4 mCi [de-identified] : Given via the IV route.  Injection time and Heart Rate [de-identified] : 28.7 [de-identified] : 118 [de-identified] : 160/56 [de-identified] : occsaional PVCs, 1 couplet [de-identified] : 4.7 METS; 81 [de-identified] : 69 [de-identified] : Image Quality: Excellent\par  Artifacts: None\par  Comparison to prior study: N/A  [TWNoteComboBox1] : DAVID [TWNoteComboBox4] : dyspnea [TWNoteComboBox2] : Fabricio [TWNoteComboBox3] : 1 [TWNoteComboBox5] : dyspnea [de-identified] : Dr. Franco Johnson (card) [de-identified] : Nicole Petri-Schoener, ENOCCS [de-identified] : shortness of breath [de-identified] : 1.1 [de-identified] : 0.9 [de-identified] : 3.7 [de-identified] : 1.8 [de-identified] : 3.0 [de-identified] : 3.3 [de-identified] : 33 mmHg [de-identified] : 60-83 [de-identified] : Mild concentric left ventricular hypertrophy. Age appropriate diastolic function [de-identified] : The left atrium is normal in size. The left atrial volume index is 23 ml/m2. [FreeTextEntry1] : There is mild  posterior mitral annular calcification. There is minimal mitral regurgitation. There is no mitral stenosis. [de-identified] : There is trace pulmonic insufficiency. [de-identified] : There is minimal tricuspid regurgitation. [de-identified] : The inferior vena cava measures 1.2 cm. [de-identified] : Mitral annular calcification

## 2023-08-10 PROBLEM — R05.3 CHRONIC COUGH: Status: ACTIVE | Noted: 2019-05-29

## 2023-08-10 PROBLEM — Z00.00 HEALTH CARE MAINTENANCE: Status: ACTIVE | Noted: 2023-01-01

## 2023-08-10 NOTE — REVIEW OF SYSTEMS
[Fatigue] : fatigue [Recent Change In Weight] : ~T recent weight change [Shortness Of Breath] : shortness of breath [Cough] : cough [Dyspnea on Exertion] : dyspnea on exertion [Negative] : Heme/Lymph

## 2023-08-15 NOTE — PHYSICAL EXAM
[Ill-Appearing] : ill-appearing [Cachectic] : cachexia was observed [Normal] : affect was normal and insight and judgment were intact [de-identified] : dry crackles

## 2023-08-15 NOTE — HEALTH RISK ASSESSMENT
[0] : 2) Feeling down, depressed, or hopeless: Not at all (0) [PHQ-2 Negative - No further assessment needed] : PHQ-2 Negative - No further assessment needed [HCU2Hksls] : 0

## 2023-08-16 PROBLEM — I63.9 CEREBROVASCULAR ACCIDENT (CVA), UNSPECIFIED MECHANISM: Status: ACTIVE | Noted: 2023-01-01

## 2023-08-17 NOTE — ASSESSMENT
[FreeTextEntry1] : Data reviewed:  CT chest 7/18/2023 personally reviewed and d/w radiology previously: 1. Mild distal esophageal wall thickening, possibly inflammatory related to gastroesophageal reflux given small hiatal hernia, neoplasia cannot be completely excluded. Consider endoscopic correlation. 2. No suspicious adenopathy or evidence of metastatic disease. 3. Unchanged fibrotic interstitial lung disease with basilar predominance.  PFT 5/24/22: FVC 1.35L (42%), FEV1 1.28L (57%), TLC 2.18L (37%), DLCO x / FENO 44 / 264m desat to 84% PFT 5/24/22: FVC 1.12L (36%), FEV1 1.04L (47%), TLC 2.40L (41%), DLCO x / 312m desat to 83%, titrated to 3L  Impression: Post-Covid pulmonary fibrosis, progressive  Ambulatory desaturation, declined supplemental O2 in 5/2022, on supplemental O2 now  Plan: Given that CT showed progression of fibrosis, possible UIP development, plan to start antifibrotic. Patient has an indication for Ofev, but history of CAD, so will do pirfenidone instead. LFTs reviewed, within normal limits. - repeat LFTs every month for first three months, then every 6 months - start pirfenidone 267mg three times daily for week 1, then 534mg three times daily for week 2, then 801mg three times dalily for week 3 and maintain - revisit in 1 month with LFTs -- Attending Addendum  Seen and examined by me and agree w above. We need to start an antifibrotic. Given his CAD we will preferentially use pirfenidone. LFTs recently wnl. Start pirfenidone and return in 6 weeks (to allow time to obtain drug) and recheck LFTs at that time. Cont supplemental O2. Will need Covid, flu, RSV vaccines. Discussed w daughter. LFTs monthly x 6 mos.

## 2023-08-17 NOTE — PHYSICAL EXAM
[No Acute Distress] : no acute distress [No JVD] : no jvd [Normal Rate/Rhythm] : normal rate/rhythm [Normal S1, S2] : normal s1, s2 [No Resp Distress] : no resp distress [TextBox_2] : Sitting comfortably [TextBox_68] : Fine crackles worst in left upper lung zones, adequate air entry throughout all zones, no wheeze [TextBox_105] : Mild clubbing present

## 2023-08-17 NOTE — HISTORY OF PRESENT ILLNESS
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 Quit smoking in his 40s, born in [facility], lives in [place], daughter is in medical school in [place].  2/9/23: He will start pulm rehab at [facility] [place] in March. 1[employer] told him he has run out of sick leave but can take a leave of absence. He's been out of work x 6 mos and 1199 told him he can stay out another 3 mos on FMLA. He's lost 10 lbs since the fall. His food has no taste. No dysphagia. No abd pain or change in bowel habits. Has follow up w Dr Bowie. Wants to return to work after rehab. Daughter 2nd year in medical school - this is also what he told me last year.  4/10/2023: He remains on FMLA leave from work and he comes in today because he got a letter that they do not have medical documentation and he is now on unpaid leave. He did not see Dr Bowie or go to rehab because he felt too dyspneic. Daughter is now in 3rd year and wife has gone to [facility] so he is all alone.  6/21/2023: Since seeing me last he was evaluated for rehab and at that consultation he was given a 25 day steroid taper from 50mg. He didn't understand this initially and he just took one day, but he started it again as the taper 2 days ago. He cannot actually start the rehab until October. He does not yet have supplemental oxygen. He does have an order in at [facility]  7/18/2023: Returns for follow up w his wife today. He took the 25 day steroid taper from Dr Hendrix and did not feel any better. He is fatigued. He is losing weight. He has difficulty swallowing and fatigue and dyspnea on eating. He is on the wait list for pulm rehab. He got home O2 and portable O2 and has the portable with him today. It's heavy for him.  8/17/2023 [Yareli]: Seen today w wife and d/w daughter on speaker phone. Patient continues to lose weight, lost about 22 lbs over 6 months. Using home oxygen 2-3L/min with physical activity mostly. Occasional dry cough. No changes in symptom severity, no wrosening of shortness of breath or exercise limitation.

## 2023-09-29 PROBLEM — Z12.5 SCREENING FOR PROSTATE CANCER: Status: ACTIVE | Noted: 2023-01-01

## 2023-09-29 PROBLEM — Z11.4 SCREENING FOR HIV (HUMAN IMMUNODEFICIENCY VIRUS): Status: ACTIVE | Noted: 2023-01-01

## 2023-09-29 PROBLEM — Z11.3 ROUTINE SCREENING FOR STI (SEXUALLY TRANSMITTED INFECTION): Status: ACTIVE | Noted: 2023-01-01

## 2023-09-29 PROBLEM — K59.00 CONSTIPATION: Status: ACTIVE | Noted: 2023-01-01

## 2023-10-07 PROBLEM — R13.10 DYSPHAGIA: Status: ACTIVE | Noted: 2023-01-01

## 2023-10-09 PROBLEM — E87.1 HYPONATREMIA: Status: ACTIVE | Noted: 2022-08-09

## 2023-10-09 PROBLEM — E87.5 HYPERKALEMIA: Status: ACTIVE | Noted: 2022-02-10

## 2023-10-31 PROBLEM — R06.02 SOBOE (SHORTNESS OF BREATH ON EXERTION): Status: ACTIVE | Noted: 2020-07-02

## 2023-11-01 PROBLEM — M79.89 LEG SWELLING: Status: ACTIVE | Noted: 2023-01-01

## 2023-11-01 PROBLEM — D72.829 LEUKOCYTOSIS: Status: ACTIVE | Noted: 2023-01-01

## 2023-11-14 PROBLEM — R49.8 PRESBYPHONIA: Status: ACTIVE | Noted: 2023-01-01

## 2023-11-14 PROBLEM — R13.10 DYSPHAGIA: Status: ACTIVE | Noted: 2023-01-01

## 2023-11-14 PROBLEM — H61.22 CERUMINOSIS, LEFT: Status: ACTIVE | Noted: 2023-01-01

## 2023-11-20 PROBLEM — J84.9 INTERSTITIAL LUNG DISEASE: Status: ACTIVE | Noted: 2023-01-01

## 2023-11-20 NOTE — H&P ADULT - PROBLEM SELECTOR PLAN 1
Likely aspiration PNA vs HAP i/s/o recent PNA treatment and underlying Pulmonary Disease   Patient presented to Dr. Carter's office with complaints of dyspnea at rest, a new cough, and increased agitation as per wife who is at bedside. Patient on home O2 3LNC only while ambulating, however, was now needing more oxygen even at rest.   Wife reporting that patient presentation is similar to prior hx of PNA; recently hospitalized at [facility] 9/11-9/15 and treated for bacterial PNA, discharged on cefpodoxime. PNA could be 2/2 to aspiration, as per wife barium swallow done a few weeks prior to presentation was concerning for aspiration vs. recent travel hx to [place] to visit daughter at [facility] [place].  On admission, met 2/4 SIRS criteria with 's, WBC 17 as well as tachypneic and with increasing O2 requirements. CTPE was done to rule out PE; no evidence of PE, however CT showed evidence of RML and RLL consolidation on a background of pulmonary fibrosis.   RVP negative  Plan:  - Will start Vancomycin 750mg qd and zosyn 4.5g q8hrs (pseudomonal dosing) given recent antibiotic use and pulmonary fibrosis   - vanc trough prior to 4th dose  - f/u BCx's   - f/u sputum Cx, if able to produce  - f/u urine strep and legionella  - f/u MRSA swab  - c/w O2 via NC, wean as tolerated

## 2023-11-20 NOTE — ED PROVIDER NOTE - OBJECTIVE STATEMENT
The patient is a 77 yo male with a past medical history of HTN, HLD, DM, CABG requiring 3 stents, and interstitial lung disease s/p COVID-19 with pulm fibrosis on home 02, DNR/DNI presenting with acute onset sob since this AM. No fever, chills, cough, chest pain, nausea, vomiting, lightheadedness, dizziness. Sent for ro PE. No leg pain or leg swelling, no history of dvt or pe. ROS as above.

## 2023-11-20 NOTE — H&P ADULT - HISTORY OF PRESENT ILLNESS
Patient is a 79 y/o M with pmhx of HTN, HLD, DM, CAD s/p CABG x2 and PCI LCx and LAD, PAD, hx of GIB, and 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 Patient has been following up with Dr. Carter since his diagnosis of pulmonary fibrosis. He was initially on ofev, but was recently started on pirfenidone 8/2023 for his pulmonary fibrosis. Patient was recently treated for a bacterial PNA at [facility] 9/11-9/15 and was discharged on cefpodoxime to complete a course of antibiotics. Patient was seen by Dr. Carter outpatient with complaints of dyspnea at rest, increasing O2 requirements (requiring O2 while at rest, not just when ambulating), increased agitation. Wife at bedside reported that patient's presentation was similar to his previous episode of PNA in September. Given patient's presentation, he was sent to the [facility] ED.     ED Course:   Vitals:   Labs:  Imaging:  Interventions:  Consults: Pulmonology, ICU -> recommends Telemetry     In the [facility] ED, ICU was consulted given concerns for AHRF 2/2 PNA vs. PE vs. worsening pulmonary fibrosis. CT PE was obtained to r/o PE; it showed RML and RLL consolidation concerning for PNA, on a background of pulmonary fibrosis. Patient was started on broad spectrum antibiotics vanc and zosyn, BCx's and an RVP were obtained. Upon examination, patient was lying down in bed comfortably, speaking in full sentences, satting 100% on 3LNC. Patient expressed wishes for DNR/DNI with a trial of NIV.  Patient is a 77 y/o M with pmhx of HTN, HLD, DM, CAD s/p CABG x2 and PCI LCx and LAD, PAD, hx of GIB, and 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 Patient has been following up with Dr. Carter since his diagnosis of pulmonary fibrosis. He was initially on ofev, but was recently started on pirfenidone 8/2023 for his pulmonary fibrosis. Patient was recently treated for a bacterial PNA at [facility] 9/11-9/15 and was discharged on cefpodoxime to complete a course of antibiotics. Patient was seen by Dr. Carter outpatient with complaints of dyspnea at rest, increasing O2 requirements (requiring O2 while at rest, not just when ambulating), increased agitation. Wife at bedside reported that patient's presentation was similar to his previous episode of PNA in September. Given patient's presentation, he was sent to the [facility] ED.   On arrival to the unit, the patient was accompanied by his wife. The patient states that his breathing is better compared to this AM. He has been coughing less and denies bringing up any phlegm at this time. No chest pain or abdominal pain. The patient is requesting coffee as he usually drinks some to go to sleep at night. Looks comfortable and not in distress at this time.     ED Course:   Vitals: Afebrile, Tachycardic (120), /89, Spo2 96% on 6L NC  Labs: WBC 17.4, Coags WNL, Na 133, Cl 90, Glucose 217, Ca 10.7, Pro-, A1c 7.7, ABG with respiratory acidosis,   Imaging: CT Chest: New consolidation is noted in the right middle lobe and in the right lower lobe on a background of thickened interstitial lung markings and pulmonary fibrosis and honeycombing, likely represent acute pneumonia superimposed on pulmonary fibrosis. Cannot exclude a degree of congestive heart failure.  Interventions: Giacomo and Zosyn in ED  Consults: Pulmonology, ICU -> recommends Telemetry

## 2023-11-20 NOTE — ED ADULT NURSE NOTE - OBJECTIVE STATEMENT
Pt with post-covid fibrosis, order CTPE, consult pulm fellow.     Pt BIBA as STEMI ALERT c/o SOB/weakness.    Pt is a 79y/o M BIBA w/ c/o of SOB, weakness, nasal congestion and a STEMI alert UPGRADED to Dr. Duran. Pt has PMHx of post-COVID pulmonary fibrosis since 2020, wears 3L O2 @ baseline, presented to clinic today for SOB where pt was noted to be hypoxic and tachycardic. Pt is a 79y/o M BIBA w/ c/o of SOB, weakness, nasal congestion x2d and a STEMI alert UPGRADED to Dr. Duran. Pt has PMHx of post-COVID pulmonary fibrosis since 2020, wears 3L O2 @ baseline, presented to clinic today for SOB where pt was noted to be hypoxic and tachycardic, sent in for R/O CTPE and "consult pulmonary fellow." Pt denies CP, abd pain, headache, blurry vision, fever, chills, N/V/D. Pt received aspirin w/ EMS. EKG obtained and STEMI alert cancelled. Pt placed in gown, on continuous cardiac monitor and pulse ox. Suction set-up @ bedside. Sats > 95% on 3L NC. Pt resting comfortably in stretcher w/ occasional nonproductive cough. Pt ambulates w/ walker @ baseline. Pt tachypneic, regular, even respirations. 18g IV placed in R AC. Labs drawn. Pt family @ bedside. Pt is a 79y/o M BIBA w/ c/o of SOB, weakness, nasal congestion x2d and a STEMI alert UPGRADED to Dr. Duran. Pt has PMHx of post-COVID pulmonary fibrosis since 2020, wears 3L O2 @ baseline, presented to clinic today for SOB where pt was noted to be hypoxic and tachycardic, sent in for R/O PE and "consult pulmonary fellow." Pt denies CP, abd pain, headache, blurry vision, fever, chills, N/V/D. Pt received aspirin w/ EMS. EKG obtained and STEMI alert cancelled. Pt placed in gown, on continuous cardiac monitor and pulse ox. Suction set-up @ bedside. Sats > 95% on 3L NC. Pt resting comfortably in stretcher w/ occasional nonproductive cough. Pt ambulates w/ walker @ baseline. Pt tachypneic, regular, even respirations. 18g IV placed in R AC. Labs drawn. Pt family @ bedside.

## 2023-11-20 NOTE — H&P ADULT - PROBLEM SELECTOR PLAN 5
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 Carter outpatient, currently on pirfenidone 267mg 3 tablets TID.  - c/w pirfenidone 267mg 3 tablets TID  - c/w protonix 40mg qd GI ppx while on pirfenidone   - pulm following, appreciate recs

## 2023-11-20 NOTE — ED PROVIDER NOTE - CLINICAL SUMMARY MEDICAL DECISION MAKING FREE TEXT BOX
79 yo with sob, sent for ro PE, sinus tach, old RBBB. moderate risk wells. Will obtain CTA chest, labs including trop, reasesss.

## 2023-11-20 NOTE — H&P ADULT - ASSESSMENT
Patient is a 79 y/o M with pmhx of HTN, HLD, DM, CAD s/p CABG x2 and PCI LCx and LAD, PAD, hx of GIB, and 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 Carter outpatient with concerns of AHRF 2/2 PNA vs. PE vs. worsening pulmonary fibrosis, found to have RML and RLL consolidation concerning for PNA, on a background of pulmonary fibrosis, admitted for IV antibiotics with a trial of NIV if needed.

## 2023-11-20 NOTE — ED PROVIDER NOTE - NS ED ROS FT
Constitutional: No fever or chills  Eyes: No discharge or drainage  Ears, Nose, Mouth, Throat: No nasal discharge, no sore throat  Cardiovascular: No chest pain, no palpitations  Respiratory: +shortness of breath, no cough  Gastrointestinal: No nausea or vomiting, no abdominal pain, no diarrhea or constipation  Musculoskeletal: No joint pain, no swelling  Skin: No rashes or lesions  Neurological: No numbness, weakness, tingling, no headache

## 2023-11-20 NOTE — H&P ADULT - PROBLEM SELECTOR PLAN 8
Patient on rosuvastatin 10mg qhs.  - c/w atorvastatin 40mg qhs TI for rosuvastatin 10mg qhs Patient no longer on any meds for HTN. BP on admission 129/83-->150/75. Patient was on amlodipine, however states that he has not taken it due to development of edema in LE.   Plan:  - monitor BP

## 2023-11-20 NOTE — H&P ADULT - NSHPREVIEWOFSYSTEMS_GEN_ALL_CORE

## 2023-11-20 NOTE — CHART NOTE - NSCHARTNOTEFT_GEN_A_CORE
Please see outpatient clinic note from today.     Patient to be DNR/DNI. MOLST to be filled out and placed in chart.

## 2023-11-20 NOTE — CONSULT NOTE ADULT - SUBJECTIVE AND OBJECTIVE BOX
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 Seen today in the office where he reported that he's had increased dyspnea compared to his baseline and does not feel as if he is getting enough oxygen. Has been more agitated recently.    REVIEW OF SYSTEMS:  Constitutional: No fever, weight loss or fatigue  Eyes: No eye pain, visual disturbances, or discharge  ENMT:  No difficulty hearing, tinnitus, vertigo; No sinus or throat pain  Neck: No pain, stiffness or neck swelling  Respiratory: see HPI  Cardiovascular: No chest pain, palpitations, dizziness or leg swelling  Gastrointestinal: No abdominal or epigastric pain. No nausea, vomiting or hematemesis; No diarrhea or constipation. No melena or hematochezia.  Genitourinary: No dysuria, frequency, hematuria or incontinence  Neurological: No headaches, memory loss, loss of strength, numbness or tremors  Skin: No itching, burning, rashes or lesions   Lymph Nodes: No enlarged glands  Endocrine: No heat or cold intolerance; No hair loss  Musculoskeletal: No joint pain or swelling; No muscle, back or extremity pain  Psychiatric: No depression, anxiety, mood swings or difficulty sleeping  Heme/Lymph: No easy bruising or bleeding gums  Allergy and Immunologic: No hives or eczema    PAST MEDICAL & SURGICAL HISTORY:  Type 2 diabetes mellitus  Diabetes      Essential hypertension  HTN (hypertension)      CAD (coronary artery disease)      H pylori ulcer      Status post cataract extraction  bilateral      Other postprocedural status  S/P ear surgery      History of coronary artery bypass graft x 2          FAMILY HISTORY:  No pertinent family history in first degree relatives        SOCIAL HISTORY:  Smoking Status: [ ] Current, [ ] Former, [ ] Never  Pack Years:    MEDICATIONS:  Pulmonary:    Antimicrobials:    Anticoagulants:    Onc:    GI/:    Endocrine:    Cardiac:    Other Medications:      Allergies    No Known Allergies    Intolerances        Vital Signs Last 24 Hrs  T(C): 36.1 (20 Nov 2023 15:06), Max: 36.1 (20 Nov 2023 15:06)  T(F): 97 (20 Nov 2023 15:06), Max: 97 (20 Nov 2023 15:06)  HR: 124 (20 Nov 2023 15:06) (120 - 124)  BP: 150/75 (20 Nov 2023 15:06) (129/83 - 150/75)  BP(mean): --  RR: 20 (20 Nov 2023 15:06) (20 - 20)  SpO2: 96% (20 Nov 2023 15:06) (96% - 96%)    Parameters below as of 20 Nov 2023 15:06  Patient On (Oxygen Delivery Method): nasal cannula  O2 Flow (L/min): 3          PHYSICAL EXAM:  Constitutional:  Head:   EENT:   Neck:   Respiratory:   Cardiovascular:   Gastrointestinal:   Extremities:   Vascular:   Neurological:    LABS:      CBC Full  -  ( 20 Nov 2023 15:07 )  WBC Count : 17.41 K/uL  RBC Count : 4.90 M/uL  Hemoglobin : 14.5 g/dL  Hematocrit : 44.7 %  Platelet Count - Automated : 273 K/uL  Mean Cell Volume : 91.2 fl  Mean Cell Hemoglobin : 29.6 pg  Mean Cell Hemoglobin Concentration : 32.4 gm/dL  Auto Neutrophil # : 15.66 K/uL  Auto Lymphocyte # : 0.56 K/uL  Auto Monocyte # : 1.06 K/uL  Auto Eosinophil # : 0.00 K/uL  Auto Basophil # : 0.05 K/uL  Auto Neutrophil % : 89.9 %  Auto Lymphocyte % : 3.2 %  Auto Monocyte % : 6.1 %  Auto Eosinophil % : 0.0 %  Auto Basophil % : 0.3 %                            RADIOLOGY & ADDITIONAL STUDIES: PULMONARY SERVICE INITIAL CONSULT NOTE    HPI:  78M with PMH of 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 Seen today in the office where he reported that he's had increased dyspnea compared to his baseline and does not feel as if he is getting enough oxygen. Has been more agitated recently. Denies recent fevers, has new cough.    REVIEW OF SYSTEMS:  Constitutional: No fever, weight loss or fatigue  Eyes: No eye pain, visual disturbances, or discharge  ENMT:  No difficulty hearing, tinnitus, vertigo; No sinus or throat pain  Neck: No pain, stiffness or neck swelling  Respiratory: see HPI  Cardiovascular: No chest pain, palpitations, dizziness or leg swelling  Gastrointestinal: No abdominal or epigastric pain. No nausea, vomiting or hematemesis; No diarrhea or constipation. No melena or hematochezia.  Genitourinary: No dysuria, frequency, hematuria or incontinence  Neurological: No headaches, memory loss, loss of strength, numbness or tremors  Skin: No itching, burning, rashes or lesions   Lymph Nodes: No enlarged glands  Endocrine: No heat or cold intolerance; No hair loss  Musculoskeletal: No joint pain or swelling; No muscle, back or extremity pain  Psychiatric: No depression, anxiety, mood swings or difficulty sleeping  Heme/Lymph: No easy bruising or bleeding gums  Allergy and Immunologic: No hives or eczema    PAST MEDICAL & SURGICAL HISTORY:  Type 2 diabetes mellitus  Diabetes      Essential hypertension  HTN (hypertension)      CAD (coronary artery disease)      H pylori ulcer      Status post cataract extraction  bilateral      Other postprocedural status  S/P ear surgery      History of coronary artery bypass graft x 2          FAMILY HISTORY:  No pertinent family history in first degree relatives        SOCIAL HISTORY:  Smoking Status: [ ] Current, [x] Former, [ ] Never  Pack Years:    MEDICATIONS:  Pulmonary:    Antimicrobials:    Anticoagulants:    Onc:    GI/:    Endocrine:    Cardiac:    Other Medications:      Allergies    No Known Allergies    Intolerances        Vital Signs Last 24 Hrs  T(C): 36.1 (20 Nov 2023 15:06), Max: 36.1 (20 Nov 2023 15:06)  T(F): 97 (20 Nov 2023 15:06), Max: 97 (20 Nov 2023 15:06)  HR: 124 (20 Nov 2023 15:06) (120 - 124)  BP: 150/75 (20 Nov 2023 15:06) (129/83 - 150/75)  BP(mean): --  RR: 20 (20 Nov 2023 15:06) (20 - 20)  SpO2: 96% (20 Nov 2023 15:06) (96% - 96%)    Parameters below as of 20 Nov 2023 15:06  Patient On (Oxygen Delivery Method): nasal cannula  O2 Flow (L/min): 3          PHYSICAL EXAM:  Constitutional: Thin appearing  Head: NC/AT  EENT: No LAD  Neck: -JVD  Respiratory: Crackles+ bilaterally   Cardiovascular: Tachycardic  Gastrointestinal: Firm  Extremities: No edema  Vascular: 2+ pulses  Neurological: JOHNSON    LABS:      CBC Full  -  ( 20 Nov 2023 15:07 )  WBC Count : 17.41 K/uL  RBC Count : 4.90 M/uL  Hemoglobin : 14.5 g/dL  Hematocrit : 44.7 %  Platelet Count - Automated : 273 K/uL  Mean Cell Volume : 91.2 fl  Mean Cell Hemoglobin : 29.6 pg  Mean Cell Hemoglobin Concentration : 32.4 gm/dL  Auto Neutrophil # : 15.66 K/uL  Auto Lymphocyte # : 0.56 K/uL  Auto Monocyte # : 1.06 K/uL  Auto Eosinophil # : 0.00 K/uL  Auto Basophil # : 0.05 K/uL  Auto Neutrophil % : 89.9 %  Auto Lymphocyte % : 3.2 %  Auto Monocyte % : 6.1 %  Auto Eosinophil % : 0.0 %  Auto Basophil % : 0.3 %                            RADIOLOGY & ADDITIONAL STUDIES: PULMONARY SERVICE INITIAL CONSULT NOTE    HPI:  78M with PMH of 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 Seen today in the office where he reported that he's had increased dyspnea compared to his baseline and does not feel as if he is getting enough oxygen. Has been more agitated recently. Denies recent fevers, has new cough. No orthopnea, LE edema improved.     REVIEW OF SYSTEMS:  Constitutional: No fever, weight loss or fatigue  Eyes: No eye pain, visual disturbances, or discharge  ENMT:  No difficulty hearing, tinnitus, vertigo; No sinus or throat pain  Neck: No pain, stiffness or neck swelling  Respiratory: see HPI  Cardiovascular: No chest pain, palpitations, dizziness or leg swelling  Gastrointestinal: No abdominal or epigastric pain. No nausea, vomiting or hematemesis; No diarrhea or constipation. No melena or hematochezia.  Genitourinary: No dysuria, frequency, hematuria or incontinence  Neurological: No headaches, memory loss, loss of strength, numbness or tremors  Skin: No itching, burning, rashes or lesions   Lymph Nodes: No enlarged glands  Endocrine: No heat or cold intolerance; No hair loss  Musculoskeletal: No joint pain or swelling; No muscle, back or extremity pain  Psychiatric: No depression, anxiety, mood swings or difficulty sleeping  Heme/Lymph: No easy bruising or bleeding gums  Allergy and Immunologic: No hives or eczema    PAST MEDICAL & SURGICAL HISTORY:  Type 2 diabetes mellitus  Diabetes      Essential hypertension  HTN (hypertension)      CAD (coronary artery disease)      H pylori ulcer      Status post cataract extraction  bilateral      Other postprocedural status  S/P ear surgery      History of coronary artery bypass graft x 2          FAMILY HISTORY:  No pertinent family history in first degree relatives        SOCIAL HISTORY:  Smoking Status: [ ] Current, [x] Former, [ ] Never  Pack Years:    MEDICATIONS:  Pulmonary:    Antimicrobials:    Anticoagulants:    Onc:    GI/:    Endocrine:    Cardiac:    Other Medications:      Allergies    No Known Allergies    Intolerances        Vital Signs Last 24 Hrs  T(C): 36.1 (20 Nov 2023 15:06), Max: 36.1 (20 Nov 2023 15:06)  T(F): 97 (20 Nov 2023 15:06), Max: 97 (20 Nov 2023 15:06)  HR: 124 (20 Nov 2023 15:06) (120 - 124)  BP: 150/75 (20 Nov 2023 15:06) (129/83 - 150/75)  BP(mean): --  RR: 20 (20 Nov 2023 15:06) (20 - 20)  SpO2: 96% (20 Nov 2023 15:06) (96% - 96%)    Parameters below as of 20 Nov 2023 15:06  Patient On (Oxygen Delivery Method): nasal cannula  O2 Flow (L/min): 3          PHYSICAL EXAM:  Constitutional: Thin appearing  Head: NC/AT  EENT: No LAD  Neck: -JVD  Respiratory: Crackles+ bilaterally   Cardiovascular: Tachycardic  Gastrointestinal: Firm  Extremities: No edema  Vascular: 2+ pulses  Neurological: JOHNSON    LABS:      CBC Full  -  ( 20 Nov 2023 15:07 )  WBC Count : 17.41 K/uL  RBC Count : 4.90 M/uL  Hemoglobin : 14.5 g/dL  Hematocrit : 44.7 %  Platelet Count - Automated : 273 K/uL  Mean Cell Volume : 91.2 fl  Mean Cell Hemoglobin : 29.6 pg  Mean Cell Hemoglobin Concentration : 32.4 gm/dL  Auto Neutrophil # : 15.66 K/uL  Auto Lymphocyte # : 0.56 K/uL  Auto Monocyte # : 1.06 K/uL  Auto Eosinophil # : 0.00 K/uL  Auto Basophil # : 0.05 K/uL  Auto Neutrophil % : 89.9 %  Auto Lymphocyte % : 3.2 %  Auto Monocyte % : 6.1 %  Auto Eosinophil % : 0.0 %  Auto Basophil % : 0.3 %                            RADIOLOGY & ADDITIONAL STUDIES: PULMONARY SERVICE INITIAL CONSULT NOTE    HPI:  78M with PMH of 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 Seen today in the office where he reported that he's had increased dyspnea compared to his baseline and does not feel as if he is getting enough oxygen. Has been more agitated recently. Denies recent fevers, has new cough. No orthopnea, LE edema improved.     REVIEW OF SYSTEMS:  Constitutional: No fever, weight loss or fatigue  Eyes: No eye pain, visual disturbances, or discharge  ENMT:  No difficulty hearing, tinnitus, vertigo; No sinus or throat pain  Neck: No pain, stiffness or neck swelling  Respiratory: see HPI  Cardiovascular: No chest pain, palpitations, dizziness or leg swelling  Gastrointestinal: No abdominal or epigastric pain. No nausea, vomiting or hematemesis; No diarrhea or constipation. No melena or hematochezia.  Genitourinary: No dysuria, frequency, hematuria or incontinence  Neurological: No headaches, memory loss, loss of strength, numbness or tremors  Skin: No itching, burning, rashes or lesions   Lymph Nodes: No enlarged glands  Endocrine: No heat or cold intolerance; No hair loss  Musculoskeletal: No joint pain or swelling; No muscle, back or extremity pain  Psychiatric: No depression, anxiety, mood swings or difficulty sleeping  Heme/Lymph: No easy bruising or bleeding gums  Allergy and Immunologic: No hives or eczema    PAST MEDICAL & SURGICAL HISTORY:  Type 2 diabetes mellitus  Diabetes      Essential hypertension  HTN (hypertension)      CAD (coronary artery disease)      H pylori ulcer      Status post cataract extraction  bilateral      Other postprocedural status  S/P ear surgery      History of coronary artery bypass graft x 2          FAMILY HISTORY:  No pertinent family history in first degree relatives        SOCIAL HISTORY:  Smoking Status: [ ] Current, [x] Former, [ ] Never  Pack Years:    MEDICATIONS:  Pulmonary:    Antimicrobials:    Anticoagulants:    Onc:    GI/:    Endocrine:    Cardiac:    Other Medications:      Allergies    No Known Allergies    Intolerances        Vital Signs Last 24 Hrs  T(C): 36.1 (20 Nov 2023 15:06), Max: 36.1 (20 Nov 2023 15:06)  T(F): 97 (20 Nov 2023 15:06), Max: 97 (20 Nov 2023 15:06)  HR: 124 (20 Nov 2023 15:06) (120 - 124)  BP: 150/75 (20 Nov 2023 15:06) (129/83 - 150/75)  BP(mean): --  RR: 20 (20 Nov 2023 15:06) (20 - 20)  SpO2: 96% (20 Nov 2023 15:06) (96% - 96%)    Parameters below as of 20 Nov 2023 15:06  Patient On (Oxygen Delivery Method): nasal cannula  O2 Flow (L/min): 3          PHYSICAL EXAM:  Constitutional: Thin appearing  Head: NC/AT  EENT: No LAD  Neck: -JVD  Respiratory: Crackles+ bilaterally   Cardiovascular: Tachycardic  Gastrointestinal: Firm  Extremities: No edema  Vascular: 2+ pulses  Neurological: JOHNSON    LABS:      CBC Full  -  ( 20 Nov 2023 15:07 )  WBC Count : 17.41 K/uL  RBC Count : 4.90 M/uL  Hemoglobin : 14.5 g/dL  Hematocrit : 44.7 %  Platelet Count - Automated : 273 K/uL  Mean Cell Volume : 91.2 fl  Mean Cell Hemoglobin : 29.6 pg  Mean Cell Hemoglobin Concentration : 32.4 gm/dL  Auto Neutrophil # : 15.66 K/uL  Auto Lymphocyte # : 0.56 K/uL  Auto Monocyte # : 1.06 K/uL  Auto Eosinophil # : 0.00 K/uL  Auto Basophil # : 0.05 K/uL  Auto Neutrophil % : 89.9 %  Auto Lymphocyte % : 3.2 %  Auto Monocyte % : 6.1 %  Auto Eosinophil % : 0.0 %  Auto Basophil % : 0.3 %                            RADIOLOGY & ADDITIONAL STUDIES:

## 2023-11-20 NOTE — H&P ADULT - PROBLEM SELECTOR PLAN 4
Patient with pedal edema, S3 on physical exam, CT with concern for possible CHF.   Patient was scheduled for a TTE outpatient by Dr. Alanis, however the TTE was scheduled for 11/21 while patient is at [facility].   - obtain TTE inpatient to assess for CHF and possible pulm HTN given pulmonary fibrosis.   - caution with fluids while awaiting TTE

## 2023-11-20 NOTE — H&P ADULT - PROBLEM SELECTOR PLAN 3
Patient with hyponatremia, appears to be chronic and stable. Na on admission 134, was previously 133 11/20/23. Likely multifactorial, given pirfenidone known to cause hyponatremia vs hypovolemia vs SIADH.   Plan:  - No acute intervention at this time    - continue to monitor Na

## 2023-11-20 NOTE — H&P ADULT - NSHPPHYSICALEXAM_GEN_ALL_CORE
T(C): 36.1 (11-20-23 @ 15:06), Max: 36.1 (11-20-23 @ 15:06)  HR: 109 (11-20-23 @ 18:53) (109 - 124)  BP: 162/85 (11-20-23 @ 18:53) (129/83 - 162/85)  RR: 18 (11-20-23 @ 18:53) (16 - 20)  SpO2: 96% (11-20-23 @ 18:53) (96% - 97%)    General: NAD, laying in bed, speaking in full sentences  HEENT: head NC/AT, no conjunctival injection, EOMI, MMM  Neck: supple, no JVD  Cardio: RRR, +S1/S2, no M/R/G  Resp: lungs CTAB, no cough/wheezes/rales/rhonchi  Abdo: soft, NT, ND, +bowel sounds x4, no organomegaly or palpable mass    Extremities: WWP, no edema/cyanosis/clubbing   Vasc: 2+ radial and DP pulses b/l  Neuro: A&Ox3  Psych: speech non-pressured, thoughts goal-oriented  Skin: dry, intact, no visible jaundice   MSK: no joint swelling T(C): 36.1 (11-20-23 @ 15:06), Max: 36.1 (11-20-23 @ 15:06)  HR: 109 (11-20-23 @ 18:53) (109 - 124)  BP: 162/85 (11-20-23 @ 18:53) (129/83 - 162/85)  RR: 18 (11-20-23 @ 18:53) (16 - 20)  SpO2: 96% (11-20-23 @ 18:53) (96% - 97%)    General: cachectic older gentleman, NAD, laying in bed, speaking in full sentences  HEENT: head NC/AT, no conjunctival injection, EOMI, MMM  Neck: supple, no JVD  Cardio: RRR, +S1/S2, no M/R/G  Resp: crackles appreciated in right lower lobe, other lung fields CTA, no crackles or wheezing.   Abdo: soft, NT, ND, +bowel sounds x4, no organomegaly or palpable mass    Extremities: WWP, no edema/cyanosis/clubbing   Vasc: 2+ radial and DP pulses b/l  Neuro: A&Ox3  Psych: speech non-pressured, thoughts goal-oriented  Skin: dry, intact, no visible jaundice   MSK: no joint swelling

## 2023-11-20 NOTE — ED ADULT NURSE NOTE - NSFALLRISKINTERV_ED_ALL_ED

## 2023-11-20 NOTE — PATIENT PROFILE ADULT - FALL HARM RISK - RISK INTERVENTIONS

## 2023-11-20 NOTE — H&P ADULT - PROBLEM SELECTOR PLAN 7
Patient no longer on any meds for HTN. BP on admission 129/83-->150/75. Patient was on amlodipine, however states that he has not taken it due to development of edema in LE.   Plan:  - monitor BP The patient has a Hx of Diabetes Mellitus. On outpatient Jardiance 25mg. A1c on this admission is 7.7  Plan:  - Monitor glucose   - c/w mISS

## 2023-11-20 NOTE — CONSULT NOTE ADULT - ASSESSMENT
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  CT chest 7/18/2023 personally reviewed and d/w radiology previously:  1. Mild distal esophageal wall thickening, possibly inflammatory related to gastroesophageal reflux given small hiatal hernia, neoplasia cannot be completely excluded. Consider endoscopic correlation.  2. No suspicious adenopathy or evidence of metastatic disease.  3. Unchanged fibrotic interstitial lung disease with basilar predominance.  ?  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  CT chest 7/18/2023 personally reviewed and d/w radiology previously:  1. Mild distal esophageal wall thickening, possibly inflammatory related to gastroesophageal reflux given small hiatal hernia, neoplasia cannot be completely excluded. Consider endoscopic correlation.  2. No suspicious adenopathy or evidence of metastatic disease.  3. Unchanged fibrotic interstitial lung disease with basilar predominance.  ?  PFT 5/24/22: FVC 1.35L (42%), FEV1 1.28L (57%), TLC 2.18L (37%), DLCO x / FENO 44 / 264m desat to 84%  PFT 5/24/22: FVC 1.12L (36%), FEV1 1.04L (47%), TLC 2.40L (41%), DLCO x / 312m desat to 83%, titrated to     #Post covid fibrosis  #Pneumonia  #AHRF    Here with increased dyspnea from baseline and worsening cough with and increased oxygen requirements from baseline with new leukocytosis and lobar consolidation. Suspect AHRF in the setting due to bacterial pneumonia. Not on chronic steroids/immunosuppression so low suspicion for PCP pneumonia. Euvolemic on exam, -JVD, no LE edema no effusions so low suspicion for fluid overload. No new ground glass on CT scan to suggest acute ILD flare.  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  CT chest 7/18/2023 personally reviewed and d/w radiology previously:  1. Mild distal esophageal wall thickening, possibly inflammatory related to gastroesophageal reflux given small hiatal hernia, neoplasia cannot be completely excluded. Consider endoscopic correlation.  2. No suspicious adenopathy or evidence of metastatic disease.  3. Unchanged fibrotic interstitial lung disease with basilar predominance.  ?  PFT 5/24/22: FVC 1.35L (42%), FEV1 1.28L (57%), TLC 2.18L (37%), DLCO x / FENO 44 / 264m desat to 84%  PFT 5/24/22: FVC 1.12L (36%), FEV1 1.04L (47%), TLC 2.40L (41%), DLCO x / 312m desat to 83%, titrated to     #Post covid fibrosis  #Pneumonia  #AHRF    Here with increased dyspnea from baseline and worsening cough with and increased oxygen requirements from baseline with new leukocytosis and lobar consolidation. Suspect AHRF in the setting due to bacterial pneumonia. Not on chronic steroids/immunosuppression so low suspicion for PCP pneumonia. Euvolemic on exam, -JVD, no LE edema no effusions so low suspicion for fluid overload. No new ground glass on CT scan to suggest acute ILD flare.  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  CT chest 7/18/2023 personally reviewed and d/w radiology previously:  1. Mild distal esophageal wall thickening, possibly inflammatory related to gastroesophageal reflux given small hiatal hernia, neoplasia cannot be completely excluded. Consider endoscopic correlation.  2. No suspicious adenopathy or evidence of metastatic disease.  3. Unchanged fibrotic interstitial lung disease with basilar predominance.  ?  PFT 5/24/22: FVC 1.35L (42%), FEV1 1.28L (57%), TLC 2.18L (37%), DLCO x / FENO 44 / 264m desat to 84%  PFT 5/24/22: FVC 1.12L (36%), FEV1 1.04L (47%), TLC 2.40L (41%), DLCO x / 312m desat to 83%, titrated to     #Post covid fibrosis  #Pneumonia  #AHRF    Here with increased dyspnea from baseline and worsening cough with and increased oxygen requirements from baseline with new leukocytosis and lobar consolidation. Suspect AHRF in the setting due to bacterial pneumonia. Not on chronic steroids/immunosuppression so low suspicion for PCP pneumonia. Euvolemic on exam, -JVD, trace edema (reportedly improving), no effusions so low suspicion for fluid overload. No new ground glass on CT scan to suggest acute ILD flare.  - Agree with empiric coverage with broad spectrum antibiotics for now  - please send MRSA swab, urine legionella, strep antigen  - f/u cultures  - no need for pulse dose steroids at this time  - would favor keeping zosyn on given underlying structural lung disease  - PPI  - continue prifendione

## 2023-11-20 NOTE — CONSULT NOTE ADULT - ASSESSMENT
INCOMPLETE.....    Recommendations:   - continue with vanc and zosyn. please ensure zosyn is dosed for pseudomonal coverage 4.5g q8hrs  - sputum Cx if able to produce  - please collect urine strep and legionella  - please obtain MRSA swab INCOMPLETE.....    Oulmonary  #AHRF 2/2 bacterial PNA   Patient presented to Dr. Carter's office with complaints of dyspnea at rest, a new cough, and increased agitation as per wife who is at bedside. Wife reporting that patient presentation is similar to prior hx of PNA. Patient was recently hospitalized at [facility] 9/11-9/15 and treated for bacterial PNA, discharged on cefpodoxime. On admission, CTPE was done to rule out PE; showed evidence of RML and RLL PNA on  - continue with vanc and zosyn. please ensure zosyn is dosed for pseudomonal coverage 4.5g q8hrs  - sputum Cx if able to produce  - please collect urine strep and legionella  - please obtain MRSA swab INCOMPLETE.....    Pulmonary  #AHRF 2/2 bacterial PNA   Patient presented to Dr. Carter's office with complaints of dyspnea at rest, a new cough, and increased agitation as per wife who is at bedside. Wife reporting that patient presentation is similar to prior hx of PNA. Patient was recently hospitalized at [facility] 9/11-9/15 and treated for bacterial PNA, discharged on cefpodoxime. On admission, CTPE was done to rule out PE; showed evidence of RML and RLL PNA on  - continue with vanc and zosyn. 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 Carter outpatient with concerns of AHRF 2/2 PNA vs. PE vs. worsening pulmonary fibrosis, found to have RML and RLL consolidation concerning for PNA, on a background of pulmonary fibrosis, admitted for IV antibiotics with a trial of NIV if needed.     Neuro  AAOx3, MARSHALL    Pulmonary  #AHRF 2/2 bacterial PNA   Patient presented to Dr. Carter's office with complaints of dyspnea at rest, a new cough, and increased agitation as per wife who is at bedside. Wife reporting that patient presentation is similar to prior hx of PNA. Patient was recently hospitalized at [facility] 9/11-9/15 and treated for bacterial PNA, discharged on cefpodoxime. On admission, CTPE was done to rule out PE; showed evidence of RML and RLL PNA on  - continue with vanc and zosyn. 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 Carter outpatient with concerns of AHRF 2/2 PNA vs. PE vs. worsening pulmonary fibrosis, found to have RML and RLL consolidation concerning for PNA, on a background of pulmonary fibrosis, admitted for IV antibiotics with a trial of NIV if needed.     Neuro  AAOx3, MARSHALL    Cardiovascular   #?CHF  Patient with pedal edema, S3 on physical exam, CT with concern for possible CHF. Patient was scheduled for a TTE outpatient by Dr. Alanis, however the TTE was scheduled for 11/21 while patient is at [facility].   - obtain TTE inpatient to assess for CHF and possible pulm HTN given pulmonary fibrosis.     #CAD  Patient with hx of CABG x2 (LIMA to LAD), as well as PCI to LCx and LAD. Patient on asa 81mg qd.   - c/w asa 81mg qd    #HTN  Patient no longer on any meds for HTN. BP on admission 129/83-->150/75. Patient was on amlodipine   - monitor BP    #HLD  Patient on rosuvastatin 10mg qhs.  - c/w atorvastatin 40mg qhs TI for rosuvastatin 10mg qhs     Pulmonary  #AHRF 2/2 bacterial PNA   Patient presented to Dr. Carter's office with complaints of dyspnea at rest, a new cough, and increased agitation as per wife who is at bedside. Wife reporting that patient presentation is similar to prior hx of PNA. Patient was recently hospitalized at [facility] 9/11-9/15 and treated for bacterial PNA, discharged on cefpodoxime. As per wife, barium swallow done a few weeks prior to presentation was concerning for aspiration. Patient also with recent travel hx to [place] to visit daughter at [employer].  On admission, CTPE was done to rule out PE; showed evidence of RML and RLL consolidation on a background of pulmonary fibrosis. RVP and BCx obtained in the ED.   - Given patient has pulmonary fibrosis and was treated with cefpodoxime in 9/2023 for PNA, recommend broad spectrum antibiotics   - c/w vanc 750mg qd and zosyn 4.5g q8hrs (pseudomonal dosing) given recent antibiotic use and pulmonary fibrosis   - vanc trough prior to 4th dose (if still on vanc)  - please collect sputum Cx if able to produce  - please collect urine strep and legionella  - please obtain MRSA swab  - patient on home O2 3LNC, wean to PRN when ambulating if possible  - patient is DNR/DNI, however willing to trial non-invasive ventilation if needed. 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 Carter outpatient, currently on pirfenidone 267mg 3 tablets TID.  - c/w pirfenidone 267mg 3 tablets TID  - c/w protonix 40mg qd GI ppx while on pirfenidone   - pulm following, appreciate recs     GI  #GI PPx  Patient on protonix 40mg qd for GI ppx while on pirfenidone.  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 Carter outpatient with concerns of AHRF 2/2 PNA vs. PE vs. worsening pulmonary fibrosis, found to have RML and RLL consolidation concerning for PNA, on a background of pulmonary fibrosis, admitted for IV antibiotics with a trial of NIV if needed.     Neuro  AAOx3, MARSHALL    Cardiovascular   #?CHF  Patient with pedal edema, S3 on physical exam, CT with concern for possible CHF.   Patient was scheduled for a TTE outpatient by Dr. Alanis, however the TTE was scheduled for 11/21 while patient is at [facility].   - obtain TTE inpatient to assess for CHF and possible pulm HTN given pulmonary fibrosis.   - caution with fluids while awaiting TTE     #CAD  Patient with hx of CABG x2 (LIMA to LAD), as well as PCI to LCx and LAD. Patient on asa 81mg qd.   - c/w asa 81mg qd    #HTN  Patient no longer on any meds for HTN. BP on admission 129/83-->150/75. Patient was on amlodipine   - monitor BP    #HLD  Patient on rosuvastatin 10mg qhs.  - c/w atorvastatin 40mg qhs TI for rosuvastatin 10mg qhs     Pulmonary  #AHRF 2/2 bacterial PNA   Patient presented to Dr. Carter's office with complaints of dyspnea at rest, a new cough, and increased agitation as per wife who is at bedside.   Wife reporting that patient presentation is similar to prior hx of PNA; recently hospitalized at [facility] 9/11-9/15 and treated for bacterial PNA, discharged on cefpodoxime. PNA could be 2/2 to aspiration, as per wife barium swallow done a few weeks prior to presentation was concerning for aspiration vs. recent travel hx to [place] to visit daughter at [employer].  On admission, patient was tachypneic, tachycardic, with increasing O2 requirements. CTPE was done to rule out PE; no evidence of PE, however CT showed evidence of RML and RLL consolidation on a background of pulmonary fibrosis. RVP and BCx obtained in the ED.   - Given patient has pulmonary fibrosis and was treated with cefpodoxime in 9/2023 for PNA, recommend broad spectrum antibiotics with pseudomonal coverage   - c/w vanc 750mg qd and zosyn 4.5g q8hrs (pseudomonal dosing) given recent antibiotic use and pulmonary fibrosis   - vanc trough prior to 4th dose (if still on vanc)  - please collect sputum Cx, if able to produce  - please collect urine strep and legionella  - please obtain MRSA swab  - patient on home O2 3LNC only while ambulating, wean to PRN when ambulating if possible  - patient is DNR/DNI, however willing to trial non-invasive ventilation if needed. 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 Carter outpatient, currently on pirfenidone 267mg 3 tablets TID.  - c/w pirfenidone 267mg 3 tablets TID  - c/w protonix 40mg qd GI ppx while on pirfenidone   - pulm following, appreciate recs     GI  #GI PPx  Patient on protonix 40mg qd for GI ppx while on pirfenidone.  - c/w protonix 40mg qd     Renal  #hyponatremia  Patient with hyponatremia, appears to be chronic. Na on admission 134, was previously 133 11/20/23. Likely multifactorial, given pirfenidone known to cause hyponatremia   - continue to monitor Na     ID  #Sepsis 2/2 bacterial PNA  Patient met 2/4 SIRS criteria on admission with 's, WBC 17, with increasing O2 requirements, with a possible source of PNA seen on CT. BCx and RVP obtained in the ED.   Patient received vanc 1g and zosyn 3.375g x1 in the ED. Patient clinically improving in appearance.   - f/u BCx's and RVP  - please obtain UA with reflex Cx  - c/w vanc 750mg qd and zosyn 4.5g q8hrs   - f/u MRSA swab, urine strep and legionella     Heme/Onc  MARSHALL    Prophylactic Measures:  F: none, caution given concern for CHF  E: replete as needed  N: minced and moist, pending S&S eval  DVT ppx: lovenox   GI ppx: protonix 40mg qd     Dispo: 7 [facility]  Code Status: DNR/DNI with a trial of NIV

## 2023-11-20 NOTE — H&P ADULT - ATTENDING COMMENTS
DM2, HTN, CAD with stents, pulmonary fibrosis after COVID pneumonia with acute on chronic respiratory failure with new RLL consolidation, elevated HR and leukocytosis c/w sepsis secondary to RLL pna  physical as above  continue zosyn and vanco, pending MRSA swab and ZTX pending urine Legionella  supplemental oxygen as above  no evidence of PE  continue pirfenidone  continue ASA  admit to telemetry  decision making of high complexity

## 2023-11-20 NOTE — H&P ADULT - NSHPLABSRESULTS_GEN_ALL_CORE
.  LABS:                         14.5   17.41 )-----------( 273      ( 20 Nov 2023 15:07 )             44.7     11-20    134<L>  |  93<L>  |  23  ----------------------------<  174<H>  4.9   |  35<H>  |  0.59    Ca    9.9      20 Nov 2023 18:28      PT/INR - ( 20 Nov 2023 15:07 )   PT: 10.8 sec;   INR: 0.95          PTT - ( 20 Nov 2023 15:07 )  PTT:32.1 sec  Urinalysis Basic - ( 20 Nov 2023 18:28 )    Color: x / Appearance: x / SG: x / pH: x  Gluc: 174 mg/dL / Ketone: x  / Bili: x / Urobili: x   Blood: x / Protein: x / Nitrite: x   Leuk Esterase: x / RBC: x / WBC x   Sq Epi: x / Non Sq Epi: x / Bacteria: x                RADIOLOGY, EKG & ADDITIONAL TESTS: Reviewed.

## 2023-11-20 NOTE — ED PROVIDER NOTE - PHYSICAL EXAMINATION
general: Well appearing, in no acute distress  HEENT: Normocephalic, atraumatic, extraocular movements intact  CV: Regular rate  Pulm: No respiratory distress, mild tachypnea  Abd: Flat, no gross distension, soft, nontender, no r/g  Ext: warm and well perfused  Skin: No gross rashes or lesions  Neuro: Alert and oriented, moving all extremities

## 2023-11-20 NOTE — CONSULT NOTE ADULT - SUBJECTIVE AND OBJECTIVE BOX
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 Patient has been following up with Dr. Carter since his diagnosis of pulmonary fibrosis. He was initially on ofev, but was recently started on pirfenidone 8/2023 for his pulmonary fibrosis. Patient was recently treated for a bacterial PNA at [facility] 9/11-9/15 and was discharged on cefpodoxime to complete a course of antibiotics. Patient was seen by Dr. Carter outpatient with complaints of dyspnea at rest, increasing O2 requirements (requiring O2 while at rest, not just when ambulating), increased agitation. Wife at bedside reported that patient's presentation was similar to his previous episode of PNA in September. Given patient's presentation, he was sent to the [facility] ED.     In the [facility] ED, ICU was consulted given concerns for AHRF 2/2 PNA vs. PE. CT PE was obtained to r/o PE; it showed RML and RLL consolidation concerning for PNA, on a background of pulmonary fibrosis. Patient was started on broad spectrum antibiotics vanc and zosyn, BCx's and an RVP were obtained. Upon examination, patient was lying down in bed comfortably, speaking in full sentences, satting 100% on 3LNC. Patient expressed wishes for DNR/DNI with a trial of NIV.       PAST MEDICAL & SURGICAL HISTORY:  Type 2 diabetes mellitus  Diabetes  Essential hypertension  HTN (hypertension)  CAD (coronary artery disease)  H pylori ulcer  Status post cataract extraction  bilateral  Other postprocedural status  S/P ear surgery  History of coronary artery bypass graft x 2    ED vitals: T   HR   RR  BP  SpO2  Labs signficant:  Imaging/EKG:   Interventions:    Allergies    No Known Allergies    Intolerances    Home Medications:       SOCIAL HX:     Smoking          ETOH/Illicit drugs          Occupation    PAST MEDICAL & SURGICAL HISTORY:  Type 2 diabetes mellitus  Diabetes  Essential hypertension  HTN (hypertension)  CAD (coronary artery disease)  H pylori ulcer  Status post cataract extraction  bilateral  Other postprocedural status  S/P ear surgery  History of coronary artery bypass graft x 2    FAMILY HISTORY:  No pertinent family history in first degree relatives    :      ROS:  See HPI     ICU Vital Signs Last 24 Hrs  T(C): 36.1 (20 Nov 2023 15:06), Max: 36.1 (20 Nov 2023 15:06)  T(F): 97 (20 Nov 2023 15:06), Max: 97 (20 Nov 2023 15:06)  HR: 110 (20 Nov 2023 17:46) (110 - 124)  BP: 130/63 (20 Nov 2023 17:46) (129/83 - 150/75)  BP(mean): --  ABP: --  ABP(mean): --  RR: 16 (20 Nov 2023 17:46) (16 - 20)  SpO2: 97% (20 Nov 2023 17:46) (96% - 97%)    O2 Parameters below as of 20 Nov 2023 17:46  Patient On (Oxygen Delivery Method): nasal cannula  O2 Flow (L/min): 3    PHYSICAL EXAM  General: cachectic, speaking in full sentences on 3LNC, appears comfortable    Head: NC/AT; dry MM   Respiratory: fine crackles in the RLL fields, no wheezing   Cardiovascular: sinus tachycardia, R-sided S3   Gastrointestinal: Soft; NTND   Extremities: WWP; + pedal pitting   Neurological: A&Ox3 to person, place, and time       LABS:                          14.5   17.41 )-----------( 273      ( 20 Nov 2023 15:07 )             44.7                                               11-20    133<L>  |  90<L>  |  24<H>  ----------------------------<  217<H>  See Note   |  31  |  0.54    Ca    10.7<H>      20 Nov 2023 15:07        PT/INR - ( 20 Nov 2023 15:07 )   PT: 10.8 sec;   INR: 0.95          PTT - ( 20 Nov 2023 15:07 )  PTT:32.1 sec    Urinalysis Basic - ( 20 Nov 2023 15:07 )    Color: x / Appearance: x / SG: x / pH: x  Gluc: 217 mg/dL / Ketone: x  / Bili: x / Urobili: x   Blood: x / Protein: x / Nitrite: x   Leuk Esterase: x / RBC: x / WBC x   Sq Epi: x / Non Sq Epi: x / Bacteria: x    MEDICATIONS  (STANDING):    MEDICATIONS  (PRN):         =============== ICU Consult ====================    Consult reason: AHRF     HPI:   Patient is a 79 y/o M with pmhx of HTN, HLD, DM, CAD s/p CABG x2 and PCI LCx and LAD, PAD, hx of GIB, and 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 Patient has been following up with Dr. Carter since his diagnosis of pulmonary fibrosis. He was initially on ofev, but was recently started on pirfenidone 8/2023 for his pulmonary fibrosis. Patient was recently treated for a bacterial PNA at [facility] 9/11-9/15 and was discharged on cefpodoxime to complete a course of antibiotics. Patient was seen by Dr. Carter outpatient with complaints of dyspnea at rest, increasing O2 requirements (requiring O2 while at rest, not just when ambulating), increased agitation. Wife at bedside reported that patient's presentation was similar to his previous episode of PNA in September. Given patient's presentation, he was sent to the [facility] ED.     In the [facility] ED, ICU was consulted given concerns for AHRF 2/2 PNA vs. PE vs. worsening pulmonary fibrosis. CT PE was obtained to r/o PE; it showed RML and RLL consolidation concerning for PNA, on a background of pulmonary fibrosis. Patient was started on broad spectrum antibiotics vanc and zosyn, BCx's and an RVP were obtained. Upon examination, patient was lying down in bed comfortably, speaking in full sentences, satting 100% on 3LNC. Patient expressed wishes for DNR/DNI with a trial of NIV.       PAST MEDICAL & SURGICAL HISTORY:  Type 2 diabetes mellitus  Diabetes  Essential hypertension  HTN (hypertension)  CAD (coronary artery disease)  H pylori ulcer  Status post cataract extraction  bilateral  Other postprocedural status  S/P ear surgery  History of coronary artery bypass graft x 2    ED vitals: T   HR   RR  BP  SpO2  Labs signficant:  Imaging/EKG:   Interventions:    Allergies    No Known Allergies    Intolerances    Home Medications:   protonix 40mg qd  asa 81mg qd  jardiance 25mg qd   rosuvastatin 10mg qhs  mirtazapine 15mg qhs  benzonatate 200mg TID PRN cough  veltassa 8.4g 3 days per week (M/W/F)  pirfenidone 267mg 3 tabs TID  bisacodyl 5mg 1 tab PRN constipation  senna 8.6mg 2 tabs PRN constipation    SOCIAL HX:     Smoking          ETOH/Illicit drugs          Occupation    PAST MEDICAL & SURGICAL HISTORY:  Type 2 diabetes mellitus  Diabetes  Essential hypertension  HTN (hypertension)  CAD (coronary artery disease)  H pylori ulcer  Status post cataract extraction  bilateral  Other postprocedural status  S/P ear surgery  History of coronary artery bypass graft x 2    FAMILY HISTORY:  No pertinent family history in first degree relatives      ROS:  See HPI     ICU Vital Signs Last 24 Hrs  T(C): 36.1 (20 Nov 2023 15:06), Max: 36.1 (20 Nov 2023 15:06)  T(F): 97 (20 Nov 2023 15:06), Max: 97 (20 Nov 2023 15:06)  HR: 110 (20 Nov 2023 17:46) (110 - 124)  BP: 130/63 (20 Nov 2023 17:46) (129/83 - 150/75)  BP(mean): --  ABP: --  ABP(mean): --  RR: 16 (20 Nov 2023 17:46) (16 - 20)  SpO2: 97% (20 Nov 2023 17:46) (96% - 97%)    O2 Parameters below as of 20 Nov 2023 17:46  Patient On (Oxygen Delivery Method): nasal cannula  O2 Flow (L/min): 3    PHYSICAL EXAM  General: cachectic, speaking in full sentences on 3LNC, appears comfortable    Head: NC/AT; dry MM   Respiratory: fine crackles in the RLL fields, no wheezing   Cardiovascular: sinus tachycardia, R-sided S3   Gastrointestinal: Soft; NTND   Extremities: WWP; + pedal pitting   Neurological: A&Ox3 to person, place, and time       LABS:                          14.5   17.41 )-----------( 273      ( 20 Nov 2023 15:07 )             44.7                                               11-20    133<L>  |  90<L>  |  24<H>  ----------------------------<  217<H>  See Note   |  31  |  0.54    Ca    10.7<H>      20 Nov 2023 15:07        PT/INR - ( 20 Nov 2023 15:07 )   PT: 10.8 sec;   INR: 0.95          PTT - ( 20 Nov 2023 15:07 )  PTT:32.1 sec    Urinalysis Basic - ( 20 Nov 2023 15:07 )    Color: x / Appearance: x / SG: x / pH: x  Gluc: 217 mg/dL / Ketone: x  / Bili: x / Urobili: x   Blood: x / Protein: x / Nitrite: x   Leuk Esterase: x / RBC: x / WBC x   Sq Epi: x / Non Sq Epi: x / Bacteria: x    MEDICATIONS  (STANDING):    MEDICATIONS  (PRN):         =============== ICU Consult ====================    Consult reason: AHRF     HPI:   Patient is a 77 y/o M with pmhx of HTN, HLD, DM, CAD s/p CABG x2 and PCI LCx and LAD, PAD, hx of GIB, and 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 Patient has been following up with Dr. Carter since his diagnosis of pulmonary fibrosis. He was initially on ofev, but was recently started on pirfenidone 8/2023 for his pulmonary fibrosis. Patient was recently treated for a bacterial PNA at [facility] 9/11-9/15 and was discharged on cefpodoxime to complete a course of antibiotics. Patient was seen by Dr. Carter outpatient with complaints of dyspnea at rest, increasing O2 requirements (requiring O2 while at rest, not just when ambulating), increased agitation. Wife at bedside reported that patient's presentation was similar to his previous episode of PNA in September. Given patient's presentation, he was sent to the [facility] ED.     In the [facility] ED, ICU was consulted given concerns for AHRF 2/2 PNA vs. PE vs. worsening pulmonary fibrosis. CT PE was obtained to r/o PE; it showed RML and RLL consolidation concerning for PNA, on a background of pulmonary fibrosis. Patient was started on broad spectrum antibiotics vanc and zosyn, BCx's and an RVP were obtained. Upon examination, patient was lying down in bed comfortably, speaking in full sentences, satting 100% on 3LNC. Patient expressed wishes for DNR/DNI with a trial of NIV.     PAST MEDICAL & SURGICAL HISTORY:  Type 2 diabetes mellitus  Diabetes  Essential hypertension  HTN (hypertension)  CAD (coronary artery disease)  H pylori ulcer  Status post cataract extraction  bilateral  Other postprocedural status  S/P ear surgery  History of coronary artery bypass graft x 2    ED vitals: T 97   -->124 RR 20 /83 SpO2 96% on 6LNC --> 100% on 3L NC  Labs significant for: WBC 17.4, pro-, RVP pending, BCx collected   Imaging/EKG: CTPE with RML and RLL consolidations on a background of pulmonary fibrosis, cannot exclude congestive heart failure.   Interventions: vanc 1g x1, zosyn 3.375g x1 in the ED     Allergies    No Known Allergies    Intolerances    Home Medications:   protonix 40mg qd  asa 81mg qd  jardiance 25mg qd   rosuvastatin 10mg qhs  mirtazapine 15mg qhs  benzonatate 200mg TID PRN cough  veltassa 8.4g 3 days per week (M/W/F)  pirfenidone 267mg 3 tabs TID  bisacodyl 5mg 1 tab PRN constipation  senna 8.6mg 2 tabs PRN constipation    SOCIAL HX:     Smoking          ETOH/Illicit drugs          Occupation    PAST MEDICAL & SURGICAL HISTORY:  Type 2 diabetes mellitus  Diabetes  Essential hypertension  HTN (hypertension)  CAD (coronary artery disease)  H pylori ulcer  Status post cataract extraction  bilateral  Other postprocedural status  S/P ear surgery  History of coronary artery bypass graft x 2    FAMILY HISTORY:  No pertinent family history in first degree relatives      ROS:  See HPI     ICU Vital Signs Last 24 Hrs  T(C): 36.1 (20 Nov 2023 15:06), Max: 36.1 (20 Nov 2023 15:06)  T(F): 97 (20 Nov 2023 15:06), Max: 97 (20 Nov 2023 15:06)  HR: 110 (20 Nov 2023 17:46) (110 - 124)  BP: 130/63 (20 Nov 2023 17:46) (129/83 - 150/75)  BP(mean): --  ABP: --  ABP(mean): --  RR: 16 (20 Nov 2023 17:46) (16 - 20)  SpO2: 97% (20 Nov 2023 17:46) (96% - 97%)    O2 Parameters below as of 20 Nov 2023 17:46  Patient On (Oxygen Delivery Method): nasal cannula  O2 Flow (L/min): 3    PHYSICAL EXAM  General: cachectic, speaking in full sentences on 3LNC, appears comfortable    Head: NC/AT; dry MM   Respiratory: fine crackles in the RLL fields, no wheezing   Cardiovascular: sinus tachycardia, R-sided S3   Gastrointestinal: Soft; NTND   Extremities: WWP; + pedal pitting   Neurological: A&Ox3 to person, place, and time       LABS:                          14.5   17.41 )-----------( 273      ( 20 Nov 2023 15:07 )             44.7                                               11-20    133<L>  |  90<L>  |  24<H>  ----------------------------<  217<H>  See Note   |  31  |  0.54    Ca    10.7<H>      20 Nov 2023 15:07        PT/INR - ( 20 Nov 2023 15:07 )   PT: 10.8 sec;   INR: 0.95          PTT - ( 20 Nov 2023 15:07 )  PTT:32.1 sec    Urinalysis Basic - ( 20 Nov 2023 15:07 )    Color: x / Appearance: x / SG: x / pH: x  Gluc: 217 mg/dL / Ketone: x  / Bili: x / Urobili: x   Blood: x / Protein: x / Nitrite: x   Leuk Esterase: x / RBC: x / WBC x   Sq Epi: x / Non Sq Epi: x / Bacteria: x    MEDICATIONS  (STANDING):    MEDICATIONS  (PRN):         =============== ICU Consult ====================    Consult reason: AHRF     HPI:   Patient is a 79 y/o M with pmhx of HTN, HLD, DM, CAD s/p CABG x2 and PCI LCx and LAD, PAD, hx of GIB, and 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 Patient has been following up with Dr. Carter since his diagnosis of pulmonary fibrosis. He was initially on ofev, but was recently started on pirfenidone 8/2023 for his pulmonary fibrosis. Patient was recently treated for a bacterial PNA at [facility] 9/11-9/15 and was discharged on cefpodoxime to complete a course of antibiotics. Patient was seen by Dr. Carter outpatient with complaints of dyspnea at rest, increasing O2 requirements (requiring O2 while at rest, not just when ambulating), increased agitation. Wife at bedside reported that patient's presentation was similar to his previous episode of PNA in September. Given patient's presentation, he was sent to the [facility] ED.     In the [facility] ED, ICU was consulted given concerns for AHRF 2/2 PNA vs. PE vs. worsening pulmonary fibrosis. CT PE was obtained to r/o PE; it showed RML and RLL consolidation concerning for PNA, on a background of pulmonary fibrosis. Patient was started on broad spectrum antibiotics vanc and zosyn, BCx's and an RVP were obtained. Upon examination, patient was lying down in bed comfortably, speaking in full sentences, satting 100% on 3LNC. Patient expressed wishes for DNR/DNI with a trial of NIV.     PAST MEDICAL & SURGICAL HISTORY:  Type 2 diabetes mellitus  Diabetes  Essential hypertension  HTN (hypertension)  CAD (coronary artery disease)  H pylori ulcer  Status post cataract extraction  bilateral  Other postprocedural status  S/P ear surgery  History of coronary artery bypass graft x 2    ED vitals: T 97   -->124 RR 20 /83 SpO2 96% on 6LNC --> 100% on 3L NC  Labs significant for: WBC 17.4, pro-, RVP pending, BCx collected   Imaging/EKG: CTPE with RML and RLL consolidations on a background of pulmonary fibrosis, cannot exclude congestive heart failure.   Interventions: vanc 1g x1, zosyn 3.375g x1 in the ED     Allergies    No Known Allergies    Intolerances    Home Medications:   protonix 40mg qd  asa 81mg qd  jardiance 25mg qd   rosuvastatin 10mg qhs  mirtazapine 15mg qhs  benzonatate 200mg TID PRN cough  veltassa 8.4g 3 days per week (M/W/F)  pirfenidone 267mg 3 tabs TID  bisacodyl 5mg 1 tab PRN constipation  senna 8.6mg 2 tabs PRN constipation    SOCIAL HX:     Smoking          ETOH/Illicit drugs          Occupation    PAST MEDICAL & SURGICAL HISTORY:  Type 2 diabetes mellitus  Diabetes  Essential hypertension  HTN (hypertension)  CAD (coronary artery disease)  H pylori ulcer  Status post cataract extraction  bilateral  Other postprocedural status  S/P ear surgery  History of coronary artery bypass graft x 2    FAMILY HISTORY:  No pertinent family history in first degree relatives      ROS:  See HPI     ICU Vital Signs Last 24 Hrs  T(C): 36.1 (20 Nov 2023 15:06), Max: 36.1 (20 Nov 2023 15:06)  T(F): 97 (20 Nov 2023 15:06), Max: 97 (20 Nov 2023 15:06)  HR: 110 (20 Nov 2023 17:46) (110 - 124)  BP: 130/63 (20 Nov 2023 17:46) (129/83 - 150/75)  BP(mean): --  ABP: --  ABP(mean): --  RR: 16 (20 Nov 2023 17:46) (16 - 20)  SpO2: 97% (20 Nov 2023 17:46) (96% - 97%)    O2 Parameters below as of 20 Nov 2023 17:46  Patient On (Oxygen Delivery Method): nasal cannula  O2 Flow (L/min): 3    PHYSICAL EXAM  General: cachectic, speaking in full sentences on 3LNC, appears comfortable    Head: NC/AT; dry MM   Respiratory: fine crackles in the RLL fields, no wheezing   Cardiovascular: sinus tachycardia, R-sided S3   Gastrointestinal: Soft; NTND   Extremities: WWP; + pedal pitting   Neurological: A&Ox3 to person, place, and time       LABS:                          14.5   17.41 )-----------( 273      ( 20 Nov 2023 15:07 )             44.7                                               11-20    133<L>  |  90<L>  |  24<H>  ----------------------------<  217<H>  See Note   |  31  |  0.54    Ca    10.7<H>      20 Nov 2023 15:07    PT/INR - ( 20 Nov 2023 15:07 )   PT: 10.8 sec;   INR: 0.95       PTT - ( 20 Nov 2023 15:07 )  PTT:32.1 sec    Urinalysis Basic - ( 20 Nov 2023 15:07 )    Color: x / Appearance: x / SG: x / pH: x  Gluc: 217 mg/dL / Ketone: x  / Bili: x / Urobili: x   Blood: x / Protein: x / Nitrite: x   Leuk Esterase: x / RBC: x / WBC x   Sq Epi: x / Non Sq Epi: x / Bacteria: x    MEDICATIONS  (STANDING):    MEDICATIONS  (PRN):

## 2023-11-20 NOTE — H&P ADULT - PROBLEM SELECTOR PLAN 2
The patient had a barrium swallow on 10/24/2023 showing Aspiration with Mild dysmotility. Current PNA could be 2/2 to aspiration as the CT consilidation was found in the right lobes.   Plan:  - S&S consult given aspiration risk  - minced and moist diet for now pending official evaluation

## 2023-11-20 NOTE — H&P ADULT - PROBLEM SELECTOR PLAN 9
F: none, caution given concern for CHF  E: replete as needed  N: minced and moist, pending S&S eval  DVT ppx: lovenox   GI ppx: protonix 40mg qd   Dispo: 7 [facility]  Code Status: DNR/DNI with a trial of NIV Patient on rosuvastatin 10mg qhs.  - c/w atorvastatin 40mg qhs TI for rosuvastatin 10mg qhs

## 2023-11-20 NOTE — ED PROVIDER NOTE - PROGRESS NOTE DETAILS
Alvaro Zavala MD: Patient signed out to me by Dr. Duran pending ICU recommendation. Per ICU resident--admit to Dr. Neely to stepdown.

## 2023-11-20 NOTE — H&P ADULT - PROBLEM SELECTOR PLAN 6
Patient with hx of CABG x2 (LIMA to LAD), as well as PCI to LCx and LAD. Patient on asa 81mg qd.   - c/w asa 81mg qd

## 2023-11-20 NOTE — H&P ADULT - PROBLEM SELECTOR PLAN 10
F: none, caution given concern for CHF  E: replete as needed  N: minced and moist, pending S&S eval  DVT ppx: lovenox   GI ppx: protonix 40mg qd   Dispo: 7 [facility]  Code Status: DNR/DNI with a trial of NIV

## 2023-11-21 NOTE — DIETITIAN INITIAL EVALUATION ADULT - PROBLEM SELECTOR PLAN 1
Likely aspiration PNA vs HAP i/s/o recent PNA treatment and underlying Pulmonary Disease   Patient presented to Dr. Carter's office with complaints of dyspnea at rest, a new cough, and increased agitation as per wife who is at bedside. Patient on home O2 3LNC only while ambulating, however, was now needing more oxygen even at rest.   Wife reporting that patient presentation is similar to prior hx of PNA; recently hospitalized at [facility] 9/11-9/15 and treated for bacterial PNA, discharged on cefpodoxime. PNA could be 2/2 to aspiration, as per wife barium swallow done a few weeks prior to presentation was concerning for aspiration vs. recent travel hx to [place] to visit daughter at [facility] [employer].  On admission, met 2/4 SIRS criteria with 's, WBC 17 as well as tachypneic and with increasing O2 requirements. CTPE was done to rule out PE; no evidence of PE, however CT showed evidence of RML and RLL consolidation on a background of pulmonary fibrosis.   RVP negative  Plan:  - Will start Vancomycin 750mg qd and zosyn 4.5g q8hrs (pseudomonal dosing) given recent antibiotic use and pulmonary fibrosis   - vanc trough prior to 4th dose  - f/u BCx's   - f/u sputum Cx, if able to produce  - f/u urine strep and legionella  - f/u MRSA swab  - c/w O2 via NC, wean as tolerated

## 2023-11-21 NOTE — DIETITIAN INITIAL EVALUATION ADULT - PROBLEM SELECTOR PLAN 4
Patient with pedal edema, S3 on physical exam, CT with concern for possible CHF.   Patient was scheduled for a TTE outpatient by Dr. Alanis, however the TTE was scheduled for 11/21 while patient is at [facility].   - obtain TTE inpatient to assess for CHF and possible pulm HTN given pulmonary fibrosis.   - caution with fluids while awaiting TTE

## 2023-11-21 NOTE — DIETITIAN INITIAL EVALUATION ADULT - PERTINENT MEDS FT
MEDICATIONS  (STANDING):  aspirin enteric coated 81 milliGRAM(s) Oral daily  atorvastatin 40 milliGRAM(s) Oral at bedtime  dextrose 5%. 1000 milliLiter(s) (100 mL/Hr) IV Continuous <Continuous>  dextrose 5%. 1000 milliLiter(s) (50 mL/Hr) IV Continuous <Continuous>  dextrose 50% Injectable 25 Gram(s) IV Push once  dextrose 50% Injectable 25 Gram(s) IV Push once  dextrose 50% Injectable 12.5 Gram(s) IV Push once  enoxaparin Injectable 30 milliGRAM(s) SubCutaneous every 24 hours  glucagon  Injectable 1 milliGRAM(s) IntraMuscular once  insulin lispro (ADMELOG) corrective regimen sliding scale   SubCutaneous Before meals and at bedtime  mirtazapine 15 milliGRAM(s) Oral at bedtime  pantoprazole    Tablet 40 milliGRAM(s) Oral before breakfast  piperacillin/tazobactam IVPB.. 4.5 Gram(s) IV Intermittent every 8 hours  pirfenidone 801 milliGRAM(s) Oral three times a day    MEDICATIONS  (PRN):  bisacodyl 5 milliGRAM(s) Oral at bedtime PRN Constipation  dextrose Oral Gel 15 Gram(s) Oral once PRN Blood Glucose LESS THAN 70 milliGRAM(s)/deciliter  senna 2 Tablet(s) Oral at bedtime PRN Constipation

## 2023-11-21 NOTE — PROGRESS NOTE ADULT - SUBJECTIVE AND OBJECTIVE BOX
PULMONARY CONSULT SERVICE FOLLOW-UP NOTE    INTERVAL HPI:  Reviewed chart and overnight events; patient seen and examined at bedside. Reports improvement in dyspnea    MEDICATIONS:  Pulmonary:    Antimicrobials:  piperacillin/tazobactam IVPB.. 4.5 Gram(s) IV Intermittent every 8 hours    Anticoagulants:  aspirin enteric coated 81 milliGRAM(s) Oral daily  enoxaparin Injectable 30 milliGRAM(s) SubCutaneous every 24 hours    Cardiac:      Allergies    No Known Allergies    Intolerances        Vital Signs Last 24 Hrs  T(C): 36.5 (21 Nov 2023 05:04), Max: 37.7 (21 Nov 2023 01:00)  T(F): 97.7 (21 Nov 2023 05:04), Max: 99.8 (21 Nov 2023 01:00)  HR: 100 (21 Nov 2023 05:04) (100 - 124)  BP: 124/58 (21 Nov 2023 04:58) (124/58 - 162/85)  BP(mean): 84 (21 Nov 2023 04:58) (84 - 91)  RR: 18 (21 Nov 2023 04:58) (16 - 20)  SpO2: 100% (21 Nov 2023 05:04) (91% - 100%)    Parameters below as of 21 Nov 2023 04:58  Patient On (Oxygen Delivery Method): nasal cannula  O2 Flow (L/min): 4      11-20 @ 07:01  -  11-21 @ 07:00  --------------------------------------------------------  IN: 0 mL / OUT: 1000 mL / NET: -1000 mL        PHYSICAL EXAM:  Constitutional: Thin appearing  Head: NC/AT  EENT: No LAD  Neck: -JVD  Respiratory: Crackles+ bilaterally   Cardiovascular: Tachycardic  Gastrointestinal: Firm  Extremities: Trace edema  Vascular: 2+ pulses  Neurological: JOHNSON      LABS:      CBC Full  -  ( 20 Nov 2023 15:07 )  WBC Count : 17.41 K/uL  RBC Count : 4.90 M/uL  Hemoglobin : 14.5 g/dL  Hematocrit : 44.7 %  Platelet Count - Automated : 273 K/uL  Mean Cell Volume : 91.2 fl  Mean Cell Hemoglobin : 29.6 pg  Mean Cell Hemoglobin Concentration : 32.4 gm/dL  Auto Neutrophil # : 15.66 K/uL  Auto Lymphocyte # : 0.56 K/uL  Auto Monocyte # : 1.06 K/uL  Auto Eosinophil # : 0.00 K/uL  Auto Basophil # : 0.05 K/uL  Auto Neutrophil % : 89.9 %  Auto Lymphocyte % : 3.2 %  Auto Monocyte % : 6.1 %  Auto Eosinophil % : 0.0 %  Auto Basophil % : 0.3 %    11-20    134<L>  |  93<L>  |  23  ----------------------------<  174<H>  4.9   |  35<H>  |  0.59    Ca    9.9      20 Nov 2023 18:28  Phos  3.9     11-20  Mg     2.1     11-20    TPro  8.0  /  Alb  3.7  /  TBili  0.4  /  DBili  0.2  /  AST  26  /  ALT  19  /  AlkPhos  66  11-20    PT/INR - ( 20 Nov 2023 15:07 )   PT: 10.8 sec;   INR: 0.95          PTT - ( 20 Nov 2023 15:07 )  PTT:32.1 sec      Urinalysis Basic - ( 20 Nov 2023 18:28 )    Color: x / Appearance: x / SG: x / pH: x  Gluc: 174 mg/dL / Ketone: x  / Bili: x / Urobili: x   Blood: x / Protein: x / Nitrite: x   Leuk Esterase: x / RBC: x / WBC x   Sq Epi: x / Non Sq Epi: x / Bacteria: x                RADIOLOGY & ADDITIONAL STUDIES:

## 2023-11-21 NOTE — DIETITIAN INITIAL EVALUATION ADULT - OTHER INFO
Patient is a 77 y/o M with pmhx of HTN, HLD, DM, CAD s/p CABG x2 and PCI LCx and LAD, PAD, hx of GIB, and 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 Carter outpatient with concerns of AHRF 2/2 PNA vs. PE vs. worsening pulmonary fibrosis, found to have RML and RLL consolidation concerning for PNA, on a background of pulmonary fibrosis, admitted for IV antibiotics with a trial of NIV if needed.     Pt assessed at bedside on 7LA. Rx and labs reviewed. Pt presents with ***. Spoke with wife at bedside to provide some subjective nutrition hx. At home, pt takes a vegetarian diet with some milk and allows eggs; pt currently ordered for vegan diet (only vegetable products). Wife states pt enjoys eggs and takes some milk products primarily with coffee and as oral nutrition supplement. Pt takes Ensure BID daily. Reported usual body weight of 85 pounds; current body weight of 88 pounds. Pt reportedly has been losing wt, but unknown amount over unspecified amount of time. Wife notes pt with variable PO intake and often uses oral nutrition supplement if PO intake inadequate. Pt with difficulty tolerating food noting dysphagia; pt on minced/moist diet and plan for SLP evaluation. Pt on remeron; monitor for appetite increase. No complaints of nausea/vomiting/constipation/diarrhea or difficult chew/swallow. NKA to food. RDN will continue to reassess, intervene, and monitor as appropriate.     Pain: 0 per chart review   GI: Abdomen non-distended/non-tender, +BS x4, last bowel movement PTA   Skin: Warm/Dry/Intact, no edema noted  Patient is a 79 y/o M with pmhx of HTN, HLD, DM, CAD s/p CABG x2 and PCI LCx and LAD, PAD, hx of GIB, and 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 Carter outpatient with concerns of AHRF 2/2 PNA vs. PE vs. worsening pulmonary fibrosis, found to have RML and RLL consolidation concerning for PNA, on a background of pulmonary fibrosis, admitted for IV antibiotics with a trial of NIV if needed.     Pt assessed at bedside on 7LA. Rx and labs reviewed. Pt presents with severe wasting of the temporal, orbital, buccal, clavicular, triceps, and interosseous regions. Spoke with wife at bedside to provide some subjective nutrition hx. At home, pt takes a vegetarian diet with some milk and allows eggs; pt currently ordered for vegan diet (only vegetable products). Wife states pt enjoys eggs and takes some milk products primarily with coffee and as oral nutrition supplement. Pt takes Ensure BID daily. Reported usual body weight of 85 pounds; current body weight of 88 pounds. Pt reportedly has been losing wt, but unknown amount over unspecified amount of time. Wife notes pt with variable PO intake and often uses oral nutrition supplement if PO intake inadequate. Pt with difficulty tolerating food noting dysphagia; pt on minced/moist diet and plan for SLP evaluation. Pt on remeron; monitor for appetite increase. No complaints of nausea/vomiting/constipation/diarrhea or difficult chew/swallow. NKA to food. RDN will continue to reassess, intervene, and monitor as appropriate.     Pain: 0 per chart review   GI: Abdomen non-distended/non-tender, +BS x4, last bowel movement PTA   Skin: Warm/Dry/Intact, no edema noted

## 2023-11-21 NOTE — PROGRESS NOTE ADULT - PROBLEM SELECTOR PLAN 4
#sacral decubitus ulcer, stage 2  Patient lost > 30 lbs in 6 months in setting of chronic pulmonary fibrosis. Also with clean based stage 2 sacral decubitus ulcer on admission.  - Supplement diet with ensure per nutrition  - PT eval  - Encourage OOBTC #sacral decubitus ulcer, stage 2  Patient lost > 30 lbs in 6 months in setting of chronic pulmonary fibrosis. Also with clean based stage 2 sacral decubitus ulcer on admission.  - Supplement diet with ensure per nutrition  - PT eval  - dress ulcer and separate from bed as much as possible  - Encourage OOBTC

## 2023-11-21 NOTE — DIETITIAN INITIAL EVALUATION ADULT - ADD RECOMMEND
-Adjust diet order for more appropriate nutrition therapy    *Recommend lacto-ovo vegetarian diet with Ensure BID with appropriate textures/consistencies per SLP  -Encourage good PO intake and oral nutrition supplement compliance  -Honor food preferences as able   -Monitor chemistry, GI function, and skin integrity  -Adjust diet order for more appropriate nutrition therapy    *Recommend lacto-ovo vegetarian diet with Ensure BID with appropriate textures/consistencies per SLP  -Encourage good PO intake and oral nutrition supplement compliance  -Honor food preferences as able   -Monitor chemistry, GI function, and skin integrity     **Order communicated and pended to 7LA team**

## 2023-11-21 NOTE — PROGRESS NOTE ADULT - PROBLEM SELECTOR PLAN 1
#Sepsis with acute hypoxic respiratory failure  Likely aspiration PNA vs HAP i/s/o recent PNA treatment and underlying Pulmonary Disease   On admission, met 2/4 SIRS criteria with 's, WBC 17 as well as tachypneic and with increasing O2 requirements. CTPE was done to rule out PE; no evidence of PE, however CT showed evidence of RML and RLL consolidation on a background of pulmonary fibrosis.   RVP negative. Urine legionella/step negative. MRSA negative. TTE wnl.    -zosyn 4.5g q8hrs (pseudomonal dosing) given recent antibiotic use and pulmonary fibrosis   - f/u BCx's, sputum Cx  - c/w O2 via NC, wean as tolerated

## 2023-11-21 NOTE — PROGRESS NOTE ADULT - ASSESSMENT
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  CT chest 7/18/2023 personally reviewed and d/w radiology previously:  1. Mild distal esophageal wall thickening, possibly inflammatory related to gastroesophageal reflux given small hiatal hernia, neoplasia cannot be completely excluded. Consider endoscopic correlation.  2. No suspicious adenopathy or evidence of metastatic disease.  3. Unchanged fibrotic interstitial lung disease with basilar predominance.  ?  PFT 5/24/22: FVC 1.35L (42%), FEV1 1.28L (57%), TLC 2.18L (37%), DLCO x / FENO 44 / 264m desat to 84%  PFT 5/24/22: FVC 1.12L (36%), FEV1 1.04L (47%), TLC 2.40L (41%), DLCO x / 312m desat to 83%, titrated to     MRSA (-)  Strep and legionella negative (-)    #Post covid fibrosis  #Pneumonia  #AHRF    Here with increased dyspnea from baseline and worsening cough with and increased oxygen requirements from baseline with new leukocytosis and lobar consolidation. Suspect AHRF in the setting due to bacterial pneumonia. Not on chronic steroids/immunosuppression so low suspicion for PCP pneumonia. Euvolemic on exam, -JVD, trace edema (reportedly improving), no effusions so low suspicion for fluid overload. No new ground glass on CT scan to suggest acute ILD flare.  - Agree with pseudamonal zosyn   - f/u cultures  - no need for pulse dose steroids at this time  - would favor keeping zosyn on given underlying structural lung disease  - PPI  - continue prifendione   78M with PMH of COVID-19 with post covid fibrosis received steroids up-front now on ofev and home oxygen sent in from clinic for tachycardia and hypoxemia with worsening dyspnea from his baseline    Data review:   LFTs wnl 11/2/2023  ?  CT chest 7/18/2023 :  1. Mild distal esophageal wall thickening, possibly inflammatory related to gastroesophageal reflux given small hiatal hernia, neoplasia cannot be completely excluded. Consider endoscopic correlation.  2. No suspicious adenopathy or evidence of metastatic disease.  3. Unchanged fibrotic interstitial lung disease with basilar predominance.  ?  PFT 5/24/22: FVC 1.35L (42%), FEV1 1.28L (57%), TLC 2.18L (37%), DLCO x / FENO 44 / 264m desat to 84%  PFT 5/24/22: FVC 1.12L (36%), FEV1 1.04L (47%), TLC 2.40L (41%), DLCO x / 312m desat to 83%, titrated to     MRSA (-)  Strep and legionella negative (-)    #Post covid fibrosis  #Pneumonia  #AHRF    Here with increased dyspnea from baseline and worsening cough with and increased oxygen requirements from baseline with new leukocytosis and lobar consolidation. Suspect AHRF in the setting due to bacterial pneumonia. Not on chronic steroids/immunosuppression so low suspicion for PCP pneumonia. Euvolemic on exam, -JVD, trace edema (reportedly improving), no effusions so low suspicion for fluid overload. No new ground glass on CT scan to suggest acute ILD flare.  - Agree with pseudomonal zosyn   - f/u cultures  - no need for pulse dose steroids at this time  - would favor keeping zosyn on given underlying structural lung disease  - PPI  - continue prifendione

## 2023-11-21 NOTE — PROGRESS NOTE ADULT - SUBJECTIVE AND OBJECTIVE BOX
SUBJECTIVE/OVERNIGHT EVENTS: No acute overnight events. Pt seen in AM at bedside, resting comfortably in bed, and does not appear to be in any acute distress. When asked, pt denies any recent or active fever, chills, nausea, vomiting, headache, acute sob, chest pain, abdominal pain, genitourinary sx, extremity pain or swelling.    VITAL SIGNS:  Vital Signs Last 24 Hrs  T(C): 36.8 (21 Nov 2023 17:14), Max: 37.7 (21 Nov 2023 01:00)  T(F): 98.2 (21 Nov 2023 17:14), Max: 99.8 (21 Nov 2023 01:00)  HR: 96 (21 Nov 2023 17:00) (96 - 112)  BP: 110/55 (21 Nov 2023 17:00) (110/55 - 162/85)  BP(mean): 77 (21 Nov 2023 17:00) (77 - 91)  RR: 16 (21 Nov 2023 17:00) (16 - 18)  SpO2: 98% (21 Nov 2023 17:00) (91% - 100%)    Parameters below as of 21 Nov 2023 17:00  Patient On (Oxygen Delivery Method): nasal cannula  O2 Flow (L/min): 4      PHYSICAL EXAM:  General: cachectic older gentleman, NAD, laying in bed, speaking in full sentences  HEENT: head NC/AT, no conjunctival injection, EOMI, MMM  Neck: supple, no JVD  Cardio: RRR, +S1/S2, S3 heard, no M/R, pulsitile skin over L 4th intercostal space  Resp: coarse crackles appreciated in right lower lobe, other lung fields CTA, no crackles or wheezing.   Abdo: soft, NT, ND, +bowel sounds x4, no organomegaly or palpable mass    Extremities: WWP, no edema/cyanosis/clubbing   Vasc: 2+ radial and DP pulses b/l  Neuro: A&Ox3  Psych: speech non-pressured, thoughts goal-oriented  Skin: Stage 2 sacral decubitus ulcer, no visible jaundice   MSK: no joint swelling    MEDICATIONS:  MEDICATIONS  (STANDING):  ascorbic acid 250 milliGRAM(s) Oral daily  aspirin enteric coated 81 milliGRAM(s) Oral daily  atorvastatin 40 milliGRAM(s) Oral at bedtime  dextrose 5%. 1000 milliLiter(s) (100 mL/Hr) IV Continuous <Continuous>  dextrose 5%. 1000 milliLiter(s) (50 mL/Hr) IV Continuous <Continuous>  dextrose 50% Injectable 25 Gram(s) IV Push once  dextrose 50% Injectable 25 Gram(s) IV Push once  dextrose 50% Injectable 12.5 Gram(s) IV Push once  enoxaparin Injectable 30 milliGRAM(s) SubCutaneous every 24 hours  glucagon  Injectable 1 milliGRAM(s) IntraMuscular once  insulin lispro (ADMELOG) corrective regimen sliding scale   SubCutaneous Before meals and at bedtime  mirtazapine 15 milliGRAM(s) Oral at bedtime  multivitamin 1 Tablet(s) Oral daily  pantoprazole    Tablet 40 milliGRAM(s) Oral before breakfast  piperacillin/tazobactam IVPB.. 4.5 Gram(s) IV Intermittent every 8 hours  pirfenidone 801 milliGRAM(s) Oral three times a day  zinc sulfate 220 milliGRAM(s) Oral daily    MEDICATIONS  (PRN):  bisacodyl 5 milliGRAM(s) Oral at bedtime PRN Constipation  dextrose Oral Gel 15 Gram(s) Oral once PRN Blood Glucose LESS THAN 70 milliGRAM(s)/deciliter  senna 2 Tablet(s) Oral at bedtime PRN Constipation      ALLERGIES:  Allergies    No Known Allergies    Intolerances        LABS:                        12.4   14.12 )-----------( 212      ( 21 Nov 2023 05:30 )             39.8     11-21    135  |  96  |  22  ----------------------------<  112<H>  5.0   |  24  |  0.57    Ca    9.7      21 Nov 2023 05:30  Phos  3.3     11-21  Mg     1.9     11-21    TPro  7.4  /  Alb  2.8<L>  /  TBili  0.5  /  DBili  x   /  AST  19  /  ALT  14  /  AlkPhos  67  11-21    PT/INR - ( 20 Nov 2023 15:07 )   PT: 10.8 sec;   INR: 0.95          PTT - ( 20 Nov 2023 15:07 )  PTT:32.1 sec    RADIOLOGY & ADDITIONAL TESTS: Reviewed.

## 2023-11-21 NOTE — PROGRESS NOTE ADULT - PROBLEM SELECTOR PLAN 9
F: none, caution given concern for CHF  E: replete as needed  N: minced and moist, pending S&S eval  DVT ppx: lovenox   GI ppx: protonix 40mg qd   Dispo: pending PT  Code Status: DNR/DNI with a trial of NIV

## 2023-11-21 NOTE — PROGRESS NOTE ADULT - PROBLEM SELECTOR PLAN 3
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 Carter outpatient, currently on pirfenidone 267mg 3 tablets TID.  - pirfenidone 267mg 3 tablets TID  - protonix 40mg qd GI ppx while on pirfenidone   - pulm following, adiel kang

## 2023-11-21 NOTE — DIETITIAN INITIAL EVALUATION ADULT - PROBLEM SELECTOR PLAN 8
Patient no longer on any meds for HTN. BP on admission 129/83-->150/75. Patient was on amlodipine, however states that he has not taken it due to development of edema in LE.   Plan:  - monitor BP

## 2023-11-21 NOTE — DIETITIAN INITIAL EVALUATION ADULT - PERTINENT LABORATORY DATA
11-21    135  |  96  |  22  ----------------------------<  112<H>  5.0   |  24  |  0.57    Ca    9.7      21 Nov 2023 05:30  Phos  3.3     11-21  Mg     1.9     11-21    TPro  7.4  /  Alb  2.8<L>  /  TBili  0.5  /  DBili  x   /  AST  19  /  ALT  14  /  AlkPhos  67  11-21  POCT Blood Glucose.: 84 mg/dL (11-21-23 @ 06:03)  A1C with Estimated Average Glucose Result: 7.7 % (11-20-23 @ 15:07)

## 2023-11-21 NOTE — DIETITIAN INITIAL EVALUATION ADULT - OTHER CALCULATIONS
Estimated nutritional needs determined using [facility] Standards of Nutrition Care for elderly repletion/ protein-calorie malnutrition using current body weight 40 kg (65%IBW).

## 2023-11-21 NOTE — DIETITIAN INITIAL EVALUATION ADULT - PROBLEM SELECTOR PLAN 7
The patient has a Hx of Diabetes Mellitus. On outpatient Jardiance 25mg. A1c on this admission is 7.7  Plan:  - Monitor glucose   - c/w mISS

## 2023-11-22 NOTE — PHYSICAL THERAPY INITIAL EVALUATION ADULT - GAIT DEVIATIONS NOTED, PT EVAL
decreased russell/increased time in double stance/decreased step length/decreased weight-shifting ability

## 2023-11-22 NOTE — PROGRESS NOTE ADULT - SUBJECTIVE AND OBJECTIVE BOX
###INCOMPLETE###    O/N Events:    Subjective/ROS: Patient seen and examined at bedside. Denies fevers, chills, HA, CP, SOB, n/v, changes in bowel/urinary habits.  12pt ROS otherwise negative.    VITALS  Vital Signs Last 24 Hrs  T(C): 36.4 (22 Nov 2023 14:00), Max: 36.8 (21 Nov 2023 17:14)  T(F): 97.5 (22 Nov 2023 14:00), Max: 98.2 (21 Nov 2023 17:14)  HR: 92 (22 Nov 2023 12:00) (82 - 96)  BP: 133/65 (22 Nov 2023 12:00) (96/52 - 133/65)  BP(mean): 92 (22 Nov 2023 12:00) (70 - 92)  RR: 16 (22 Nov 2023 12:00) (16 - 18)  SpO2: 94% (22 Nov 2023 12:00) (94% - 100%)    Parameters below as of 22 Nov 2023 12:00  Patient On (Oxygen Delivery Method): nasal cannula  O2 Flow (L/min): 2      CAPILLARY BLOOD GLUCOSE      POCT Blood Glucose.: 127 mg/dL (22 Nov 2023 11:03)  POCT Blood Glucose.: 96 mg/dL (22 Nov 2023 05:57)  POCT Blood Glucose.: 134 mg/dL (21 Nov 2023 22:07)  POCT Blood Glucose.: 108 mg/dL (21 Nov 2023 16:54)      PHYSICAL EXAM  General: NAD  Head: NC/AT; MMM; PERRL; EOMI;  Neck: Supple; no JVD  Respiratory: CTAB; no wheezes/rales/rhonchi  Cardiovascular: Regular rhythm/rate; S1/S2+, no murmurs, rubs gallops   Gastrointestinal: Soft; NTND; bowel sounds normal and present  Extremities: WWP; no edema/cyanosis  Neurological: A&Ox3, CNII-XII grossly intact; no obvious focal deficits    MEDICATIONS  (STANDING):  ascorbic acid 250 milliGRAM(s) Oral daily  aspirin enteric coated 81 milliGRAM(s) Oral daily  atorvastatin 40 milliGRAM(s) Oral at bedtime  dextrose 5%. 1000 milliLiter(s) (100 mL/Hr) IV Continuous <Continuous>  dextrose 5%. 1000 milliLiter(s) (50 mL/Hr) IV Continuous <Continuous>  dextrose 50% Injectable 25 Gram(s) IV Push once  dextrose 50% Injectable 12.5 Gram(s) IV Push once  dextrose 50% Injectable 25 Gram(s) IV Push once  enoxaparin Injectable 30 milliGRAM(s) SubCutaneous every 24 hours  gabapentin 100 milliGRAM(s) Oral every 8 hours  glucagon  Injectable 1 milliGRAM(s) IntraMuscular once  insulin lispro (ADMELOG) corrective regimen sliding scale   SubCutaneous Before meals and at bedtime  mirtazapine 15 milliGRAM(s) Oral at bedtime  multivitamin 1 Tablet(s) Oral daily  pantoprazole    Tablet 40 milliGRAM(s) Oral before breakfast  piperacillin/tazobactam IVPB.. 4.5 Gram(s) IV Intermittent every 8 hours  pirfenidone 801 milliGRAM(s) Oral three times a day  zinc sulfate 220 milliGRAM(s) Oral daily    MEDICATIONS  (PRN):  bisacodyl 5 milliGRAM(s) Oral at bedtime PRN Constipation  dextrose Oral Gel 15 Gram(s) Oral once PRN Blood Glucose LESS THAN 70 milliGRAM(s)/deciliter  senna 2 Tablet(s) Oral at bedtime PRN Constipation      No Known Allergies      LABS                        12.4   10.56 )-----------( 240      ( 22 Nov 2023 05:30 )             38.9     11-22    133<L>  |  97  |  20  ----------------------------<  91  4.6   |  31  |  0.58    Ca    9.4      22 Nov 2023 05:30  Phos  2.9     11-22  Mg     1.8     11-22    TPro  7.4  /  Alb  2.8<L>  /  TBili  0.5  /  DBili  x   /  AST  19  /  ALT  14  /  AlkPhos  67  11-21      Urinalysis Basic - ( 22 Nov 2023 05:30 )    Color: x / Appearance: x / SG: x / pH: x  Gluc: 91 mg/dL / Ketone: x  / Bili: x / Urobili: x   Blood: x / Protein: x / Nitrite: x   Leuk Esterase: x / RBC: x / WBC x   Sq Epi: x / Non Sq Epi: x / Bacteria: x              IMAGING/EKG/ETC   ***Transfer from medicine telemetry to medicine floors***    79 y/o M with PMH  HTN, HLD, DM, CAD s/p CABG @ [facility] 2015 ( [place] to LAD, known to be occluded by Cath @ [facility] in 2016 s/p [place] to pLAD), PAD, hx of GIB, and 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 Carter outpatient with concerns of AHRF 2/2 PNA vs. PE vs. worsening pulmonary fibrosis, found to have RML and RLL consolidation concerning for PNA, on a background of pulmonary fibrosis, admitted for IV antibiotics with a trial of NIV if needed. Started on Zosyn PsA dosing. restarted on home pirfenidone for pulmonary fibrosis. TTe wnl.  SLP eval: Seemingly functional oropharyngeal swallow. No jennifer clinical indicators of penetration/aspiration and pharyngeal inefficiency noted. Given concern for aspiration imaged on esophagram and current pulmonary status, recommend a modified barium swallow study (MBSS) to further assess swallow function. Patient started on gabapentin for neuropathic foot pain. Patient is ready for transfer to floors.    Subjective/ROS: Patient seen and examined at bedside. Pt says he feels okay and does not endorse any discomfort. Denies fevers, chills, HA, CP, SOB, n/v, changes in bowel/urinary habits.  12pt ROS otherwise negative.    VITALS  Vital Signs Last 24 Hrs  T(C): 36.4 (22 Nov 2023 14:00), Max: 36.8 (21 Nov 2023 17:14)  T(F): 97.5 (22 Nov 2023 14:00), Max: 98.2 (21 Nov 2023 17:14)  HR: 92 (22 Nov 2023 12:00) (82 - 96)  BP: 133/65 (22 Nov 2023 12:00) (96/52 - 133/65)  BP(mean): 92 (22 Nov 2023 12:00) (70 - 92)  RR: 16 (22 Nov 2023 12:00) (16 - 18)  SpO2: 94% (22 Nov 2023 12:00) (94% - 100%)    Parameters below as of 22 Nov 2023 12:00  Patient On (Oxygen Delivery Method): nasal cannula  O2 Flow (L/min): 2      CAPILLARY BLOOD GLUCOSE      POCT Blood Glucose.: 127 mg/dL (22 Nov 2023 11:03)  POCT Blood Glucose.: 96 mg/dL (22 Nov 2023 05:57)  POCT Blood Glucose.: 134 mg/dL (21 Nov 2023 22:07)  POCT Blood Glucose.: 108 mg/dL (21 Nov 2023 16:54)      PHYSICAL EXAM  General: NAD, lying in bed comfortably, thin  Head: NC/AT; MMM; PERRL; EOMI;  Neck: Supple; no JVD  Respiratory: CTAB; no wheezes/rales/rhonchi, on 2L NC oxygenating well  Cardiovascular: Regular rhythm/rate; S1/S2+, no murmurs, rubs gallops   Gastrointestinal: Soft; NTND; bowel sounds normal and present  Extremities: WWP; no edema/cyanosis  Neurological: A&Ox4, conversing well    MEDICATIONS  (STANDING):  ascorbic acid 250 milliGRAM(s) Oral daily  aspirin enteric coated 81 milliGRAM(s) Oral daily  atorvastatin 40 milliGRAM(s) Oral at bedtime  dextrose 5%. 1000 milliLiter(s) (100 mL/Hr) IV Continuous <Continuous>  dextrose 5%. 1000 milliLiter(s) (50 mL/Hr) IV Continuous <Continuous>  dextrose 50% Injectable 25 Gram(s) IV Push once  dextrose 50% Injectable 12.5 Gram(s) IV Push once  dextrose 50% Injectable 25 Gram(s) IV Push once  enoxaparin Injectable 30 milliGRAM(s) SubCutaneous every 24 hours  gabapentin 100 milliGRAM(s) Oral every 8 hours  glucagon  Injectable 1 milliGRAM(s) IntraMuscular once  insulin lispro (ADMELOG) corrective regimen sliding scale   SubCutaneous Before meals and at bedtime  mirtazapine 15 milliGRAM(s) Oral at bedtime  multivitamin 1 Tablet(s) Oral daily  pantoprazole    Tablet 40 milliGRAM(s) Oral before breakfast  piperacillin/tazobactam IVPB.. 4.5 Gram(s) IV Intermittent every 8 hours  pirfenidone 801 milliGRAM(s) Oral three times a day  zinc sulfate 220 milliGRAM(s) Oral daily    MEDICATIONS  (PRN):  bisacodyl 5 milliGRAM(s) Oral at bedtime PRN Constipation  dextrose Oral Gel 15 Gram(s) Oral once PRN Blood Glucose LESS THAN 70 milliGRAM(s)/deciliter  senna 2 Tablet(s) Oral at bedtime PRN Constipation      No Known Allergies      LABS                        12.4   10.56 )-----------( 240      ( 22 Nov 2023 05:30 )             38.9     11-22    133<L>  |  97  |  20  ----------------------------<  91  4.6   |  31  |  0.58    Ca    9.4      22 Nov 2023 05:30  Phos  2.9     11-22  Mg     1.8     11-22    TPro  7.4  /  Alb  2.8<L>  /  TBili  0.5  /  DBili  x   /  AST  19  /  ALT  14  /  AlkPhos  67  11-21      Urinalysis Basic - ( 22 Nov 2023 05:30 )    Color: x / Appearance: x / SG: x / pH: x  Gluc: 91 mg/dL / Ketone: x  / Bili: x / Urobili: x   Blood: x / Protein: x / Nitrite: x   Leuk Esterase: x / RBC: x / WBC x   Sq Epi: x / Non Sq Epi: x / Bacteria: x              IMAGING/EKG/ETC

## 2023-11-22 NOTE — SWALLOW BEDSIDE ASSESSMENT ADULT - SLP GENERAL OBSERVATIONS
Patient was seen fully awake and alert, HOB fully elevated, on 4L of O2 via NC. A&Ox3, followed simple directives, and communicated wants/needs. No dysarthria noted in conversation.

## 2023-11-22 NOTE — DIETITIAN NUTRITION RISK NOTIFICATION - ADDITIONAL COMMENTS/DIETITIAN RECOMMENDATIONS
-Adjust diet order for more appropriate nutrition therapy    *Recommend lacto-ovo vegetarian diet with Ensure BID with appropriate textures/consistencies per SLP  -Encourage good PO intake and oral nutrition supplement compliance  -Honor food preferences as able   -Monitor chemistry, GI function, and skin integrity

## 2023-11-22 NOTE — SWALLOW BEDSIDE ASSESSMENT ADULT - SLP PERTINENT HISTORY OF CURRENT PROBLEM
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 Carter outpatient with concerns of AHRF 2/2 PNA vs. PE vs. worsening pulmonary fibrosis, found to have RML and RLL consolidation concerning for PNA, on a background of pulmonary fibrosis, admitted to [facility] on 11/20/23 for IV antibiotics.

## 2023-11-22 NOTE — PROGRESS NOTE ADULT - SUBJECTIVE AND OBJECTIVE BOX
PULMONARY CONSULT SERVICE FOLLOW-UP NOTE    INTERVAL HPI:  Reviewed chart and overnight events; patient seen and examined.     MEDICATIONS:  Pulmonary:    Antimicrobials:  piperacillin/tazobactam IVPB.. 4.5 Gram(s) IV Intermittent every 8 hours    Anticoagulants:  aspirin enteric coated 81 milliGRAM(s) Oral daily  enoxaparin Injectable 30 milliGRAM(s) SubCutaneous every 24 hours    Cardiac:      Allergies    No Known Allergies    Intolerances        Vital Signs Last 24 Hrs  T(C): 36.3 (22 Nov 2023 10:35), Max: 36.8 (21 Nov 2023 17:14)  T(F): 97.4 (22 Nov 2023 10:35), Max: 98.2 (21 Nov 2023 17:14)  HR: 92 (22 Nov 2023 12:00) (82 - 96)  BP: 133/65 (22 Nov 2023 12:00) (96/52 - 133/65)  BP(mean): 92 (22 Nov 2023 12:00) (70 - 92)  RR: 16 (22 Nov 2023 12:00) (16 - 18)  SpO2: 94% (22 Nov 2023 12:00) (94% - 100%)    Parameters below as of 22 Nov 2023 12:00  Patient On (Oxygen Delivery Method): nasal cannula  O2 Flow (L/min): 2      11-21 @ 07:01  -  11-22 @ 07:00  --------------------------------------------------------  IN: 675 mL / OUT: 300 mL / NET: 375 mL    11-22 @ 07:01  -  11-22 @ 13:24  --------------------------------------------------------  IN: 75 mL / OUT: 550 mL / NET: -475 mL          PHYSICAL EXAM:  Constitutional: well-appearing, in no apparent respiratory distress  HEENT: NC/AT; PERRL, anicteric sclera; moist mucous membranes  Neck: supple, no JVD or LAD appreciated  Cardiovascular: +S1/S2, Regular rate and rhythm, no murmurs appreciated   Respiratory: Clear breath sounds bilaterally. No wheezes, rhonchi or rales   Gastrointestinal: soft, non-tender, non-distended. Normoactive bowel sounds.   Extremities: no edema, clubbing or cyanosis  Vascular: 2+ radial pulses B/L  Neurological: awake and alert; oriented x3    LABS:      CBC Full  -  ( 22 Nov 2023 05:30 )  WBC Count : 10.56 K/uL  RBC Count : 4.20 M/uL  Hemoglobin : 12.4 g/dL  Hematocrit : 38.9 %  Platelet Count - Automated : 240 K/uL  Mean Cell Volume : 92.6 fl  Mean Cell Hemoglobin : 29.5 pg  Mean Cell Hemoglobin Concentration : 31.9 gm/dL  Auto Neutrophil # : 8.31 K/uL  Auto Lymphocyte # : 1.12 K/uL  Auto Monocyte # : 0.74 K/uL  Auto Eosinophil # : 0.29 K/uL  Auto Basophil # : 0.07 K/uL  Auto Neutrophil % : 78.7 %  Auto Lymphocyte % : 10.6 %  Auto Monocyte % : 7.0 %  Auto Eosinophil % : 2.7 %  Auto Basophil % : 0.7 %    11-22    133<L>  |  97  |  20  ----------------------------<  91  4.6   |  31  |  0.58    Ca    9.4      22 Nov 2023 05:30  Phos  2.9     11-22  Mg     1.8     11-22    TPro  7.4  /  Alb  2.8<L>  /  TBili  0.5  /  DBili  x   /  AST  19  /  ALT  14  /  AlkPhos  67  11-21    PT/INR - ( 20 Nov 2023 15:07 )   PT: 10.8 sec;   INR: 0.95          PTT - ( 20 Nov 2023 15:07 )  PTT:32.1 sec                RADIOLOGY & ADDITIONAL STUDIES: PULMONARY CONSULT SERVICE FOLLOW-UP NOTE    INTERVAL HPI:  Reviewed chart and overnight events; patient seen and examined. Reports continued improvement in dyspnea    MEDICATIONS:  Pulmonary:    Antimicrobials:  piperacillin/tazobactam IVPB.. 4.5 Gram(s) IV Intermittent every 8 hours    Anticoagulants:  aspirin enteric coated 81 milliGRAM(s) Oral daily  enoxaparin Injectable 30 milliGRAM(s) SubCutaneous every 24 hours    Cardiac:      Allergies    No Known Allergies    Intolerances        Vital Signs Last 24 Hrs  T(C): 36.3 (22 Nov 2023 10:35), Max: 36.8 (21 Nov 2023 17:14)  T(F): 97.4 (22 Nov 2023 10:35), Max: 98.2 (21 Nov 2023 17:14)  HR: 92 (22 Nov 2023 12:00) (82 - 96)  BP: 133/65 (22 Nov 2023 12:00) (96/52 - 133/65)  BP(mean): 92 (22 Nov 2023 12:00) (70 - 92)  RR: 16 (22 Nov 2023 12:00) (16 - 18)  SpO2: 94% (22 Nov 2023 12:00) (94% - 100%)    Parameters below as of 22 Nov 2023 12:00  Patient On (Oxygen Delivery Method): nasal cannula  O2 Flow (L/min): 2      11-21 @ 07:01  -  11-22 @ 07:00  --------------------------------------------------------  IN: 675 mL / OUT: 300 mL / NET: 375 mL    11-22 @ 07:01  -  11-22 @ 13:24  --------------------------------------------------------  IN: 75 mL / OUT: 550 mL / NET: -475 mL          PHYSICAL EXAM:  Constitutional: Thin, cachectic   HEENT: MMM  Neck: -JVD  Cardiovascular: +S1/S2  Respiratory: Crackles R> L   Gastrointestinal: soft   Extremities: no edema  Vascular: 2+ radial pulses B/L  Neurological: awake and alert; oriented x3    LABS:      CBC Full  -  ( 22 Nov 2023 05:30 )  WBC Count : 10.56 K/uL  RBC Count : 4.20 M/uL  Hemoglobin : 12.4 g/dL  Hematocrit : 38.9 %  Platelet Count - Automated : 240 K/uL  Mean Cell Volume : 92.6 fl  Mean Cell Hemoglobin : 29.5 pg  Mean Cell Hemoglobin Concentration : 31.9 gm/dL  Auto Neutrophil # : 8.31 K/uL  Auto Lymphocyte # : 1.12 K/uL  Auto Monocyte # : 0.74 K/uL  Auto Eosinophil # : 0.29 K/uL  Auto Basophil # : 0.07 K/uL  Auto Neutrophil % : 78.7 %  Auto Lymphocyte % : 10.6 %  Auto Monocyte % : 7.0 %  Auto Eosinophil % : 2.7 %  Auto Basophil % : 0.7 %    11-22    133<L>  |  97  |  20  ----------------------------<  91  4.6   |  31  |  0.58    Ca    9.4      22 Nov 2023 05:30  Phos  2.9     11-22  Mg     1.8     11-22    TPro  7.4  /  Alb  2.8<L>  /  TBili  0.5  /  DBili  x   /  AST  19  /  ALT  14  /  AlkPhos  67  11-21    PT/INR - ( 20 Nov 2023 15:07 )   PT: 10.8 sec;   INR: 0.95          PTT - ( 20 Nov 2023 15:07 )  PTT:32.1 sec                RADIOLOGY & ADDITIONAL STUDIES:

## 2023-11-22 NOTE — SWALLOW BEDSIDE ASSESSMENT ADULT - SWALLOW EVAL: RECOMMENDED DIET
Regular consistency and Thin liquids (vegetarian) Continue minced/moist and thin liquids (vegetarian), diet advancement pending MBSS

## 2023-11-22 NOTE — DIETITIAN NUTRITION RISK NOTIFICATION - TREATMENT: THE FOLLOWING DIET HAS BEEN RECOMMENDED
Diet, Minced and Moist:   Lacto-Ovo Veg (Accepts Milk Prod., Eggs)    Start Time: 22:00  Supplement Feeding Modality:  Oral  Ensure Max Cans or Servings Per Day:  1       Frequency:  Three Times a day (11-22-23 @ 10:24) [Active]  Diet, Minced and Moist:   Lacto-Ovo Veg (Accepts Milk Prod., Eggs)  Supplement Feeding Modality:  Oral  Ensure Enlive Cans or Servings Per Day:  1       Frequency:  Two Times a day (11-21-23 @ 15:46) [Pending Verification By Attending]

## 2023-11-22 NOTE — PHYSICAL THERAPY INITIAL EVALUATION ADULT - MODALITIES TREATMENT COMMENTS
Pt., p/w limited endurance, desaturation  to SpO2=86-88% with ambulation on 2L/min and improved ambulatory SpO2=90-92% on 3L/min.

## 2023-11-22 NOTE — PHYSICAL THERAPY INITIAL EVALUATION ADULT - PERTINENT HX OF CURRENT PROBLEM, REHAB EVAL
Pt. is a 78 y.o male with h/o pulmonary fibrosis s/p COVID PNA in 2020 currently p/w AHRF secondary RML/RLL PNA. Admitted for further management.

## 2023-11-22 NOTE — PROGRESS NOTE ADULT - ASSESSMENT
Patient is a 77 y/o M with PMHx of HTN, HLD, DM, CAD s/p CABG x2 and PCI LCx and LAD, PAD, hx of GIB and pulmonary fibrosis 2/2 COVID in 2020 (2-3L at home on ambulation) admitted for  AHRF 2/2 PNA admitted for IV antibiotics with clinical improvement.

## 2023-11-22 NOTE — PROGRESS NOTE ADULT - PROBLEM SELECTOR PLAN 3
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 Carter outpatient, currently on pirfenidone.     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 Carter outpatient, currently on pirfenidone.     Plan:   - Pirfenidone 801 mg tablets TID  - Protonix 40mg qd GI ppx while on pirfenidone   - pulm following, appreciate recs Patient with pulmonary fibrosis 2/2 COVID in 2020, follows up with Dr. Carter outpatient, currently on pirfenidone. Per Pulm, does not suspect worsening of ILD.    Plan:   - Pirfenidone 801 mg tablets TID  - Protonix 40mg qd GI ppx while on pirfenidone   - pulm following, appreciate recs

## 2023-11-22 NOTE — PROGRESS NOTE ADULT - PROBLEM SELECTOR PLAN 4
#sacral decubitus ulcer, stage 2  Patient lost > 30 lbs in 6 months in setting of chronic pulmonary fibrosis. Also with clean based stage 2 sacral decubitus ulcer on admission.    Plan:   - Supplement diet with ensure per nutrition  - PT eval (ordered)   - dress ulcer and separate from bed as much as possible  - Encourage OOBTC #sacral decubitus ulcer, stage 2  Patient lost > 30 lbs in 6 months in setting of chronic pulmonary fibrosis. Also with clean based stage 2 sacral decubitus ulcer on admission.  PT recommends outpatient PT    Plan:   - Supplement diet with ensure per nutrition  - dress ulcer and separate from bed as much as possible  - Encourage OOBTC

## 2023-11-22 NOTE — PHYSICAL THERAPY INITIAL EVALUATION ADULT - PREDICTED DURATION OF THERAPY (DAYS/WKS), PT EVAL
Pt. would benefit from continued PT f/u to improve endurance and functional mobility/prevent further deconditioning.

## 2023-11-22 NOTE — PROGRESS NOTE ADULT - SUBJECTIVE AND OBJECTIVE BOX
***Transfer from medicine telemetry to medicine floors***    SUBJECTIVE/OVERNIGHT EVENTS: No acute overnight events. Pt seen in AM at bedside, resting comfortably in bed, and does not appear to be in any acute distress. Reports continuation of his neuropathic bilateral foot pain.  When asked, pt denies any recent or active fever, chills, nausea, vomiting, headache, acute sob, chest pain, abdominal pain, genitourinary sx, swelling.    VITAL SIGNS:  Vital Signs Last 24 Hrs  T(C): 36.3 (22 Nov 2023 10:35), Max: 36.8 (21 Nov 2023 17:14)  T(F): 97.4 (22 Nov 2023 10:35), Max: 98.2 (21 Nov 2023 17:14)  HR: 92 (22 Nov 2023 12:00) (82 - 96)  BP: 133/65 (22 Nov 2023 12:00) (96/52 - 133/65)  BP(mean): 92 (22 Nov 2023 12:00) (70 - 92)  RR: 16 (22 Nov 2023 12:00) (16 - 18)  SpO2: 94% (22 Nov 2023 12:00) (94% - 100%)    Parameters below as of 22 Nov 2023 12:00  Patient On (Oxygen Delivery Method): nasal cannula  O2 Flow (L/min): 2      PHYSICAL EXAM:  General: NAD; speaking in full sentences  HEENT: NC/AT; PERRL; EOMI; MMM  Neck: supple; no JVD  Cardiac: RRR; +S1/S2  Pulm: CTA B/L; no W/R/R  GI: soft, NT/ND, +BS  Extremities: WWP; no edema, clubbing or cyanosis  Vasc: 2+ radial, DP pulses B/L  Neuro: AAOx3; no focal deficits    MEDICATIONS:  MEDICATIONS  (STANDING):  ascorbic acid 250 milliGRAM(s) Oral daily  aspirin enteric coated 81 milliGRAM(s) Oral daily  atorvastatin 40 milliGRAM(s) Oral at bedtime  dextrose 5%. 1000 milliLiter(s) (100 mL/Hr) IV Continuous <Continuous>  dextrose 5%. 1000 milliLiter(s) (50 mL/Hr) IV Continuous <Continuous>  dextrose 50% Injectable 25 Gram(s) IV Push once  dextrose 50% Injectable 25 Gram(s) IV Push once  dextrose 50% Injectable 12.5 Gram(s) IV Push once  enoxaparin Injectable 30 milliGRAM(s) SubCutaneous every 24 hours  gabapentin 100 milliGRAM(s) Oral every 8 hours  glucagon  Injectable 1 milliGRAM(s) IntraMuscular once  insulin lispro (ADMELOG) corrective regimen sliding scale   SubCutaneous Before meals and at bedtime  mirtazapine 15 milliGRAM(s) Oral at bedtime  multivitamin 1 Tablet(s) Oral daily  pantoprazole    Tablet 40 milliGRAM(s) Oral before breakfast  piperacillin/tazobactam IVPB.. 4.5 Gram(s) IV Intermittent every 8 hours  pirfenidone 801 milliGRAM(s) Oral three times a day  zinc sulfate 220 milliGRAM(s) Oral daily    MEDICATIONS  (PRN):  bisacodyl 5 milliGRAM(s) Oral at bedtime PRN Constipation  dextrose Oral Gel 15 Gram(s) Oral once PRN Blood Glucose LESS THAN 70 milliGRAM(s)/deciliter  senna 2 Tablet(s) Oral at bedtime PRN Constipation      ALLERGIES:  Allergies    No Known Allergies    Intolerances        LABS:                        12.4   10.56 )-----------( 240      ( 22 Nov 2023 05:30 )             38.9     11-22    133<L>  |  97  |  20  ----------------------------<  91  4.6   |  31  |  0.58    Ca    9.4      22 Nov 2023 05:30  Phos  2.9     11-22  Mg     1.8     11-22    TPro  7.4  /  Alb  2.8<L>  /  TBili  0.5  /  DBili  x   /  AST  19  /  ALT  14  /  AlkPhos  67  11-21    PT/INR - ( 20 Nov 2023 15:07 )   PT: 10.8 sec;   INR: 0.95          PTT - ( 20 Nov 2023 15:07 )  PTT:32.1 sec    RADIOLOGY & ADDITIONAL TESTS: Reviewed. 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 Carter outpatient with concerns of AHRF 2/2 PNA vs. PE vs. worsening pulmonary fibrosis, found to have RML and RLL consolidation concerning for PNA, on a background of pulmonary fibrosis, admitted for IV antibiotics with a trial of NIV if needed. Started on Zosyn PsA dosing. restarted on home pirfenidone for pulmonary fibrosis.TTe wnl.  SLP eval: Seemingly functional oropharyngeal swallow. No jennifer clinical indicators of penetration/aspiration and pharyngeal inefficiency noted. Given concern for aspiration imaged on esophagram and current pulmonary status, recommend a modified barium swallow study (MBSS) to further assess swallow function. Patient started on gabapentin for neuropathic foot pain. Patient is ready for transfer to floors.      SUBJECTIVE/OVERNIGHT EVENTS: No acute overnight events. Pt seen in AM at bedside, resting comfortably in bed, and does not appear to be in any acute distress. Reports continuation of his neuropathic bilateral foot pain.  When asked, pt denies any recent or active fever, chills, nausea, vomiting, headache, acute sob, chest pain, abdominal pain, genitourinary sx, swelling.    VITAL SIGNS:  Vital Signs Last 24 Hrs  T(C): 36.3 (22 Nov 2023 10:35), Max: 36.8 (21 Nov 2023 17:14)  T(F): 97.4 (22 Nov 2023 10:35), Max: 98.2 (21 Nov 2023 17:14)  HR: 92 (22 Nov 2023 12:00) (82 - 96)  BP: 133/65 (22 Nov 2023 12:00) (96/52 - 133/65)  BP(mean): 92 (22 Nov 2023 12:00) (70 - 92)  RR: 16 (22 Nov 2023 12:00) (16 - 18)  SpO2: 94% (22 Nov 2023 12:00) (94% - 100%)    Parameters below as of 22 Nov 2023 12:00  Patient On (Oxygen Delivery Method): nasal cannula  O2 Flow (L/min): 2      PHYSICAL EXAM:  General: cachectic older gentleman, NAD, laying in bed, speaking in full sentences  HEENT: head NC/AT, no conjunctival injection, EOMI, MMM  Neck: supple, no JVD  Cardio: RRR, +S1/S2, S3 heard, no M/R, pulsitile skin over L 4th intercostal space  Resp: coarse crackles appreciated in right lower lobe, other lung fields CTA, no crackles or wheezing.   Abdo: soft, NT, ND, +bowel sounds x4, no organomegaly or palpable mass    Extremities: WWP, no edema/cyanosis/clubbing   Vasc: 2+ radial and DP pulses b/l  Neuro: A&Ox3  Psych: speech non-pressured, thoughts goal-oriented  Skin: Stage 2 sacral decubitus ulcer, no visible jaundice   MSK: no joint swelling    MEDICATIONS:  MEDICATIONS  (STANDING):  ascorbic acid 250 milliGRAM(s) Oral daily  aspirin enteric coated 81 milliGRAM(s) Oral daily  atorvastatin 40 milliGRAM(s) Oral at bedtime  dextrose 5%. 1000 milliLiter(s) (100 mL/Hr) IV Continuous <Continuous>  dextrose 5%. 1000 milliLiter(s) (50 mL/Hr) IV Continuous <Continuous>  dextrose 50% Injectable 25 Gram(s) IV Push once  dextrose 50% Injectable 25 Gram(s) IV Push once  dextrose 50% Injectable 12.5 Gram(s) IV Push once  enoxaparin Injectable 30 milliGRAM(s) SubCutaneous every 24 hours  gabapentin 100 milliGRAM(s) Oral every 8 hours  glucagon  Injectable 1 milliGRAM(s) IntraMuscular once  insulin lispro (ADMELOG) corrective regimen sliding scale   SubCutaneous Before meals and at bedtime  mirtazapine 15 milliGRAM(s) Oral at bedtime  multivitamin 1 Tablet(s) Oral daily  pantoprazole    Tablet 40 milliGRAM(s) Oral before breakfast  piperacillin/tazobactam IVPB.. 4.5 Gram(s) IV Intermittent every 8 hours  pirfenidone 801 milliGRAM(s) Oral three times a day  zinc sulfate 220 milliGRAM(s) Oral daily    MEDICATIONS  (PRN):  bisacodyl 5 milliGRAM(s) Oral at bedtime PRN Constipation  dextrose Oral Gel 15 Gram(s) Oral once PRN Blood Glucose LESS THAN 70 milliGRAM(s)/deciliter  senna 2 Tablet(s) Oral at bedtime PRN Constipation      ALLERGIES:  Allergies    No Known Allergies    Intolerances        LABS:                        12.4   10.56 )-----------( 240      ( 22 Nov 2023 05:30 )             38.9     11-22    133<L>  |  97  |  20  ----------------------------<  91  4.6   |  31  |  0.58    Ca    9.4      22 Nov 2023 05:30  Phos  2.9     11-22  Mg     1.8     11-22    TPro  7.4  /  Alb  2.8<L>  /  TBili  0.5  /  DBili  x   /  AST  19  /  ALT  14  /  AlkPhos  67  11-21    PT/INR - ( 20 Nov 2023 15:07 )   PT: 10.8 sec;   INR: 0.95          PTT - ( 20 Nov 2023 15:07 )  PTT:32.1 sec    RADIOLOGY & ADDITIONAL TESTS: Reviewed.

## 2023-11-22 NOTE — SWALLOW BEDSIDE ASSESSMENT ADULT - SWALLOW EVAL: DIAGNOSIS
Seemingly functional oropharyngeal swallow. No jennifer clinical indicators of penetration/aspiration and pharyngeal inefficiency noted. Given concern for aspiration imaged on esophagram and current pulmonary status, recommend a modified barium swallow study (MBSS) to further assess swallow function.

## 2023-11-22 NOTE — PROGRESS NOTE ADULT - PROBLEM SELECTOR PLAN 1
#Sepsis with acute hypoxic respiratory failure  Likely aspiration PNA vs HAP i/s/o recent PNA treatment and underlying Pulmonary Disease   On admission, met 2/4 SIRS criteria with 's, WBC 17 as well as tachypneic and with increasing O2 requirements. CTPE was done to rule out PE; no evidence of PE, however CT showed evidence of RML and RLL consolidation on a background of pulmonary fibrosis.   RVP negative. Urine legionella/step negative. MRSA negative. TTE wnl.    -zosyn 4.5g q8hrs (pseudomonal dosing) given recent antibiotic use and pulmonary fibrosis   - f/u BCx's, sputum Cx  - c/w O2 via NC, wean as tolerated #Sepsis with acute hypoxic respiratory failure  Likely aspiration PNA vs HAP i/s/o recent PNA treatment and underlying Pulmonary Disease   On admission, met 2/4 SIRS criteria with 's, WBC 17 as well as tachypneic and with increasing O2 requirements. CTPE was done to rule out PE; no evidence of PE, however CT showed evidence of RML and RLL consolidation on a background of pulmonary fibrosis.   RVP negative. Urine legionella/step negative. MRSA negative. TTE wnl. blood culture neg X2    -zosyn 4.5g q8hrs (pseudomonal dosing) given recent antibiotic use and pulmonary fibrosis (11/20-11/26 for seven days course)  - c/w O2 via NC, wean as tolerated. Currently on 3L NC

## 2023-11-22 NOTE — PROGRESS NOTE ADULT - PROBLEM SELECTOR PLAN 9
F: none, caution given concern for CHF  E: replete as needed  N: Minced en moist   DVT ppx: lovenox   GI ppx: protonix 40mg qd   Dispo: pending PT  Code Status: DNR/DNI with a trial of NIV F: none, caution given concern for CHF  E: replete as needed  N: Minced en moist   DVT ppx: lovenox   GI ppx: protonix 40mg qd   Dispo: outpatient PT  Code Status: DNR/DNI with a trial of NIV

## 2023-11-22 NOTE — PROGRESS NOTE ADULT - ASSESSMENT
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  CT chest 7/18/2023 :  1. Mild distal esophageal wall thickening, possibly inflammatory related to gastroesophageal reflux given small hiatal hernia, neoplasia cannot be completely excluded. Consider endoscopic correlation.  2. No suspicious adenopathy or evidence of metastatic disease.  3. Unchanged fibrotic interstitial lung disease with basilar predominance.  ?  PFT 5/24/22: FVC 1.35L (42%), FEV1 1.28L (57%), TLC 2.18L (37%), DLCO x / FENO 44 / 264m desat to 84%  PFT 5/24/22: FVC 1.12L (36%), FEV1 1.04L (47%), TLC 2.40L (41%), DLCO x / 312m desat to 83%, titrated to     MRSA (-)  Strep and legionella negative (-)    #Post covid fibrosis  #Pneumonia  #AHRF    Here with increased dyspnea from baseline and worsening cough with and increased oxygen requirements from baseline with new leukocytosis and lobar consolidation. Suspect AHRF in the setting due to bacterial pneumonia. Not on chronic steroids/immunosuppression so low suspicion for PCP pneumonia. Euvolemic on exam, -JVD, trace edema (reportedly improving), no effusions so low suspicion for fluid overload. No new ground glass on CT scan to suggest acute ILD flare.  - Agree with pseudomonal zosyn, anticipate 7 day course total   - f/u cultures  - no need for pulse dose steroids at this time

## 2023-11-22 NOTE — CONSULT NOTE ADULT - ASSESSMENT
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 Carter outpatient with concerns of AHRF 2/2 PNA vs. PE vs. worsening pulmonary fibrosis, found to have RML and RLL consolidation concerning for PNA, on a background of pulmonary fibrosis, admitted for IV antibiotics with a trial of NIV if needed.     Problem/Plan - 1:  ·  Problem: Pneumonia, aspiration.   ·  Plan: #Sepsis with acute hypoxic respiratory failure  Likely aspiration PNA vs HAP i/s/o recent PNA treatment and underlying Pulmonary Disease   On admission, met 2/4 SIRS criteria with 's, WBC 17 as well as tachypneic and with increasing O2 requirements. CTPE was done to rule out PE; no evidence of PE, however CT showed evidence of RML and RLL consolidation on a background of pulmonary fibrosis.   RVP negative. Urine legionella/step negative. MRSA negative. TTE wnl.    -zosyn 4.5g q8hrs (pseudomonal dosing) given recent antibiotic use and pulmonary fibrosis   - f/u BCx's, sputum Cx  - c/w O2 via NC, wean as tolerated.    Problem/Plan - 2:  ·  Problem: Aspiration precautions.   ·  Plan: The patient had a barium swallow on 10/24/2023 showing Aspiration with Mild dysmotility. Current PNA could be 2/2 to aspiration as the CT consilidation was found in the right lobes.   Plan:  - S&S consult given aspiration risk  - minced and moist diet for now pending official evaluation.    Problem/Plan - 3:  ·  Problem: Pulmonary fibrosis.   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 Carter outpatient, currently on pirfenidone 267mg 3 tablets TID.  - pirfenidone 267mg 3 tablets TID  - protonix 40mg qd GI ppx while on pirfenidone   - pulm following, appreciate recs.    Problem/Plan - 4:  ·  Problem: Adult failure to thrive.   ·  Plan: #sacral decubitus ulcer, stage 2  Patient lost > 30 lbs in 6 months in setting of chronic pulmonary fibrosis. Also with clean based stage 2 sacral decubitus ulcer on admission.  - Supplement diet with ensure per nutrition  - PT eval  - dress ulcer and separate from bed as much as possible  - Encourage OOBTC.    Problem/Plan - 5:  ·  Problem: CAD (coronary artery disease).   ·  Plan: Patient with hx of CABG x2 (LIMA to LAD), as well as PCI to LCx and LAD. Patient on asa 81mg qd.   - c/w asa 81mg qd.    Problem/Plan - 6:  ·  Problem: Diabetes mellitus.   ·  Plan: The patient has a Hx of Diabetes Mellitus. On outpatient Jardiance 25mg. A1c on this admission is 7.7  Plan:  - Monitor glucose   - c/w mISS.    Problem/Plan - 7:  ·  Problem: Hypertension.   ·  Plan: Patient no longer on any meds for HTN. BP on admission 129/83-->150/75. Patient was on amlodipine, however states that he has not taken it due to development of edema in LE.   Plan:  - monitor BP.    Problem/Plan - 8:  ·  Problem: Hyperlipidemia.   ·  Plan: Patient on rosuvastatin 10mg qhs.  - c/w atorvastatin 40mg qhs TI for rosuvastatin 10mg qhs.    Problem/Plan - 9:  ·  Problem: Prophylactic measure.   ·  Plan: F: none, caution given concern for CHF  E: replete as needed  N: minced and moist, pending S&S eval  DVT ppx: lovenox   GI ppx: protonix 40mg qd   Dispo: pending PT  Code Status: DNR/DNI with a trial of NIV.

## 2023-11-22 NOTE — PROGRESS NOTE ADULT - PROBLEM SELECTOR PLAN 4
#sacral decubitus ulcer, stage 2  Patient lost > 30 lbs in 6 months in setting of chronic pulmonary fibrosis. Also with clean based stage 2 sacral decubitus ulcer on admission.  - Supplement diet with ensure per nutrition  - PT eval  - dress ulcer and separate from bed as much as possible  - Encourage OOBTC

## 2023-11-22 NOTE — CONSULT NOTE ADULT - SUBJECTIVE AND OBJECTIVE BOX
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 Patient has been following up with Dr. Carter since his diagnosis of pulmonary fibrosis. He was initially on ofev, but was recently started on pirfenidone 8/2023 for his pulmonary fibrosis. Patient was recently treated for a bacterial PNA at [facility] 9/11-9/15 and was discharged on cefpodoxime to complete a course of antibiotics. Patient was seen by Dr. Carter outpatient with complaints of dyspnea at rest, increasing O2 requirements (requiring O2 while at rest, not just when ambulating), increased agitation. Wife at bedside reported that patient's presentation was similar to his previous episode of PNA in September. Given patient's presentation, he was sent to the [facility] ED.   On arrival to the unit, the patient was accompanied by his wife. The patient states that his breathing is better compared to this AM. He has been coughing less and denies bringing up any phlegm at this time. No chest pain or abdominal pain. The patient is requesting coffee as he usually drinks some to go to sleep at night. Looks comfortable and not in distress at this time.     ED Course:   Vitals: Afebrile, Tachycardic (120), /89, Spo2 96% on 6L NC  Labs: WBC 17.4, Coags WNL, Na 133, Cl 90, Glucose 217, Ca 10.7, Pro-, A1c 7.7, ABG with respiratory acidosis,   Imaging: CT Chest: New consolidation is noted in the right middle lobe and in the right lower lobe on a background of thickened interstitial lung markings and pulmonary fibrosis and honeycombing, likely represent acute pneumonia superimposed on pulmonary fibrosis. Cannot exclude a degree of congestive heart failure.  Interventions: Vanc and Zosyn in ED  Consults: Pulmonology, ICU -> recommends Telemetry  (20 Nov 2023 20:26)    PAST MEDICAL & SURGICAL HISTORY:  Type 2 diabetes mellitus  Diabetes      Essential hypertension  HTN (hypertension)      CAD (coronary artery disease)      H pylori ulcer      Status post cataract extraction  bilateral      Other postprocedural status  S/P ear surgery      History of coronary artery bypass graft x 2        MEDICATIONS  (STANDING):  ascorbic acid 250 milliGRAM(s) Oral daily  aspirin enteric coated 81 milliGRAM(s) Oral daily  atorvastatin 40 milliGRAM(s) Oral at bedtime  dextrose 5%. 1000 milliLiter(s) (100 mL/Hr) IV Continuous <Continuous>  dextrose 5%. 1000 milliLiter(s) (50 mL/Hr) IV Continuous <Continuous>  dextrose 50% Injectable 25 Gram(s) IV Push once  dextrose 50% Injectable 25 Gram(s) IV Push once  dextrose 50% Injectable 12.5 Gram(s) IV Push once  enoxaparin Injectable 30 milliGRAM(s) SubCutaneous every 24 hours  glucagon  Injectable 1 milliGRAM(s) IntraMuscular once  insulin lispro (ADMELOG) corrective regimen sliding scale   SubCutaneous Before meals and at bedtime  mirtazapine 15 milliGRAM(s) Oral at bedtime  multivitamin 1 Tablet(s) Oral daily  pantoprazole    Tablet 40 milliGRAM(s) Oral before breakfast  piperacillin/tazobactam IVPB.. 4.5 Gram(s) IV Intermittent every 8 hours  pirfenidone 801 milliGRAM(s) Oral three times a day  zinc sulfate 220 milliGRAM(s) Oral daily    MEDICATIONS  (PRN):  bisacodyl 5 milliGRAM(s) Oral at bedtime PRN Constipation  dextrose Oral Gel 15 Gram(s) Oral once PRN Blood Glucose LESS THAN 70 milliGRAM(s)/deciliter  senna 2 Tablet(s) Oral at bedtime PRN Constipation            Home Living Status :  lives with his wife in an elevator accessible apartment building , 3  steps to enter           -  Prior Home Care Services :  none          Baseline Functional Level Prior to Admission :             - ADL's/ IADL's :  independent          - Ambulatory status prior to admission :   walked with no assistive devices          FAMILY HISTORY:      CBC Full  -  ( 22 Nov 2023 05:30 )  WBC Count : 10.56 K/uL  RBC Count : 4.20 M/uL  Hemoglobin : 12.4 g/dL  Hematocrit : 38.9 %  Platelet Count - Automated : 240 K/uL  Mean Cell Volume : 92.6 fl  Mean Cell Hemoglobin : 29.5 pg  Mean Cell Hemoglobin Concentration : 31.9 gm/dL  Auto Neutrophil # : 8.31 K/uL  Auto Lymphocyte # : 1.12 K/uL  Auto Monocyte # : 0.74 K/uL  Auto Eosinophil # : 0.29 K/uL  Auto Basophil # : 0.07 K/uL  Auto Neutrophil % : 78.7 %  Auto Lymphocyte % : 10.6 %  Auto Monocyte % : 7.0 %  Auto Eosinophil % : 2.7 %  Auto Basophil % : 0.7 %      11-22    133<L>  |  97  |  20  ----------------------------<  91  4.6   |  31  |  0.58    Ca    9.4      22 Nov 2023 05:30  Phos  2.9     11-22  Mg     1.8     11-22    TPro  7.4  /  Alb  2.8<L>  /  TBili  0.5  /  DBili  x   /  AST  19  /  ALT  14  /  AlkPhos  67  11-21      Urinalysis Basic - ( 22 Nov 2023 05:30 )    Color: x / Appearance: x / SG: x / pH: x  Gluc: 91 mg/dL / Ketone: x  / Bili: x / Urobili: x   Blood: x / Protein: x / Nitrite: x   Leuk Esterase: x / RBC: x / WBC x   Sq Epi: x / Non Sq Epi: x / Bacteria: x        Radiology :     < from: CT Angio Chest PE Protocol w/ IV Cont (11.20.23 @ 16:38) >  ACC: [id number] EXAM:  CT ANGIO CHEST PULM [facility]   ORDERED BY: BRISA QUIROZ     PROCEDURE DATE:  11/20/2023          INTERPRETATION:  CLINICAL INFORMATION: Chest pain    COMPARISON: 7/18/2023    CONTRAST/COMPLICATIONS:  IV Contrast: Isovue 370  70 cc administered   30 cc discarded  Oral Contrast: NONE  Complications: None reported at time of study completion    PROCEDURE:  CT Angiography of the Chest.  Sagittal and coronal reformats were performed as well as 3D (MIP)   reconstructions.    FINDINGS:    LUNGS AND AIRWAYS: New consolidation is noted in the right middle lobe   and to a lesser extent in the right lower lobe. These findings are on a   background of thickened interstitial lung markings and pulmonary fibrosis   with honeycombing.  PLEURA: No pleural effusion.  MEDIASTINUM AND MARY: Stable mild mediastinal adenopathy.  VESSELS: No pulmonary embolism. Prominent main pulmonary trunk, measuring   3.3 cm across.  HEART: Cardiomegaly No pericardial effusion.  CHEST WALL AND LOWER NECK: Within normal limits.  VISUALIZED UPPER ABDOMEN: Within normal limits.  BONES: Degenerative changes    IMPRESSION:  New consolidation is noted in the right middle lobe and in the right   lower lobe. These findings are on a background of thickened interstitial   lung markings and pulmonary fibrosis and honeycombing. Findings likely   represent acute pneumonia superimposed on pulmonary fibrosis. Also,   cannot exclude a degree of congestive heart failure.    No pulmonary embolism        < end of copied text >          Review of Systems : per HPI         Vital Signs Last 24 Hrs  T(C): 36.4 (22 Nov 2023 06:16), Max: 36.8 (21 Nov 2023 17:14)  T(F): 97.5 (22 Nov 2023 06:16), Max: 98.2 (21 Nov 2023 17:14)  HR: 88 (22 Nov 2023 03:35) (82 - 103)  BP: 120/57 (22 Nov 2023 03:35) (96/52 - 126/58)  BP(mean): 82 (22 Nov 2023 03:35) (70 - 84)  RR: 16 (22 Nov 2023 03:35) (16 - 18)  SpO2: 100% (22 Nov 2023 03:35) (98% - 100%)    Parameters below as of 22 Nov 2023 03:35  Patient On (Oxygen Delivery Method): nasal cannula  O2 Flow (L/min): 4          Physical Exam : cachectic 78 y o man lying comfortably in semi Fair's position , awake , alert , no acute complaints     Head : normocephalic , atraumatic    Eyes : PERRLA , EOMI , no nystagmus , sclera anicteric    ENT / FACE : neg nasal discharge , uvula midline , no oropharyngeal erythema / exudate    Neck : supple , negative JVD , negative carotid bruits , no thyromegaly    Chest :  R base crackles     Cardiovascular : regular rate and rhythm , neg murmurs / rubs / gallops    Abdomen : soft , non distended , non tender to palpation in all 4 quadrants ,  normal bowel sounds     Extremities : WWP , neg cyanosis /clubbing / edema     Neurologic Exam :     Alert and oriented  x 3        Motor Exam:                Right UE:                     no focal weakness ,  > 3+/5 throughout     Left UE:                       no focal weakness ,  > 3+/5 throughout          Right LE:          no focal weakness ,  > 3+/5 throughout          Left LE:          no focal weakness ,  > 3+/5 throughout           Sensation :         intact to light touch x 4 extremities                                DTR :           biceps/brachioradialis : equal                            patella/ankle : equal            Gait :  not tested          PM&R Impression : admitted for PNA     - deconditioned    - no focal weakness          Recommendations / Plan :       1) Physical / Occupational therapy focusing on therapeutic exercises , equipment evaluation , bed mobility/transfer out of bed evaluation , progressive ambulation with assistive devices prn .    2) Current disposition plan recommendation  :    pending functional progress

## 2023-11-22 NOTE — PHYSICAL THERAPY INITIAL EVALUATION ADULT - ADDITIONAL COMMENTS
Pt. resides in an elevator building, uses rollator for ambulation outdoors, RW vs no device indoors.

## 2023-11-22 NOTE — SWALLOW BEDSIDE ASSESSMENT ADULT - COMMENTS
Esophagram completed as an outpatient on 10/24/23:  image: Fibrotic changes in the lungs bilaterally. No pleural effusion. No pneumothorax. Degenerative disease of the spine. Radiographic and fluoroscopic examination of the esophagus was performed using barium contrast. At the beginning of the exam, patient aspirated and had cough response. The patient was then able to complete the exam   without further aspiration. Contrast passed normally through the proximal and midesophagus, but there is mild dysmotility in the distal esophagus.   Contrast then flows freely into the stomach. No hiatal hernia. A 13 mm barium pill passes normally.  IMPRESSION:1. Aspiration.  2. Mild dysmotility. Esophagram completed as an outpatient on 10/24/23:  image: Fibrotic changes in the lungs bilaterally. No pleural effusion. No pneumothorax. Degenerative disease of the spine. Radiographic and fluoroscopic examination of the esophagus was performed using barium contrast. At the beginning of the exam, patient aspirated and had cough response. The patient was then able to complete the exam   without further aspiration. Contrast passed normally through the proximal and midesophagus, but there is mild dysmotility in the distal esophagus.   Contrast then flows freely into the stomach. No hiatal hernia. A 13 mm barium pill passes normally.  IMPRESSION:1. Aspiration.  2. Mild dysmotility.    PLOF: Pt reports consuming a regular, vegetarian diet at home. Denied swallowing difficulty. Pt endorsed significant unintentional weight loss over the last half a year due to medical complications. Pt appears frail and cachetic. Pt endorsed cough with larger sips of thin liquids. He reports consuming small sips. He reported making these modifications about 2 months ago.

## 2023-11-23 NOTE — PROGRESS NOTE ADULT - ASSESSMENT
Patient is a 79 y/o M with PMHx of HTN, HLD, DM, CAD s/p CABG x2 and PCI LCx and LAD, PAD, hx of GIB and pulmonary fibrosis 2/2 COVID in 2020 (2-3L at home on ambulation) admitted for  AHRF 2/2 PNA admitted for IV antibiotics with clinical improvement.

## 2023-11-23 NOTE — PROGRESS NOTE ADULT - PROBLEM SELECTOR PLAN 1
#Sepsis with acute hypoxic respiratory failure  Likely aspiration PNA vs HAP i/s/o recent PNA treatment and underlying Pulmonary Disease   On admission, met 2/4 SIRS criteria with 's, WBC 17 as well as tachypneic and with increasing O2 requirements. CTPE was done to rule out PE; no evidence of PE, however CT showed evidence of RML and RLL consolidation on a background of pulmonary fibrosis.   RVP negative. Urine legionella/step negative. MRSA negative. TTE wnl. blood culture neg X2    -zosyn 4.5g q8hrs (pseudomonal dosing) given recent antibiotic use and pulmonary fibrosis (11/20-11/26 for seven days course)  - f/u pulm recs to switch to oral abx  - c/w O2 via NC, wean as tolerated. Currently on 3L NC Dutasteride Pregnancy And Lactation Text: This medication is absolutely contraindicated in women, especially during pregnancy and breast feeding. Feminization of male fetuses is possible if taking while pregnant.

## 2023-11-23 NOTE — DISCHARGE NOTE PROVIDER - NSDCMRMEDTOKEN_GEN_ALL_CORE_FT
aspirin 81 mg oral delayed release tablet: 1 tab(s) orally once a day  BENZONATATE 200 MG CAPSULE: take 1 capsule by mouth three times a day if needed  CoQ10 300 mg oral capsule: 1 cap(s) orally once a day  ESBRIET 267 MG TABLET:   JARDIANCE 25 MG TABLET:   MIRTAZAPINE 15 MG TABLET:   PANTOPRAZOLE SOD DR 40 MG TAB:   ROSUVASTATIN CALCIUM 10 MG TAB:    aspirin 81 mg oral delayed release tablet: 1 tab(s) orally once a day  BENZONATATE 200 MG CAPSULE: take 1 capsule by mouth three times a day if needed  CoQ10 300 mg oral capsule: 1 cap(s) orally once a day  ESBRIET 267 MG TABLET: 1 tab(s) orally once a day  JARDIANCE 25 MG TABLET:   levoFLOXacin 500 mg oral tablet: 1 tab(s) orally once a day  MIRTAZAPINE 15 MG TABLET: 1 tab(s) orally once a day  PANTOPRAZOLE SOD DR 40 MG TAB: 1 tab(s) orally once a day  ROSUVASTATIN CALCIUM 10 MG TAB:

## 2023-11-23 NOTE — PROGRESS NOTE ADULT - PROBLEM SELECTOR PLAN 2
The patient had a barium swallow on 10/24/2023 showing Aspiration with Mild dysmotility. Current PNA could be 2/2 to aspiration as the CT consilidation was found in the right lobes.   Plan:  - S&S consult given aspiration risk  - minced and moist diet for now pending official evaluation
The patient had a barium swallow on 10/24/2023 showing Aspiration with Mild dysmotility. Current PNA could be 2/2 to aspiration as the CT consolidation was found in the right lobes.     Plan:  - minced and moist diet  - pending modified barium swallow study and will advance diet pending results of test
The patient had a barium swallow on 10/24/2023 showing Aspiration with Mild dysmotility. Current PNA could be 2/2 to aspiration as the CT consilidation was found in the right lobes.   Plan:  - S&S consult given aspiration risk  - minced and moist diet for now pending official evaluation
The patient had a barium swallow on 10/24/2023 showing Aspiration with Mild dysmotility. Current PNA could be 2/2 to aspiration as the CT consolidation was found in the right lobes.     Plan:  - minced and moist diet  - pending modified barium swallow study and will advance diet pending results of test

## 2023-11-23 NOTE — PROGRESS NOTE ADULT - PROBLEM SELECTOR PLAN 5
Patient with hx of CABG x2 (LIMA to LAD), as well as PCI to LCx and LAD. Patient on asa 81mg qd.     Plan:   - c/w asa 81mg qd
Patient with hx of CABG x2 (LIMA to LAD), as well as PCI to LCx and LAD. Patient on asa 81mg qd.   - c/w asa 81mg qd
Patient with hx of CABG x2 (LIMA to LAD), as well as PCI to LCx and LAD. Patient on asa 81mg qd.   - c/w asa 81mg qd
Patient with hx of CABG x2 (LIMA to LAD), as well as PCI to LCx and LAD. Patient on asa 81mg qd.     Plan:   - c/w asa 81mg qd

## 2023-11-23 NOTE — DISCHARGE NOTE PROVIDER - NSDCCPCAREPLAN_GEN_ALL_CORE_FT
PRINCIPAL DISCHARGE DIAGNOSIS  Diagnosis: Pneumonia  Assessment and Plan of Treatment: You have pneumonia, which is an infection in your lungs. Now that you are going home, follow the health care provider's instructions on taking care of yourself at home. Use the information below as a reminder. With pneumonia, you may cough up greenish or yellow phlegm. You also may run a fever and have the chills. Pneumonia can make it hard to breathe. You may feel like you've run up a flight of stairs when you were just sitting still. You were treated inpatient with IV antibiotics for 6 days. You will go home with one pill of Levaquin 500mg by mouth; please take this on 11/26/2023 to finish total of 7 days of antibiotics. Please not that your sleeping and eating may take up to a week to return to normal. Your energy level may take 2 weeks or more to return to normal.

## 2023-11-23 NOTE — PROGRESS NOTE ADULT - PROBLEM SELECTOR PROBLEM 3
[Feeling Poorly] : feeling poorly
Pulmonary fibrosis
[Feeling Tired] : not feeling tired
[Leg Weakness] : leg weakness
Pulmonary fibrosis
[Poor Coordination] : poor coordination
[Abnormal Sensation] : an abnormal sensation
[Difficulty Walking] : difficulty walking
[Ataxia] : ataxia
[Negative] : Respiratory
Pulmonary fibrosis
Pulmonary fibrosis

## 2023-11-23 NOTE — DISCHARGE NOTE PROVIDER - ATTENDING DISCHARGE PHYSICAL EXAMINATION:
I have read and agree with the resident Discharge Note above.  Patient seen and discussed with resident team on the day of discharge.     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 Carter outpatient with concerns of acute hypoxic respiratory failure found to have RML and RLL PNA, on a background of pulmonary fibrosis, who was admitted for IV antibiotics with a trial of NIV if needed    #Acute respiratory with hypoxia due to Sepsis due to RML and RLL pneumonia  #Sepsis - POA due to tachycardia and WBC elevation, tachycardia with source PNA as above  #PNA  #Pulmonary fibrosis  - To complete 7 day course of antibiotics with Zosyn transitioned to Levaquin (to complete tomorrow 11/25).  QTc < 500.  Coverage for gram negatives for aspiration as well as pseudomonas due to structural lung disease  - CT PE negative for PE  - f/u with Dr Carter  - PCP f/u on Monday, consider outpatient palliative vs geriatrics referral    #Dysphagia  - seen by speech and swallow, no jennifer clinical indicators of aspiration on 11/22, barium swallow on 11/24 and per evaluation. Low suspicion aspiration due to oropharyngeal dysfunction contributed to patient's pulmonary status. Aspiration was infrequent and deemed trace    Attending exam on day of discharge:   Gen: cachetic, NAD  HEENT: NCAT, MMM  Neck: supple, no JVD  CV: RRR, no m/g/r appreciated  Pulm: CTA B, no w/r/r, normal work of breathing, speaking in full sentences on NC  Abd: +BS, soft, NTND  : deferred  Ext: WWP, no c/c/e  MSK: normal range of motion, generalized weakness  Neuro: AOx3, no change from baseline  Psych: pleasant, conversant and appropriate    I was physically present for the evaluation and management services provided. I agree with the included history, physical, and plan which I reviewed and edited where appropriate. I spent > 30 minutes with the patient and the patient's family on direct patient care and discharge planning with more than 50% of the visit spent on counseling and/or coordination of care.     Edward Corrales  [phone]

## 2023-11-23 NOTE — PROGRESS NOTE ADULT - PROBLEM SELECTOR PLAN 4
#sacral decubitus ulcer, stage 2  Patient lost > 30 lbs in 6 months in setting of chronic pulmonary fibrosis. Also with clean based stage 2 sacral decubitus ulcer on admission.  PT recommends outpatient PT    Plan:   - Supplement diet with ensure per nutrition  - dress ulcer and separate from bed as much as possible  - Encourage OOBTC

## 2023-11-23 NOTE — DISCHARGE NOTE PROVIDER - DETAILS OF MALNUTRITION DIAGNOSIS/DIAGNOSES
This patient has been assessed with a concern for Malnutrition and was treated during this hospitalization for the following Nutrition diagnosis/diagnoses:     -  11/22/2023: Severe protein-calorie malnutrition   -  11/22/2023: Underweight (BMI < 19)

## 2023-11-23 NOTE — PROGRESS NOTE ADULT - PROBLEM SELECTOR PLAN 8
Patient on rosuvastatin 10mg qhs.    Plan:   - c/w atorvastatin 40mg qhs TI for rosuvastatin 10mg qhs
Patient on rosuvastatin 10mg qhs.    Plan:   - c/w atorvastatin 40mg qhs TI for rosuvastatin 10mg qhs
Patient on rosuvastatin 10mg qhs.  - c/w atorvastatin 40mg qhs TI for rosuvastatin 10mg qhs
Patient on rosuvastatin 10mg qhs.  - c/w atorvastatin 40mg qhs TI for rosuvastatin 10mg qhs

## 2023-11-23 NOTE — DISCHARGE NOTE PROVIDER - HOSPITAL COURSE
#Discharge: do not delete    Patient is __ yo M/F with past medical history of _____ presented with _____, found to have _____ (one liner)    Hospital course (by problem):     Patient was discharged to: (home/[facility]/acute rehab/hospice, etc, and with what services – home health PT/RN? Home O2?)    New medications:   Changes to old medications:  Medications that were stopped:    Items to follow up as outpatient:    Physical exam at the time of discharge:       #Discharge: do not delete    Patient is a 77 y/o M with PMHx of HTN, HLD, DM, CAD s/p CABG x2 and PCI LCx and LAD, PAD, hx of GIB and pulmonary fibrosis 2/2 COVID in 2020 (2-3L at home on ambulation) admitted for  AHRF 2/2 PNA admitted for IV antibiotics with clinical improvement.       #Sepsis with acute hypoxic respiratory failure  Likely aspiration PNA vs HAP i/s/o recent PNA treatment and underlying Pulmonary Disease   On admission, met 2/4 SIRS criteria with 's, WBC 17 as well as tachypneic and with increasing O2 requirements. CTPE was done to rule out PE; no evidence of PE, however CT showed evidence of RML and RLL consolidation on a background of pulmonary fibrosis. RVP negative. Urine legionella/step negative. MRSA negative. TTE wnl. blood culture neg X2. S/p  zosyn 4.5g q8hrs (pseudomonal dosing) given recent antibiotic use and pulmonary fibrosis (11/20-11/___ for seven days course  - c/w 3L NC  - c/w Levaquin     #Aspiration precautions  The patient had a barium swallow on 10/24/2023 showing Aspiration with Mild dysmotility. Current PNA could be 2/2 to aspiration as the CT consolidation was found in the right lobes. While in patient patient was put in minced and moist diet. Pending modified barium swallow study and will advance diet pending results of test.  -Resolved    # Pulmonary fibrosis  Patient with pulmonary fibrosis 2/2 COVID in 2020, follows up with Dr. Carter outpatient, currently on pirfenidone. Per Pulm, does not suspect worsening of ILD. Pirfenidone 801 mg tablets TID. Protonix 40mg qd GI ppx while on pirfenidone.  - f/u pulm outpatient    #sacral decubitus ulcer, stage 2  Patient lost > 30 lbs in 6 months in setting of chronic pulmonary fibrosis. Also with clean based stage 2 sacral decubitus ulcer on admission. PT recommends outpatient PT. Supplement diet with ensure per nutrition. Dress ulcer and separate from bed as much as possible  - Encourage OOBTC  - outpatient PCP appointment    #CAD (coronary artery disease)  Patient with hx of CABG x2 (LIMA to LAD), as well as PCI to LCx and LAD. Patient on asa 81mg qd.   - c/w asa 81mg qd    Diabetes mellitus  The patient has a Hx of Diabetes Mellitus. On outpatient Jardiance 25mg. A1c on this admission is 7.7.   - c/w Jardiance    #diabetic neuropathy   Gabapentin 100 mg TID  - c/w home meds    #Hypertension  Patient no longer on any meds for HTN. BP on admission 129/83-->150/75. Patient was on amlodipine, however states that he has not taken it due to development of edema in LE.   - monitor BP    #Hyperlipidemia  Patient on rosuvastatin 10mg qhs.  - c/w atorvastatin 40mg qhs TI for rosuvastatin 10mg qhs.    New medications:   Changes to old medications:  Medications that were stopped:    Items to follow up as outpatient:    Physical exam at the time of discharge:           #Discharge: do not delete    Patient is a 77 y/o M with PMHx of HTN, HLD, DM, CAD s/p CABG x2 and PCI LCx and LAD, PAD, hx of GIB and pulmonary fibrosis 2/2 COVID in 2020 (2-3L at home on ambulation) admitted for  AHRF 2/2 PNA admitted for IV antibiotics with clinical improvement.       #Sepsis with acute hypoxic respiratory failure  Likely aspiration PNA vs HAP i/s/o recent PNA treatment and underlying Pulmonary Disease   On admission, met 2/4 SIRS criteria with 's, WBC 17 as well as tachypneic and with increasing O2 requirements. CTPE was done to rule out PE; no evidence of PE, however CT showed evidence of RML and RLL consolidation on a background of pulmonary fibrosis. RVP negative. Urine legionella/step negative. MRSA negative. TTE wnl. blood culture neg X2. S/p  zosyn 4.5g q8hrs (pseudomonal dosing) given recent antibiotic use and pulmonary fibrosis (11/19-11/25) for seven days course  - c/w 3L NC  - c/w Levaquin 500mg PO daily    #Aspiration precautions  The patient had a barium swallow on 10/24/2023 showing Aspiration with Mild dysmotility. Current PNA could be 2/2 to aspiration as the CT consolidation was found in the right lobes. While in patient patient was put in minced and moist diet. Pending modified barium swallow study and will advance diet pending results of test.  -Resolved    # Pulmonary fibrosis  Patient with pulmonary fibrosis 2/2 COVID in 2020, follows up with Dr. Carter outpatient, currently on pirfenidone. Per Pulm, does not suspect worsening of ILD. Pirfenidone 801 mg tablets TID. Protonix 40mg qd GI ppx while on pirfenidone.  - f/u pulm outpatient    #sacral decubitus ulcer, stage 2  Patient lost > 30 lbs in 6 months in setting of chronic pulmonary fibrosis. Also with clean based stage 2 sacral decubitus ulcer on admission. PT recommends outpatient PT. Supplement diet with ensure per nutrition. Dress ulcer and separate from bed as much as possible  - Encourage OOBTC  - outpatient PCP appointment    #CAD (coronary artery disease)  Patient with hx of CABG x2 (LIMA to LAD), as well as PCI to LCx and LAD. Patient on asa 81mg qd.   - c/w asa 81mg qd    Diabetes mellitus  The patient has a Hx of Diabetes Mellitus. On outpatient Jardiance 25mg. A1c on this admission is 7.7.   - c/w Jardiance    #diabetic neuropathy   Gabapentin 100 mg TID  - c/w home meds    #Hypertension  Patient no longer on any meds for HTN. BP on admission 129/83-->150/75. Patient was on amlodipine, however states that he has not taken it due to development of edema in LE.   - monitor BP    #Hyperlipidemia  Patient on rosuvastatin 10mg qhs.  - c/w atorvastatin 40mg qhs TI for rosuvastatin 10mg qhs.    New medications: levaquin  Changes to old medications: none  Medications that were stopped: none  Items to follow up as outpatient: PCP appointment    Physical exam at the time of discharge:           #Discharge: do not delete    Patient is a 77 y/o M with PMHx of HTN, HLD, DM, CAD s/p CABG x2 and PCI LCx and LAD, PAD, hx of GIB and pulmonary fibrosis 2/2 COVID in 2020 (2-3L at home on ambulation) admitted for  AHRF 2/2 PNA admitted for IV antibiotics with clinical improvement.       #Sepsis with acute hypoxic respiratory failure  Likely aspiration PNA vs HAP i/s/o recent PNA treatment and underlying Pulmonary Disease   On admission, met 2/4 SIRS criteria with 's, WBC 17 as well as tachypneic and with increasing O2 requirements. CTPE was done to rule out PE; no evidence of PE, however CT showed evidence of RML and RLL consolidation on a background of pulmonary fibrosis. RVP negative. Urine legionella/step negative. MRSA negative. TTE wnl. blood culture neg X2. S/p  zosyn 4.5g q8hrs (pseudomonal dosing) given recent antibiotic use and pulmonary fibrosis (11/19-11/25) for seven days course  - c/w 3L NC  - c/w Levaquin 500mg PO daily    #Aspiration precautions  The patient had a barium swallow on 10/24/2023 showing Aspiration with Mild dysmotility. Current PNA could be 2/2 to aspiration as the CT consolidation was found in the right lobes. While in patient patient was put in minced and moist diet. Pending modified barium swallow study and will advance diet pending results of test.  -Resolved    # Pulmonary fibrosis  Patient with pulmonary fibrosis 2/2 COVID in 2020, follows up with Dr. Carter outpatient, currently on pirfenidone. Per Pulm, does not suspect worsening of ILD. Pirfenidone 801 mg tablets TID. Protonix 40mg qd GI ppx while on pirfenidone.  - f/u pulm outpatient    #sacral decubitus ulcer, stage 2  Patient lost > 30 lbs in 6 months in setting of chronic pulmonary fibrosis. Also with clean based stage 2 sacral decubitus ulcer on admission. PT recommends outpatient PT. Supplement diet with ensure per nutrition. Dress ulcer and separate from bed as much as possible  - Encourage OOBTC  - outpatient PCP appointment    #CAD (coronary artery disease)  Patient with hx of CABG x2 (LIMA to LAD), as well as PCI to LCx and LAD. Patient on asa 81mg qd.   - c/w asa 81mg qd    Diabetes mellitus  The patient has a Hx of Diabetes Mellitus. On outpatient Jardiance 25mg. A1c on this admission is 7.7.   - c/w Jardiance    #diabetic neuropathy   Gabapentin 100 mg TID  - c/w home meds    #Hypertension  Patient no longer on any meds for HTN. BP on admission 129/83-->150/75. Patient was on amlodipine, however states that he has not taken it due to development of edema in LE.   - monitor BP    #Hyperlipidemia  Patient on rosuvastatin 10mg qhs.  - c/w atorvastatin 40mg qhs TI for rosuvastatin 10mg qhs.    New medications: levaquin  Changes to old medications: none  Medications that were stopped: none  Items to follow up as outpatient: PCP appointment    Physical exam at the time of discharge:  General: NAD, thin appearing, not in acute distress  Head: NC/AT; MMM; PERRL; EOMI;  Neck: Supple; no JVD  Respiratory: CTAB; no wheezes/rales/rhonchi  Cardiovascular: Regular rhythm/rate; S1/S2+, no murmurs, rubs gallops   Gastrointestinal: Soft; NTND; bowel sounds normal and present  Extremities: WWP; no edema/cyanosis  Neurological: A&Ox3, CNII-XII grossly intact; no obvious focal deficits         #Discharge: do not delete    Patient is a 77 y/o M with PMHx of HTN, HLD, DM, CAD s/p CABG x2 and PCI LCx and LAD, PAD, hx of GIB and pulmonary fibrosis 2/2 COVID in 2020 (2-3L at home on ambulation) admitted for  AHRF 2/2 PNA admitted for IV antibiotics with clinical improvement.       #Sepsis with acute hypoxic respiratory failure  Likely aspiration PNA vs HAP i/s/o recent PNA treatment and underlying Pulmonary Disease   On admission, met 2/4 SIRS criteria with 's, WBC 17 as well as tachypneic and with increasing O2 requirements. CTPE was done to rule out PE; no evidence of PE, however CT showed evidence of RML and RLL consolidation on a background of pulmonary fibrosis. RVP negative. Urine legionella/step negative. MRSA negative. TTE wnl. blood culture neg X2. S/p  zosyn 4.5g q8hrs (pseudomonal dosing) given recent antibiotic use and pulmonary fibrosis (11/19-11/25) for seven days course  - c/w 3L NC  - c/w Levaquin 500mg PO daily    #Aspiration precautions  The patient had a barium swallow on 10/24/2023 showing Aspiration with Mild dysmotility. Current PNA could be 2/2 to aspiration as the CT consolidation was found in the right lobes. While in patient patient was put in minced and moist diet. Pending modified barium swallow study and will advance diet pending results of test.  -Resolved    # Pulmonary fibrosis  Patient with pulmonary fibrosis 2/2 COVID in 2020, follows up with Dr. Carter outpatient, currently on pirfenidone. Per Pulm, does not suspect worsening of ILD. Pirfenidone 801 mg tablets TID. Protonix 40mg qd GI ppx while on pirfenidone.  - f/u pulm outpatient    #sacral decubitus ulcer, stage 2  Patient lost > 30 lbs in 6 months in setting of chronic pulmonary fibrosis. Also with clean based stage 2 sacral decubitus ulcer on admission. PT recommends outpatient PT. Supplement diet with ensure per nutrition. Dress ulcer and separate from bed as much as possible  - Encourage OOBTC  - outpatient PCP appointment    #CAD (coronary artery disease)  Patient with hx of CABG x2 (LIMA to LAD), as well as PCI to LCx and LAD. Patient on asa 81mg qd.   - c/w asa 81mg qd    Diabetes mellitus  The patient has a Hx of Diabetes Mellitus. On outpatient Jardiance 25mg. A1c on this admission is 7.7.   - c/w Jardiance    #diabetic neuropathy   Gabapentin 100 mg TID  - c/w home meds    #Hypertension  Patient no longer on any meds for HTN. BP on admission 129/83-->150/75. Patient was on amlodipine, however states that he has not taken it due to development of edema in LE.   - monitor BP    #Hyperlipidemia  Patient on rosuvastatin 10mg qhs.  - c/w atorvastatin 40mg qhs TI for rosuvastatin 10mg qhs.    #Severe protein calorie malnutrition  - Ensure supplemented diet    New medications: levaquin  Changes to old medications: none  Medications that were stopped: none  Items to follow up as outpatient: PCP appointment    Physical exam at the time of discharge:  General: NAD, thin appearing, not in acute distress  Head: NC/AT; MMM; PERRL; EOMI;  Neck: Supple; no JVD  Respiratory: CTAB; no wheezes/rales/rhonchi  Cardiovascular: Regular rhythm/rate; S1/S2+, no murmurs, rubs gallops   Gastrointestinal: Soft; NTND; bowel sounds normal and present  Extremities: WWP; no edema/cyanosis  Neurological: A&Ox3, CNII-XII grossly intact; no obvious focal deficits

## 2023-11-23 NOTE — DISCHARGE NOTE PROVIDER - NSDCFUSCHEDAPPT_GEN_ALL_CORE_FT
[facility]  INTMED [address]  Scheduled Appointment: 11/27/2023    [facility]  DIAGRAD  [address]  Scheduled Appointment: 12/07/2023    [facility]  OTOLARYNG [address]  Scheduled Appointment: 12/20/2023    Murray Murillo  [facility]  NEPHRO [address]  Scheduled Appointment: 01/08/2024    Franco Johnson  [facility]  HEARTVASC [address]  Scheduled Appointment: 01/26/2024

## 2023-11-23 NOTE — PROGRESS NOTE ADULT - SUBJECTIVE AND OBJECTIVE BOX
O/N Events: none    Subjective/ROS: Patient seen and examined at bedside. Pt resting comfortably and does not have any complaints at the moment. says he is breathing well.  Denies Fever/Chills, HA, CP, SOB, n/v, changes in bowel/urinary habits.  12pt ROS otherwise negative.    VITALS  Vital Signs Last 24 Hrs  T(C): 36.7 (23 Nov 2023 12:05), Max: 36.8 (23 Nov 2023 05:48)  T(F): 98 (23 Nov 2023 12:05), Max: 98.2 (23 Nov 2023 05:48)  HR: 90 (23 Nov 2023 12:05) (85 - 100)  BP: 142/74 (23 Nov 2023 12:05) (106/63 - 152/67)  BP(mean): 96 (23 Nov 2023 05:48) (96 - 102)  RR: 17 (23 Nov 2023 12:05) (17 - 18)  SpO2: 97% (23 Nov 2023 12:05) (94% - 100%)    Parameters below as of 23 Nov 2023 12:05  Patient On (Oxygen Delivery Method): nasal cannula  O2 Flow (L/min): 2      CAPILLARY BLOOD GLUCOSE      POCT Blood Glucose.: 139 mg/dL (23 Nov 2023 12:57)  POCT Blood Glucose.: 128 mg/dL (23 Nov 2023 08:49)  POCT Blood Glucose.: 118 mg/dL (22 Nov 2023 21:58)  POCT Blood Glucose.: 164 mg/dL (22 Nov 2023 17:41)      PHYSICAL EXAM  General: NAD, thin appearing, not in acute distress  Head: NC/AT; MMM; PERRL; EOMI;  Neck: Supple; no JVD  Respiratory: CTAB; no wheezes/rales/rhonchi  Cardiovascular: Regular rhythm/rate; S1/S2+, no murmurs, rubs gallops   Gastrointestinal: Soft; NTND; bowel sounds normal and present  Extremities: WWP; no edema/cyanosis  Neurological: A&Ox3, CNII-XII grossly intact; no obvious focal deficits    MEDICATIONS  (STANDING):  ascorbic acid 250 milliGRAM(s) Oral daily  aspirin enteric coated 81 milliGRAM(s) Oral daily  atorvastatin 40 milliGRAM(s) Oral at bedtime  dextrose 5%. 1000 milliLiter(s) (100 mL/Hr) IV Continuous <Continuous>  dextrose 5%. 1000 milliLiter(s) (50 mL/Hr) IV Continuous <Continuous>  dextrose 50% Injectable 25 Gram(s) IV Push once  dextrose 50% Injectable 12.5 Gram(s) IV Push once  dextrose 50% Injectable 25 Gram(s) IV Push once  enoxaparin Injectable 30 milliGRAM(s) SubCutaneous every 24 hours  gabapentin 100 milliGRAM(s) Oral every 8 hours  glucagon  Injectable 1 milliGRAM(s) IntraMuscular once  insulin lispro (ADMELOG) corrective regimen sliding scale   SubCutaneous Before meals and at bedtime  mirtazapine 15 milliGRAM(s) Oral at bedtime  multivitamin 1 Tablet(s) Oral daily  pantoprazole    Tablet 40 milliGRAM(s) Oral before breakfast  piperacillin/tazobactam IVPB.. 4.5 Gram(s) IV Intermittent every 8 hours  pirfenidone 801 milliGRAM(s) Oral three times a day  zinc sulfate 220 milliGRAM(s) Oral daily    MEDICATIONS  (PRN):  bisacodyl 5 milliGRAM(s) Oral at bedtime PRN Constipation  dextrose Oral Gel 15 Gram(s) Oral once PRN Blood Glucose LESS THAN 70 milliGRAM(s)/deciliter  senna 2 Tablet(s) Oral at bedtime PRN Constipation      No Known Allergies      LABS                        13.2   8.33  )-----------( 257      ( 23 Nov 2023 06:26 )             41.6     11-23    131<L>  |  93<L>  |  20  ----------------------------<  113<H>  4.4   |  28  |  0.53    Ca    9.6      23 Nov 2023 06:26  Phos  2.7     11-23  Mg     1.8     11-23        Urinalysis Basic - ( 23 Nov 2023 06:26 )    Color: x / Appearance: x / SG: x / pH: x  Gluc: 113 mg/dL / Ketone: x  / Bili: x / Urobili: x   Blood: x / Protein: x / Nitrite: x   Leuk Esterase: x / RBC: x / WBC x   Sq Epi: x / Non Sq Epi: x / Bacteria: x              IMAGING/EKG/ETC

## 2023-11-23 NOTE — PROGRESS NOTE ADULT - PROBLEM SELECTOR PLAN 6
The patient has a Hx of Diabetes Mellitus. On outpatient Jardiance 25mg. A1c on this admission is 7.7.     Plan:  - c/w mISS    #diabetic neuropathy   - gabapentin 100 mg TID
The patient has a Hx of Diabetes Mellitus. On outpatient Jardiance 25mg. A1c on this admission is 7.7  Plan:  - Monitor glucose   - c/w mISS
#Diabtes neuropathy  The patient has a Hx of Diabetes Mellitus. On outpatient Jardiance 25mg. A1c on this admission is 7.7  Plan:  - Monitor glucose   - c/w mISS  -gabapentin 100 mg TID
The patient has a Hx of Diabetes Mellitus. On outpatient Jardiance 25mg. A1c on this admission is 7.7.     Plan:  - c/w mISS    #diabetic neuropathy   - gabapentin 100 mg TID

## 2023-11-23 NOTE — PROGRESS NOTE ADULT - PROBLEM SELECTOR PLAN 9
F: none, caution given concern for CHF  E: replete as needed  N: Minced en moist   DVT ppx: lovenox   GI ppx: protonix 40mg qd   Dispo: outpatient PT  Code Status: DNR/DNI with a trial of NIV

## 2023-11-23 NOTE — DISCHARGE NOTE PROVIDER - NSDCCPTREATMENT_GEN_ALL_CORE_FT
PRINCIPAL PROCEDURE  Procedure: CT chest wo con  Findings and Treatment:   ACC: [id number] EXAM:  CT CHEST   ORDERED BY: XIOMY ARGUETA   PROCEDURE DATE:  07/18/2023    INTERPRETATION:  CLINICAL INFORMATION: 78-year-old male with provided   history post Covid fibrosis  COMPARISON: 3/22/2022, and 10/13/2021  FINDINGS:  LUNGS AND AIRWAYS: Patent central airways.  There has been marked   interval progression in basilar predominant pulmonary fibrosis   characterized by predominantly pulmonary interstitial thickening traction   bronchiectasis and bronchiolectasis and there now may be honeycomb lung   formation within the lung bases (5:197) within the left lower lobe and   (5:217) within the right lower lobe. There is new mild pneumomediastinum.   No evidence of pneumothorax.  PLEURA: No pleural effusion.  MEDIASTINUM AND MARY: Slight interval increase in the extent of moderate   mediastinal lymphadenopathy with persistent enlarged lower right   paratracheal, prevascular/AP window and subcarinal lymph nodes. The   largest lymph node in the AP window region measures 15 mm in short axis   and 13 mm in the subcarinal region  VESSELS: Severe coronary artery calcification/stents are again noted mild   aortic mural calcification. No evidence of aortic aneurysm. Main   pulmonary artery is upper limits of normal caliber.  HEART: Heart size is normal. No pericardial effusion. Moderate aortic   valve and mitral annulus calcification.  CHEST WALL AND LOWER NECK: Within normal limits.  VISUALIZED UPPER ABDOMEN: Increased colonic stool content compatible with   constipation. No evidence of pneumo peritoneum.  BONES: Age-appropriate degenerative changes.  IMPRESSION:  Marked interval progression in basilar predominant pulmonary fibrosis   with traction bronchiectasis and bronchiolectasis. There may be new areas   of honeycomb lung formation within the lung bases. Inview the provided   history the possibility of a progressive fibrotic form of organizing   pneumonia as the sequela of prior Covid 19 infection cannot be excluded.  Interval development of very mild pneumomediastinum which e      SECONDARY PROCEDURE  Procedure: Xray esophagus  Findings and Treatment:   ACC: [id number] EXAM:  XR ESOPH SNGL CON STUDY   ORDERED BY: LAZARA SAWYER   PROCEDURE DATE:  10/24/2023    INTERPRETATION:  XR FLUORO ESOPHAGRAM DATED 10/24/2023 9:45 AM  INDICATION: Male, 78 years-old with dysphagia to pills, solids and weight   loss.  PRIOR STUDIES: CT of the chest from 7/18/2023, chest x-ray from 6/16/2023   and CT of the chest abdomen and pelvis from 3/22/2022.  FINDINGS:   image: Fibrotic changes in the lungs bilaterally. No pleural   effusion. No pneumothorax. Degenerative disease of the spine.  Radiographic and fluoroscopic examination of the esophagus was performed   using barium contrast. At the beginning of the exam, patient aspirated   and had cough response. The patient was then able to complete the exam   without further aspiration. Contrast passed normally through the proximal   and midesophagus, but there is mild dysmotility in the distal esophagus.   Contrast then flows freely into the stomach. No hiatal hernia. A 13 mm   barium pill passes normally.  IMPRESSION:  1. Aspiration.  2. Mild dysmotility.  --- End of Report ---  MERON BORGES MD; Resident Radiologist  This document has been electronically signed.  CYRIL CALERO MD; Attending Radiologist  This document has been electronically signed. Oct 24 2023  4:45PM      Procedure: Xray esophagus  Findings and Treatment:

## 2023-11-23 NOTE — PROGRESS NOTE ADULT - PROBLEM SELECTOR PLAN 3
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 Carter outpatient, currently on pirfenidone. Per Pulm, does not suspect worsening of ILD.    Plan:   - Pirfenidone 801 mg tablets TID  - Protonix 40mg qd GI ppx while on pirfenidone   - pulm following, appreciate recs

## 2023-11-24 NOTE — SWALLOW VFSS/MBS ASSESSMENT ADULT - ADDITIONAL RECOMMENDATIONS
LT: Patient will safely tolerate the least restrictive diet without overt s/s of penetration/aspiration or changes in pulmonary status.  ST:   1) Patient will complete effortful swallow and danny exercise (2-3 sets of 10/daily) as able to improve efficiency of the swallow.   2) May consider Expiratory Muscle Strength Trainer (EMST) device to improve cough strength/airway protection- outpatient

## 2023-11-24 NOTE — SWALLOW VFSS/MBS ASSESSMENT ADULT - RECOMMENDED FEEDING/EATING TECHNIQUES
Robust throat clear with thin liquids/alternate food with liquid/crush medication (when feasible)/maintain upright posture during/after eating for 30 mins/oral hygiene/position upright (90 degrees)

## 2023-11-24 NOTE — PROGRESS NOTE ADULT - ATTENDING COMMENTS
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  #Pneumonia  -Continue Zosyn; Will consider switch to Levaquin   - WBC improved from 10.5->8.33  -Pulm following    #ILD  -Continue pirfenidone   -Continue  O2 NC pt is on 2L NC at home     #Diabetes  -Continue ISS; FS WNL     #HTN/HLD  #CAD s/p CABGx2 and PCI placement  -Continue ASA and statin     #DISPO  - Pt was seen by PT and recommended for home PT   - Will carlos d/c today or tomorrow pending continued clinical improvement as well as Pulmonary input
Seen and examined by me after sending him in yesterday. New CT shows dense RLL consolidation - same site as reported pneumonia recently treated at [facility]. Is modestly improved today in terms of comfort and O2 requirement. Cont Zosyn. Can take home pirfenidone if his wife brings it in. DNR.
abx till 11/27  pt will f/u with dr. Raul talley rehab outpt.
RML pna in setting of postCOVID related ILD.  Continue abx.  Start ambulation  daily out of bed to chair
HTN, DM2, CAD, PAD with post-COVID ILD now with acute hypoxemic respiratory failure and RLL pna  physical as above  continue zosyn  tolerating 3 LPM NC  stage 2 sacral decubitus; to use allevyn and frequent repositioning  add MVI, Zn and vitamin C to nutrition  continue pirfenidone  mobilize  follow sugars on SSI  BP acceptable  can step down to non telemetry floor
HTN, DM2, CAD, PAD with post-COVID ILD now with acute hypoxemic respiratory failure and RLL pna  physical as above  continue zosyn  tolerating 3 LPM NC  stage 2 sacral decubitus; to use allevyn and frequent repositioning  add MVI, Zn and vitamin C to nutrition  continue pirfenidone  mobilize  follow sugars on SSI  BP acceptable  decision making of high complexity
Pt. seen and examined by me earlier today; I have read Dr. Randhawa's note, I agree w/ her findings and plan of care as documented; cont. mgmt per Pulm recs

## 2023-11-24 NOTE — SWALLOW VFSS/MBS ASSESSMENT ADULT - ADDITIONAL INFORMATION
OPERATIVE REPORT      PREOPERATIVE DIAGNOSIS:   GERD       POSTOPERATIVE DIAGNOSIS:   Mild Antritis    Mild esophagitis        PROCEDURE PERFORMED:    EGD with biopsies        SURGEON:    Malik Baez DO        Assistant:    None        ANESTHESIA:   IV Sedation   Versed: 4 mg   Fentanyl: 150 mcg     Total time of moderate sedation: 14min     Estimated Blood Loss:    None        Specimens:   D1 bx  Antral bx  GE Jxn bx       Complications:   None apparent      Disposition:   Stable      Indications:   Epigastric pain. Patient has a history of GERD. No history of any endoscopy in the past. States his pain has improved since he started taking a PPI b.i.d.     FINDINGS:   Mild esophagitis mild antritis. No signs of active ulcers or active peptic ulcer disease.      NARRATIVE:   After informed consent was obtained the patient was brought to Endoscopy Suite where supplemental oxygen and cardiovascular monitoring were initiated. WHO protocol was initiated and completed.  The patient was given a viscous lidocaine swallow which was well tolerated. The patient was then placed in the left lateral decubitus position and after assured to be comfortable the above medications were administered in titrating dose fashion to achieve a desired of sedation. After appropriate level of sedation was achieved an oral bite block was placed. A well-lubricated Olympus gastroscope was advanced into the oropharynx and guided into the esophagus without difficulty.     The vocal cords were noted be grossly free of lesion. The gastroscope was advanced along the course esophagus. No pathology appreciated until reaching the distal esophagus were very minimal esophagitis was appreciated. The gastroscope was advanced in the gastric lumen and retroflexed. No pathology was appreciated. The gastroscope was straightened and the stomach was insufflated. Mild antritis was appreciated. The gastroscope was advanced through the pylorus and duodenal  sweep. No pathology was appreciated. Representative biopsies were obtained in the antrum. Hemostasis was assured to be complete. As much air as possible was evacuated. The gastroscope was slowly withdrawn reviewing all surface mucosal anatomy. No other pathology was appreciated. The gastroscope was removed. The bite block was removed. The patient tolerated the procedure well.        RECOMMENDATIONS:    - Continue PPI  bid  - Follow up with my in clinic as needed    - Will call with results of biopsy    - Avoid caffeine and alcohol    - Pathology pending        Malik Baez DO  4/10/2017 9:01 AM   Anatomical observations: N/A

## 2023-11-24 NOTE — CHART NOTE - NSCHARTNOTEFT_GEN_A_CORE
GOC conversation had in MICU between patient and primary team and MOLST filled out.  DNR/DNI, would want trial of non invasive ventilation, fluids, antibiotics.  Confirmed these wishes with patient at bedside today

## 2023-11-24 NOTE — PROGRESS NOTE ADULT - ASSESSMENT
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       Nutritional Assessment:  · Nutritional Assessment	This patient has been assessed with a concern for Malnutrition and has been determined to have a diagnosis/diagnoses of Severe protein-calorie malnutrition and Underweight (BMI < 19).    This patient is being managed with:   Diet Minced and Moist-  Lacto-Ovo Veg (Accepts Milk Prod. Eggs)    Start Time: 22:00  Supplement Feeding Modality:  Oral  Ensure Max Cans or Servings Per Day:  1       Frequency:  Three Times a day  Entered: Nov 22 2023 10:24AM    Diet Minced and Moist-  Lacto-Ovo Veg (Accepts Milk Prod. Eggs)  Supplement Feeding Modality:  Oral  Ensure Enlive Cans or Servings Per Day:  1       Frequency:  Two Times a day  Entered: Nov 21 2023  3:46PM    The following pending diet order is being considered for treatment of Severe protein-calorie malnutrition and Underweight (BMI < 19):null    Problem/Plan - 1:  ·  Problem: Pneumonia, aspiration.   ·  Plan: #Sepsis with acute hypoxic respiratory failure  Likely aspiration PNA vs HAP i/s/o recent PNA treatment and underlying Pulmonary Disease   On admission, met 2/4 SIRS criteria with 's, WBC 17 as well as tachypneic and with increasing O2 requirements. CTPE was done to rule out PE; no evidence of PE, however CT showed evidence of RML and RLL consolidation on a background of pulmonary fibrosis.   RVP negative. Urine legionella/step negative. MRSA negative. TTE wnl. blood culture neg X2    -zosyn 4.5g q8hrs (pseudomonal dosing) given recent antibiotic use and pulmonary fibrosis (11/20-11/26 for seven days course)  - f/u pulm recs to switch to oral abx  - c/w O2 via NC, wean as tolerated. Currently on 3L NC.    Problem/Plan - 2:  ·  Problem: Aspiration precautions.   ·  Plan: The patient had a barium swallow on 10/24/2023 showing Aspiration with Mild dysmotility. Current PNA could be 2/2 to aspiration as the CT consolidation was found in the right lobes.     Plan:  - minced and moist diet  - pending modified barium swallow study and will advance diet pending results of test.    Problem/Plan - 3:  ·  Problem: Pulmonary fibrosis.   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 Carter outpatient, currently on pirfenidone. Per Pulm, does not suspect worsening of ILD.    Plan:   - Pirfenidone 801 mg tablets TID  - Protonix 40mg qd GI ppx while on pirfenidone   - pulm following, appreciate recs.    Problem/Plan - 4:  ·  Problem: Adult failure to thrive.   ·  Plan: #sacral decubitus ulcer, stage 2  Patient lost > 30 lbs in 6 months in setting of chronic pulmonary fibrosis. Also with clean based stage 2 sacral decubitus ulcer on admission.  PT recommends outpatient PT    Plan:   - Supplement diet with ensure per nutrition  - dress ulcer and separate from bed as much as possible  - Encourage OOBTC.    Problem/Plan - 5:  ·  Problem: CAD (coronary artery disease).   ·  Plan: Patient with hx of CABG x2 (LIMA to LAD), as well as PCI to LCx and LAD. Patient on asa 81mg qd.     Plan:   - c/w asa 81mg qd.    Problem/Plan - 6:  ·  Problem: Diabetes mellitus.   ·  Plan: The patient has a Hx of Diabetes Mellitus. On outpatient Jardiance 25mg. A1c on this admission is 7.7.     Plan:  - c/w mISS    #diabetic neuropathy   - gabapentin 100 mg TID.    Problem/Plan - 7:  ·  Problem: Hypertension.   ·  Plan: Patient no longer on any meds for HTN. BP on admission 129/83-->150/75. Patient was on amlodipine, however states that he has not taken it due to development of edema in LE.     Plan:  - monitor BP.    Problem/Plan - 8:  ·  Problem: Hyperlipidemia.   ·  Plan: Patient on rosuvastatin 10mg qhs.    Plan:   - c/w atorvastatin 40mg qhs TI for rosuvastatin 10mg qhs.    Problem/Plan - 9:  ·  Problem: Prophylactic measure.   ·  Plan: F: none, caution given concern for CHF  E: replete as needed  N: Minced en moist   DVT ppx: lovenox   GI ppx: protonix 40mg qd   Dispo: outpatient PT  Code Status: DNR/DNI with a trial of NIV.

## 2023-11-24 NOTE — PROGRESS NOTE ADULT - REASON FOR ADMISSION
Pneumonia superimposed on Pulmonary Fibrosis

## 2023-11-24 NOTE — PROGRESS NOTE ADULT - PROVIDER SPECIALTY LIST ADULT
Internal Medicine
Internal Medicine
Pulmonology
Pulmonology
Internal Medicine
Pulmonology
Rehab Medicine
Internal Medicine

## 2023-11-24 NOTE — PROGRESS NOTE ADULT - SUBJECTIVE AND OBJECTIVE BOX
Physical Medicine and Rehabilitation Progress Note :       Patient is a 78y old  Male who presents with a chief complaint of Pneumonia superimposed on Pulmonary Fibrosis (23 Nov 2023 17:32)      HPI:  Patient is a 79 y/o M with pmhx of HTN, HLD, DM, CAD s/p CABG x2 and PCI LCx and LAD, PAD, hx of GIB, and 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 Patient has been following up with Dr. Carter since his diagnosis of pulmonary fibrosis. He was initially on ofev, but was recently started on pirfenidone 8/2023 for his pulmonary fibrosis. Patient was recently treated for a bacterial PNA at [facility] 9/11-9/15 and was discharged on cefpodoxime to complete a course of antibiotics. Patient was seen by Dr. Carter outpatient with complaints of dyspnea at rest, increasing O2 requirements (requiring O2 while at rest, not just when ambulating), increased agitation. Wife at bedside reported that patient's presentation was similar to his previous episode of PNA in September. Given patient's presentation, he was sent to the [facility] ED.   On arrival to the unit, the patient was accompanied by his wife. The patient states that his breathing is better compared to this AM. He has been coughing less and denies bringing up any phlegm at this time. No chest pain or abdominal pain. The patient is requesting coffee as he usually drinks some to go to sleep at night. Looks comfortable and not in distress at this time.     ED Course:   Vitals: Afebrile, Tachycardic (120), /89, Spo2 96% on 6L NC  Labs: WBC 17.4, Coags WNL, Na 133, Cl 90, Glucose 217, Ca 10.7, Pro-, A1c 7.7, ABG with respiratory acidosis,   Imaging: CT Chest: New consolidation is noted in the right middle lobe and in the right lower lobe on a background of thickened interstitial lung markings and pulmonary fibrosis and honeycombing, likely represent acute pneumonia superimposed on pulmonary fibrosis. Cannot exclude a degree of congestive heart failure.  Interventions: Vanc and Zosyn in ED  Consults: Pulmonology, ICU -> recommends Telemetry  (20 Nov 2023 20:26)                            13.2   8.33  )-----------( 257      ( 23 Nov 2023 06:26 )             41.6       11-24    134<L>  |  96  |  19  ----------------------------<  87  4.1   |  31  |  0.56    Ca    9.4      24 Nov 2023 05:30  Phos  2.7     11-23  Mg     1.8     11-23      Vital Signs Last 24 Hrs  T(C): 36.4 (24 Nov 2023 06:08), Max: 36.7 (23 Nov 2023 12:05)  T(F): 97.6 (24 Nov 2023 06:08), Max: 98 (23 Nov 2023 12:05)  HR: 93 (24 Nov 2023 06:08) (90 - 100)  BP: 148/80 (24 Nov 2023 06:08) (134/74 - 148/80)  BP(mean): 103 (24 Nov 2023 06:08) (103 - 103)  RR: 18 (24 Nov 2023 06:08) (17 - 18)  SpO2: 93% (24 Nov 2023 06:08) (93% - 97%)    Parameters below as of 24 Nov 2023 06:08  Patient On (Oxygen Delivery Method): nasal cannula  O2 Flow (L/min): 2      MEDICATIONS  (STANDING):  ascorbic acid 250 milliGRAM(s) Oral daily  aspirin enteric coated 81 milliGRAM(s) Oral daily  atorvastatin 40 milliGRAM(s) Oral at bedtime  dextrose 5%. 1000 milliLiter(s) (100 mL/Hr) IV Continuous <Continuous>  dextrose 5%. 1000 milliLiter(s) (50 mL/Hr) IV Continuous <Continuous>  dextrose 50% Injectable 25 Gram(s) IV Push once  dextrose 50% Injectable 12.5 Gram(s) IV Push once  dextrose 50% Injectable 25 Gram(s) IV Push once  enoxaparin Injectable 30 milliGRAM(s) SubCutaneous every 24 hours  gabapentin 100 milliGRAM(s) Oral every 8 hours  glucagon  Injectable 1 milliGRAM(s) IntraMuscular once  insulin lispro (ADMELOG) corrective regimen sliding scale   SubCutaneous Before meals and at bedtime  mirtazapine 15 milliGRAM(s) Oral at bedtime  multivitamin 1 Tablet(s) Oral daily  pantoprazole    Tablet 40 milliGRAM(s) Oral before breakfast  piperacillin/tazobactam IVPB.. 4.5 Gram(s) IV Intermittent every 8 hours  pirfenidone 801 milliGRAM(s) Oral three times a day  zinc sulfate 220 milliGRAM(s) Oral daily    MEDICATIONS  (PRN):  bisacodyl 5 milliGRAM(s) Oral at bedtime PRN Constipation  dextrose Oral Gel 15 Gram(s) Oral once PRN Blood Glucose LESS THAN 70 milliGRAM(s)/deciliter  senna 2 Tablet(s) Oral at bedtime PRN Constipation          Initial Functional Status Assessment :       Previous Level of Function:     · Ambulation Skills	independent; needs device  · Transfer Skills	independent; needs device  · ADL Skills	needed assist  · Additional Comments	Pt. resides in an elevator building, uses rollator for ambulation outdoors, RW vs no device indoors.    Cognitive Status Examination:   · Orientation	oriented to person, place, time and situation  · Level of Consciousness	alert  · Follows Commands and Answers Questions	100% of the time  · Personal Safety and Judgment	intact    Range of Motion Exam:   · Range of Motion Examination	bilateral lower extremity ROM was WFL (within functional limits); bilateral upper extremity ROM was WFL (within functional limits)    Manual Muscle Testing:   · Manual Muscle Testing Results	>3/5 t/o by functional assessment against gravity.    Bed Mobility: Rolling/Turning:     · Level of Bell	independent    Bed Mobility: Scooting/Bridging:     · Level of Bell	minimum assist (75% patients effort)    Bed Mobility: Sit to Supine:     · Level of Bell	minimum assist (75% patients effort)  · Physical Assist/Nonphysical Assist	1 person assist    Bed Mobility: Supine to Sit:     · Level of Bell	minimum assist (75% patients effort)  · Physical Assist/Nonphysical Assist	1 person assist    Transfer: Sit to Stand:     · Physical Assist/Nonphysical Assist	1 person assist; verbal cues  · Weight-Bearing Restrictions	weight-bearing as tolerated  · Assistive Device	rolling walker    Transfer: Stand to Sit:     · Level of Bell	contact guard  · Physical Assist/Nonphysical Assist	1 person assist; verbal cues  · Weight-Bearing Restrictions	weight-bearing as tolerated  · Assistive Device	rolling walker    Sit/Stand Transfer Safety Analysis:     · Transfer Safety Concerns Noted	decreased weight-shifting ability  · Impairments Contributing to Impaired Transfers	impaired balance; decreased strength    Gait Skills:     · Level of Bell	contact guard  · Physical Assist/Nonphysical Assist	1 person assist  · Weight-Bearing Restrictions	weight-bearing as tolerated  · Assistive Device	rolling walker  · Gait Distance	50 feet; x2    Gait Analysis:     · Gait Deviations Noted	decreased russell; increased time in double stance; decreased weight-shifting ability; decreased step length  · Impairments Contributing to Gait Deviations	impaired balance    Balance Skills Assessment:     · Sitting Balance: Static	good balance  · Sitting Balance: Dynamic	good balance  · Sit-to-Stand Balance	fair balance  · Standing Balance: Static	fair balance  · Standing Balance: Dynamic	fair balance    Sensory Examination:   Sensory Examination:    Grossly Intact:   · Gross Sensory Examination	Grossly Intact; to light touch t/o      Fine Motor Coordination:   Fine Motor Coordination Examination:    Fine Motor Coordination:   · Left Hand, Manipulation of Objects	normal performance  · Right Hand, Manipulation of Objects	normal performance    Treatment Location:   · Comments	Pt., p/w limited endurance, desaturation  to SpO2=86-88% with ambulation on 2L/min and improved ambulatory SpO2=90-92% on 3L/min.    Clinical Impressions:   · Functional Limitations in Following Categories (describe specific limitations)	self-care; home management  · Risk Reduction/Prevention (Describe Specific Areas of risk reduction/prevention)	risk factors  · Risk Areas	fall        PM&R Impression : as above    Current disposition plan recommendation :   d/c home with home physical therapy

## 2023-11-24 NOTE — DISCHARGE NOTE NURSING/CASE MANAGEMENT/SOCIAL WORK - PATIENT PORTAL LINK FT
You can access the FollowMyHealth Patient Portal offered by [facility] by registering at the following website: [email or web address]. By joining [employer]’s FollowMyHealth portal, you will also be able to view your health information using other applications (apps) compatible with our system.

## 2023-11-24 NOTE — SWALLOW VFSS/MBS ASSESSMENT ADULT - SLP PERTINENT HISTORY OF CURRENT PROBLEM
PMHx of HTN, HLD, DM, CAD s/p CABG x2 and PCI LCx and LAD, PAD, hx of GIB, and 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 Carter outpatient with concerns of AHRF 2/2 PNA vs. PE vs. worsening pulmonary fibrosis, found to have RML and RLL consolidation concerning for PNA, on a background of pulmonary fibrosis, admitted to [facility] on 11/20/23 for IV antibiotics.

## 2023-11-24 NOTE — PROGRESS NOTE ADULT - ASSESSMENT
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  CT chest 7/18/2023 :  1. Mild distal esophageal wall thickening, possibly inflammatory related to gastroesophageal reflux given small hiatal hernia, neoplasia cannot be completely excluded. Consider endoscopic correlation.  2. No suspicious adenopathy or evidence of metastatic disease.  3. Unchanged fibrotic interstitial lung disease with basilar predominance.  ?  PFT 5/24/22: FVC 1.35L (42%), FEV1 1.28L (57%), TLC 2.18L (37%), DLCO x / FENO 44 / 264m desat to 84%  PFT 5/24/22: FVC 1.12L (36%), FEV1 1.04L (47%), TLC 2.40L (41%), DLCO x / 312m desat to 83%, titrated to     MRSA (-)  Strep and legionella negative (-)    #Post covid fibrosis  #Pneumonia  #AHRF    Here with increased dyspnea from baseline and worsening cough with and increased oxygen requirements from baseline with new leukocytosis and lobar consolidation. Suspect AHRF in the setting due to bacterial pneumonia. Not on chronic steroids/immunosuppression so low suspicion for PCP pneumonia. Euvolemic on exam, -JVD, trace edema (improving), no effusions so low suspicion for fluid overload. No new ground glass on CT scan to suggest acute ILD flare. Symptoms improving, off oxygen,    - ensure patient ambulating and back to baseline prior to discharge  - continue with extended course of levquin antipseudomonal dosing to complete extended course given that patient is only recently started improving  - follow up with Dr. Carter outpatient clinic 78M with PMH of COVID-19 with post covid fibrosis received steroids up-front now on ofev and home oxygen sent in from clinic for tachycardia and hypoxemia with worsening dyspnea from his baseline    Data review:   LFTs wnl 11/2/2023  ?  CT chest 7/18/2023 :  1. Mild distal esophageal wall thickening, possibly inflammatory related to gastroesophageal reflux given small hiatal hernia, neoplasia cannot be completely excluded. Consider endoscopic correlation.  2. No suspicious adenopathy or evidence of metastatic disease.  3. Unchanged fibrotic interstitial lung disease with basilar predominance.  ?  PFT 5/24/22: FVC 1.35L (42%), FEV1 1.28L (57%), TLC 2.18L (37%), DLCO x / FENO 44 / 264m desat to 84%  PFT 5/24/22: FVC 1.12L (36%), FEV1 1.04L (47%), TLC 2.40L (41%), DLCO x / 312m desat to 83%, titrated to     MRSA (-)  Strep and legionella negative (-)    #Post covid fibrosis  #Pneumonia  #AHRF    Here with increased dyspnea from baseline and worsening cough with and increased oxygen requirements from baseline with new leukocytosis and lobar consolidation. Suspect AHRF in the setting due to bacterial pneumonia. Not on chronic steroids/immunosuppression so low suspicion for PCP pneumonia. Euvolemic on exam, -JVD, trace edema (improving), no effusions so low suspicion for fluid overload. No new ground glass on CT scan to suggest acute ILD flare. Symptoms improving, back to baseline oxygen requirements following 5-6 days on zosyn. Transitioned to levaquin.     - ensure patient ambulating and back to baseline prior to discharge  - continue with extended course of levquin antipseudomonal dosing to complete extended course given that patient is only recently started improving  - follow up with Dr. Carter outpatient clinic

## 2023-11-24 NOTE — SWALLOW VFSS/MBS ASSESSMENT ADULT - DIAGNOSTIC IMPRESSIONS
Mild oral phase dysphagia marked by prolonged mastication and mild reduced bolus cohesion. Moderate pharyngeal dysphagia with impact to safety>efficiency. Reduced hyolaryngeal movement with delayed and incomplete laryngeal vestibule closure resulted in a single instance of trace deep penetration to the VC and single instance of trace flash aspiration imaged during the swallow with thin liquids. Spontaneous throat clearing and subsequent swallow partially cleared aspirated material to PAS 3, fully cleared with a prompted robust throat clear. Reduced BOT retraction/bolus propulsion resulted in mild to moderate vallecular residual with solids>pudding, partially cleared with liquid wash. Trace pyriform sinus residual imaged (<deemed <10%) with liquids and solids. No penetration/aspiration imaged with pudding and solids.     Presentation likely most c/w deconditioning associated with multiple comorbidities associated patient's medical hx and recent multiple hospitalizations for acute infections, including PNA. Given swallow function, low suspicion aspiration due to oropharyngeal dysfunction contributed to patient's pulmonary status. Aspiration was infrequent and deemed trace. Nonetheless, recommend strategies listed below to maximize safety of the swallow. May consider Expiratory Muscle Strength Trainer (EMST) as an outpatient to improve airway protection/cough strength. Overall improvement dependent on overarching medical gains.

## 2023-11-24 NOTE — SWALLOW VFSS/MBS ASSESSMENT ADULT - COMMENTS
Clinical swallow evaluation completed on 11/22/23. No jennifer clinical indicators of penetration/aspiration or gross clinical indicators of pharyngeal inefficiency noted, though patient acknowledged intentionally taking "small sips" to prevent coughing episodes.     This service initially consulted due to aspiration imaged on esophagram.   Esophagram completed as an outpatient on 10/24/23:  image: Fibrotic changes in the lungs bilaterally. No pleural effusion. No pneumothorax. Degenerative disease of the spine. Radiographic and fluoroscopic examination of the esophagus was performed using barium contrast. At the beginning of the exam, patient aspirated and had cough response. The patient was then able to complete the exam without further aspiration. Contrast passed normally through the proximal and midesophagus, but there is mild dysmotility in the distal esophagus. Contrast then flows freely into the stomach. No hiatal hernia. A 13 mm barium pill passes normally.  IMPRESSION:1. Aspiration.  2. Mild dysmotility. Clinical swallow evaluation completed on 11/22/23. No jennifer clinical indicators of penetration/aspiration or gross clinical indicators of pharyngeal inefficiency noted, though patient acknowledged intentionally taking "small sips" to prevent coughing episodes.     This service initially consulted due to aspiration imaged on esophagram.   Esophagram completed as an outpatient on 10/24/23:  image: Fibrotic changes in the lungs bilaterally. No pleural effusion. No pneumothorax. Degenerative disease of the spine. Radiographic and fluoroscopic examination of the esophagus was performed using barium contrast. At the beginning of the exam, patient aspirated and had cough response. The patient was then able to complete the exam without further aspiration. Contrast passed normally through the proximal and midesophagus, but there is mild dysmotility in the distal esophagus. Contrast then flows freely into the stomach. No hiatal hernia. A 13 mm barium pill passes normally.  IMPRESSION:1. Aspiration.  2. Mild dysmotility.    Patient pending d/c to home today.

## 2023-11-24 NOTE — SWALLOW VFSS/MBS ASSESSMENT ADULT - ORAL PHASE COMMENTS
Adequate oral containment, prolonged mastication and bolus manipulation, minimal premature loss imaged to the hypopharynx with thin liquids, timely a/p transport, and functional oral clearance.

## 2023-11-24 NOTE — SWALLOW VFSS/MBS ASSESSMENT ADULT - PHARYNGEAL PHASE COMMENTS
Initiation of the pharyngeal swallow varied between the level of the laryngeal surface and pyriform sinuses with thin liquids. Reduced laryngeal elevation, partial anterior hyoid excursion, varying partial to complete epiglottic inversion with delayed and incomplete (narrow column) laryngeal vestibule closure resulted in a single instance of trace deep penetration to the level of the VC and a single instance of trace flash aspiration (anterior aspect of VC) imaged during the swallow with thin liquids. Both were with open cup sips (non-measured boluses, large bolus size and overall minimal loss to the pyriform sinuses prior to the swallow). Spontaneous throat clearing and subsequent swallow reduced trace aspirated material to penetration above the level of the VC (PAS 3). Prompted robust throat clear cleared the penetrated material. Of note, complete epiglottic inversion imaged with increased bolus size and viscosity. No velopharyngeal insufficiency imaged. Reduced BOT retraction (narrow column) and reduced pharyngeal stripping resulted in mild to moderate vallecular retention (10-49%) with solids>pudding (mild, closer to 10%) and trace pyriform sinus residual with thin liquids and solids (though deemed <10%). Liquid wash partially cleared pharyngeal residual.   No penetration/aspiration imaged with pudding and solids.

## 2023-11-24 NOTE — PROGRESS NOTE ADULT - SUBJECTIVE AND OBJECTIVE BOX
PULMONARY CONSULT SERVICE FOLLOW-UP NOTE    INTERVAL HPI:  Reviewed chart and overnight events; patient seen and examined at bedside.    MEDICATIONS:  Pulmonary:    Antimicrobials:  piperacillin/tazobactam IVPB.. 4.5 Gram(s) IV Intermittent every 8 hours    Anticoagulants:  aspirin enteric coated 81 milliGRAM(s) Oral daily  enoxaparin Injectable 30 milliGRAM(s) SubCutaneous every 24 hours    Cardiac:      Allergies    No Known Allergies    Intolerances        Vital Signs Last 24 Hrs  T(C): 36.4 (24 Nov 2023 12:16), Max: 36.4 (24 Nov 2023 06:08)  T(F): 97.6 (24 Nov 2023 12:16), Max: 97.6 (24 Nov 2023 06:08)  HR: 90 (24 Nov 2023 12:16) (90 - 93)  BP: 164/84 (24 Nov 2023 12:16) (148/80 - 164/84)  BP(mean): 103 (24 Nov 2023 06:08) (103 - 103)  RR: 18 (24 Nov 2023 12:16) (18 - 18)  SpO2: 97% (24 Nov 2023 12:16) (93% - 97%)    Parameters below as of 24 Nov 2023 12:16  Patient On (Oxygen Delivery Method): nasal cannula  O2 Flow (L/min): 2      11-23 @ 07:01  -  11-24 @ 07:00  --------------------------------------------------------  IN: 0 mL / OUT: 800 mL / NET: -800 mL          PHYSICAL EXAM:  Constitutional: NAD  HEENT: NC/AT; PERRL, anicteric sclera; MMM  Neck: supple  Cardiovascular: +S1/S2, RRR  Respiratory: CTA B/L; no W/R/R  Gastrointestinal: soft, NT/ND  Extremities: WWP; no edema, clubbing or cyanosis  Vascular: 2+ radial pulses B/L  Neurological: awake and alert; JOHNSON    LABS:      CBC Full  -  ( 23 Nov 2023 06:26 )  WBC Count : 8.33 K/uL  RBC Count : 4.54 M/uL  Hemoglobin : 13.2 g/dL  Hematocrit : 41.6 %  Platelet Count - Automated : 257 K/uL  Mean Cell Volume : 91.6 fl  Mean Cell Hemoglobin : 29.1 pg  Mean Cell Hemoglobin Concentration : 31.7 gm/dL  Auto Neutrophil # : x  Auto Lymphocyte # : x  Auto Monocyte # : x  Auto Eosinophil # : x  Auto Basophil # : x  Auto Neutrophil % : x  Auto Lymphocyte % : x  Auto Monocyte % : x  Auto Eosinophil % : x  Auto Basophil % : x    11-24    134<L>  |  96  |  19  ----------------------------<  87  4.1   |  31  |  0.56    Ca    9.4      24 Nov 2023 05:30  Phos  2.7     11-23  Mg     1.8     11-23            Urinalysis Basic - ( 24 Nov 2023 05:30 )    Color: x / Appearance: x / SG: x / pH: x  Gluc: 87 mg/dL / Ketone: x  / Bili: x / Urobili: x   Blood: x / Protein: x / Nitrite: x   Leuk Esterase: x / RBC: x / WBC x   Sq Epi: x / Non Sq Epi: x / Bacteria: x                RADIOLOGY & ADDITIONAL STUDIES:

## 2023-11-24 NOTE — SWALLOW VFSS/MBS ASSESSMENT ADULT - ROSENBEK'S PENETRATION ASPIRATION SCALE
trace, single instance with thin liquids/(6) contrast passes glottis, no subglottic residue remains (aspiration)

## 2023-11-28 PROBLEM — R63.4 WEIGHT LOSS, UNINTENTIONAL: Status: ACTIVE | Noted: 2022-03-09

## 2023-11-29 PROBLEM — E11.9 DIABETES MELLITUS: Status: ACTIVE | Noted: 2019-11-19

## 2023-11-29 PROBLEM — Z86.79 HISTORY OF HYPERTENSION: Status: RESOLVED | Noted: 2019-11-19 | Resolved: 2023-01-01

## 2023-11-29 PROBLEM — M25.521 RIGHT ELBOW PAIN: Status: RESOLVED | Noted: 2018-01-11 | Resolved: 2023-01-01

## 2023-11-29 PROBLEM — Z92.29 HISTORY OF INFLUENZA VACCINATION: Status: RESOLVED | Noted: 2020-10-01 | Resolved: 2023-01-01

## 2023-11-29 PROBLEM — E78.5 HLD (HYPERLIPIDEMIA): Status: ACTIVE | Noted: 2023-01-01

## 2023-11-29 PROBLEM — Z23 NEED FOR TDAP VACCINATION: Status: RESOLVED | Noted: 2023-01-01 | Resolved: 2023-01-01

## 2023-11-29 PROBLEM — I25.10 CAD (CORONARY ARTERY DISEASE): Status: ACTIVE | Noted: 2017-06-06

## 2023-11-29 PROBLEM — Z11.59 NEED FOR HEPATITIS C SCREENING TEST: Status: RESOLVED | Noted: 2023-01-01 | Resolved: 2023-01-01

## 2023-11-29 PROBLEM — J18.1 PNEUMONIA, LOBAR: Status: ACTIVE | Noted: 2023-01-01

## 2023-11-29 PROBLEM — I10 HTN (HYPERTENSION): Status: ACTIVE | Noted: 2017-06-06

## 2023-11-29 PROBLEM — J84.10 PULMONARY FIBROSIS: Status: ACTIVE | Noted: 2022-10-07

## 2023-11-29 PROBLEM — R93.89 ABNORMAL CHEST CT: Status: RESOLVED | Noted: 2022-07-15 | Resolved: 2023-01-01

## 2023-11-29 PROBLEM — Z92.29 HISTORY OF PNEUMOCOCCAL VACCINATION: Status: RESOLVED | Noted: 2020-08-06 | Resolved: 2023-01-01

## 2023-11-29 PROBLEM — Z11.59 NEED FOR HEPATITIS B SCREENING TEST: Status: RESOLVED | Noted: 2023-01-01 | Resolved: 2023-01-01

## 2023-12-20 ENCOUNTER — APPOINTMENT (OUTPATIENT)
Dept: OTOLARYNGOLOGY | Facility: CLINIC | Age: 78
End: 2023-12-20

## 2024-01-08 ENCOUNTER — APPOINTMENT (OUTPATIENT)
Dept: NEPHROLOGY | Facility: CLINIC | Age: 79
End: 2024-01-08

## 2024-01-26 ENCOUNTER — APPOINTMENT (OUTPATIENT)
Dept: HEART AND VASCULAR | Facility: CLINIC | Age: 79
End: 2024-01-26

## 2024-02-24 NOTE — SWALLOW VFSS/MBS ASSESSMENT ADULT - SLP GENERAL OBSERVATIONS
Patient was seen fully awake and alert, HOB fully elevated, on room air. Pt followed simple directives and communicated wants/needs. 36.6

## 2025-03-25 NOTE — PATIENT PROFILE ADULT - LEGAL HELP
Agree with resident A+P.  Patient seen and examined.  Discussed patient care with resident team.      David Meneses MD  ( 3/20/2025 )    [facility]   no

## (undated) DEVICE — FORCEP RADIAL JAW 4 W NDL 2.2MM 2.8MM 240CM ORANGE DISP